# Patient Record
Sex: MALE | Race: WHITE | NOT HISPANIC OR LATINO | Employment: OTHER | URBAN - METROPOLITAN AREA
[De-identification: names, ages, dates, MRNs, and addresses within clinical notes are randomized per-mention and may not be internally consistent; named-entity substitution may affect disease eponyms.]

---

## 2020-02-06 ENCOUNTER — HOSPITAL ENCOUNTER (EMERGENCY)
Facility: HOSPITAL | Age: 65
Discharge: ANOTHER HOSPITAL | End: 2020-02-06
Attending: EMERGENCY MEDICINE | Admitting: EMERGENCY MEDICINE

## 2020-02-06 ENCOUNTER — HOSPITAL ENCOUNTER (INPATIENT)
Facility: HOSPITAL | Age: 65
LOS: 2 days | Discharge: HOME | End: 2020-02-08
Attending: INTERNAL MEDICINE | Admitting: INTERNAL MEDICINE

## 2020-02-06 VITALS
RESPIRATION RATE: 16 BRPM | HEART RATE: 59 BPM | HEIGHT: 71 IN | WEIGHT: 180 LBS | OXYGEN SATURATION: 100 % | SYSTOLIC BLOOD PRESSURE: 153 MMHG | TEMPERATURE: 98 F | DIASTOLIC BLOOD PRESSURE: 103 MMHG | BODY MASS INDEX: 25.2 KG/M2

## 2020-02-06 DIAGNOSIS — I21.09 STEMI INVOLVING OTH CORONARY ARTERY OF ANTERIOR WALL (HCC): Primary | ICD-10-CM

## 2020-02-06 DIAGNOSIS — I21.3 ST ELEVATION MYOCARDIAL INFARCTION (STEMI), UNSPECIFIED ARTERY (HCC): Primary | ICD-10-CM

## 2020-02-06 LAB
ALBUMIN SERPL-MCNC: 4.4 G/DL (ref 3.5–5.2)
ALBUMIN/GLOB SERPL: 1.3 G/DL
ALP SERPL-CCNC: 91 U/L (ref 39–117)
ALT SERPL W P-5'-P-CCNC: 15 U/L (ref 1–41)
ANION GAP SERPL CALCULATED.3IONS-SCNC: 12.8 MMOL/L (ref 5–15)
AST SERPL-CCNC: 20 U/L (ref 1–40)
BILIRUB SERPL-MCNC: 0.3 MG/DL (ref 0.2–1.2)
BUN BLD-MCNC: 16 MG/DL (ref 8–23)
BUN/CREAT SERPL: 14 (ref 7–25)
CALCIUM SPEC-SCNC: 9.8 MG/DL (ref 8.6–10.5)
CHLORIDE SERPL-SCNC: 104 MMOL/L (ref 98–107)
CO2 SERPL-SCNC: 24.2 MMOL/L (ref 22–29)
CREAT BLD-MCNC: 1.14 MG/DL (ref 0.76–1.27)
GFR SERPL CREATININE-BSD FRML MDRD: 65 ML/MIN/1.73
GLOBULIN UR ELPH-MCNC: 3.4 GM/DL
GLUCOSE BLD-MCNC: 120 MG/DL (ref 65–99)
GLUCOSE BLDC GLUCOMTR-MCNC: 102 MG/DL (ref 70–130)
GLUCOSE BLDC GLUCOMTR-MCNC: 85 MG/DL (ref 70–130)
HOLD SPECIMEN: NORMAL
POTASSIUM BLD-SCNC: 4.3 MMOL/L (ref 3.5–5.2)
PROT SERPL-MCNC: 7.8 G/DL (ref 6–8.5)
SODIUM BLD-SCNC: 141 MMOL/L (ref 136–145)
TROPONIN T SERPL-MCNC: 0.02 NG/ML (ref 0–0.03)
TROPONIN T SERPL-MCNC: 0.13 NG/ML (ref 0–0.03)

## 2020-02-06 PROCEDURE — 93458 L HRT ARTERY/VENTRICLE ANGIO: CPT | Performed by: INTERNAL MEDICINE

## 2020-02-06 PROCEDURE — C1887 CATHETER, GUIDING: HCPCS | Performed by: INTERNAL MEDICINE

## 2020-02-06 PROCEDURE — 80299 QUANTITATIVE ASSAY DRUG: CPT | Performed by: INTERNAL MEDICINE

## 2020-02-06 PROCEDURE — 92978 ENDOLUMINL IVUS OCT C 1ST: CPT | Performed by: INTERNAL MEDICINE

## 2020-02-06 PROCEDURE — 96375 TX/PRO/DX INJ NEW DRUG ADDON: CPT

## 2020-02-06 PROCEDURE — 99285 EMERGENCY DEPT VISIT HI MDM: CPT | Performed by: EMERGENCY MEDICINE

## 2020-02-06 PROCEDURE — B2151ZZ FLUOROSCOPY OF LEFT HEART USING LOW OSMOLAR CONTRAST: ICD-10-PCS | Performed by: INTERNAL MEDICINE

## 2020-02-06 PROCEDURE — G0480 DRUG TEST DEF 1-7 CLASSES: HCPCS | Performed by: INTERNAL MEDICINE

## 2020-02-06 PROCEDURE — 85347 COAGULATION TIME ACTIVATED: CPT

## 2020-02-06 PROCEDURE — 25010000002 FENTANYL CITRATE (PF) 100 MCG/2ML SOLUTION: Performed by: INTERNAL MEDICINE

## 2020-02-06 PROCEDURE — 82962 GLUCOSE BLOOD TEST: CPT

## 2020-02-06 PROCEDURE — 25010000002 PROTAMINE SULFATE PER 10 MG: Performed by: INTERNAL MEDICINE

## 2020-02-06 PROCEDURE — 84484 ASSAY OF TROPONIN QUANT: CPT | Performed by: INTERNAL MEDICINE

## 2020-02-06 PROCEDURE — 80184 ASSAY OF PHENOBARBITAL: CPT | Performed by: INTERNAL MEDICINE

## 2020-02-06 PROCEDURE — 99284 EMERGENCY DEPT VISIT MOD MDM: CPT

## 2020-02-06 PROCEDURE — 25010000002 HEPARIN (PORCINE) PER 1000 UNITS: Performed by: INTERNAL MEDICINE

## 2020-02-06 PROCEDURE — 25010000002 MIDAZOLAM PER 1 MG: Performed by: INTERNAL MEDICINE

## 2020-02-06 PROCEDURE — 99223 1ST HOSP IP/OBS HIGH 75: CPT | Performed by: INTERNAL MEDICINE

## 2020-02-06 PROCEDURE — 93010 ELECTROCARDIOGRAM REPORT: CPT | Performed by: INTERNAL MEDICINE

## 2020-02-06 PROCEDURE — 93005 ELECTROCARDIOGRAM TRACING: CPT | Performed by: INTERNAL MEDICINE

## 2020-02-06 PROCEDURE — 96365 THER/PROPH/DIAG IV INF INIT: CPT

## 2020-02-06 PROCEDURE — B2111ZZ FLUOROSCOPY OF MULTIPLE CORONARY ARTERIES USING LOW OSMOLAR CONTRAST: ICD-10-PCS | Performed by: INTERNAL MEDICINE

## 2020-02-06 PROCEDURE — C1753 CATH, INTRAVAS ULTRASOUND: HCPCS | Performed by: INTERNAL MEDICINE

## 2020-02-06 PROCEDURE — C1769 GUIDE WIRE: HCPCS | Performed by: INTERNAL MEDICINE

## 2020-02-06 PROCEDURE — C1894 INTRO/SHEATH, NON-LASER: HCPCS | Performed by: INTERNAL MEDICINE

## 2020-02-06 PROCEDURE — 4A023N7 MEASUREMENT OF CARDIAC SAMPLING AND PRESSURE, LEFT HEART, PERCUTANEOUS APPROACH: ICD-10-PCS | Performed by: INTERNAL MEDICINE

## 2020-02-06 PROCEDURE — 0 IOPAMIDOL PER 1 ML: Performed by: INTERNAL MEDICINE

## 2020-02-06 PROCEDURE — 93005 ELECTROCARDIOGRAM TRACING: CPT | Performed by: EMERGENCY MEDICINE

## 2020-02-06 PROCEDURE — 84484 ASSAY OF TROPONIN QUANT: CPT | Performed by: EMERGENCY MEDICINE

## 2020-02-06 PROCEDURE — 80053 COMPREHEN METABOLIC PANEL: CPT | Performed by: EMERGENCY MEDICINE

## 2020-02-06 RX ORDER — SODIUM CHLORIDE 9 MG/ML
INJECTION, SOLUTION INTRAVENOUS CONTINUOUS PRN
Status: COMPLETED | OUTPATIENT
Start: 2020-02-06 | End: 2020-02-06

## 2020-02-06 RX ORDER — SODIUM CHLORIDE 9 MG/ML
125 INJECTION, SOLUTION INTRAVENOUS CONTINUOUS
Status: ACTIVE | OUTPATIENT
Start: 2020-02-06 | End: 2020-02-06

## 2020-02-06 RX ORDER — NITROGLYCERIN 20 MG/100ML
5-200 INJECTION INTRAVENOUS
Status: DISCONTINUED | OUTPATIENT
Start: 2020-02-06 | End: 2020-02-06 | Stop reason: HOSPADM

## 2020-02-06 RX ORDER — NICOTINE 21 MG/24HR
1 PATCH, TRANSDERMAL 24 HOURS TRANSDERMAL
Status: DISCONTINUED | OUTPATIENT
Start: 2020-02-06 | End: 2020-02-08 | Stop reason: HOSPADM

## 2020-02-06 RX ORDER — MIDAZOLAM HYDROCHLORIDE 1 MG/ML
INJECTION INTRAMUSCULAR; INTRAVENOUS AS NEEDED
Status: DISCONTINUED | OUTPATIENT
Start: 2020-02-06 | End: 2020-02-06 | Stop reason: HOSPADM

## 2020-02-06 RX ORDER — ISOSORBIDE MONONITRATE 30 MG/1
30 TABLET, EXTENDED RELEASE ORAL
Status: DISCONTINUED | OUTPATIENT
Start: 2020-02-06 | End: 2020-02-08 | Stop reason: HOSPADM

## 2020-02-06 RX ORDER — HEPARIN SODIUM 10000 [USP'U]/100ML
INJECTION, SOLUTION INTRAVENOUS
Status: DISCONTINUED
Start: 2020-02-06 | End: 2020-02-06 | Stop reason: HOSPADM

## 2020-02-06 RX ORDER — CLOPIDOGREL BISULFATE 75 MG/1
TABLET ORAL
Status: COMPLETED
Start: 2020-02-06 | End: 2020-02-06

## 2020-02-06 RX ORDER — CLOPIDOGREL BISULFATE 75 MG/1
600 TABLET ORAL ONCE
Status: COMPLETED | OUTPATIENT
Start: 2020-02-06 | End: 2020-02-06

## 2020-02-06 RX ORDER — SODIUM CHLORIDE 0.9 % (FLUSH) 0.9 %
10 SYRINGE (ML) INJECTION AS NEEDED
Status: DISCONTINUED | OUTPATIENT
Start: 2020-02-06 | End: 2020-02-06 | Stop reason: HOSPADM

## 2020-02-06 RX ORDER — ASPIRIN 325 MG
325 TABLET ORAL EVERY 6 HOURS PRN
COMMUNITY
End: 2020-02-08 | Stop reason: HOSPADM

## 2020-02-06 RX ORDER — NITROGLYCERIN 20 MG/100ML
INJECTION INTRAVENOUS
Status: COMPLETED
Start: 2020-02-06 | End: 2020-02-06

## 2020-02-06 RX ORDER — NITROGLYCERIN 0.4 MG/1
0.4 TABLET SUBLINGUAL
Status: DISCONTINUED | OUTPATIENT
Start: 2020-02-06 | End: 2020-02-06 | Stop reason: HOSPADM

## 2020-02-06 RX ORDER — LIDOCAINE HYDROCHLORIDE 20 MG/ML
INJECTION, SOLUTION INFILTRATION; PERINEURAL AS NEEDED
Status: DISCONTINUED | OUTPATIENT
Start: 2020-02-06 | End: 2020-02-06 | Stop reason: HOSPADM

## 2020-02-06 RX ORDER — METOPROLOL TARTRATE 5 MG/5ML
INJECTION INTRAVENOUS
Status: DISCONTINUED
Start: 2020-02-06 | End: 2020-02-06 | Stop reason: HOSPADM

## 2020-02-06 RX ORDER — HEPARIN SODIUM 1000 [USP'U]/ML
INJECTION, SOLUTION INTRAVENOUS; SUBCUTANEOUS AS NEEDED
Status: DISCONTINUED | OUTPATIENT
Start: 2020-02-06 | End: 2020-02-06 | Stop reason: HOSPADM

## 2020-02-06 RX ORDER — ASPIRIN 325 MG
325 TABLET ORAL ONCE
Status: COMPLETED | OUTPATIENT
Start: 2020-02-06 | End: 2020-02-06

## 2020-02-06 RX ORDER — METOPROLOL TARTRATE 50 MG/1
TABLET, FILM COATED ORAL
Status: DISCONTINUED
Start: 2020-02-06 | End: 2020-02-06 | Stop reason: WASHOUT

## 2020-02-06 RX ORDER — PROTAMINE SULFATE 10 MG/ML
INJECTION, SOLUTION INTRAVENOUS AS NEEDED
Status: DISCONTINUED | OUTPATIENT
Start: 2020-02-06 | End: 2020-02-06 | Stop reason: HOSPADM

## 2020-02-06 RX ORDER — NAPROXEN SODIUM 220 MG
220 TABLET ORAL EVERY 12 HOURS PRN
COMMUNITY
End: 2020-02-08 | Stop reason: HOSPADM

## 2020-02-06 RX ORDER — FENTANYL CITRATE 50 UG/ML
INJECTION, SOLUTION INTRAMUSCULAR; INTRAVENOUS AS NEEDED
Status: DISCONTINUED | OUTPATIENT
Start: 2020-02-06 | End: 2020-02-06 | Stop reason: HOSPADM

## 2020-02-06 RX ORDER — HEPARIN SODIUM 5000 [USP'U]/ML
INJECTION, SOLUTION INTRAVENOUS; SUBCUTANEOUS
Status: DISCONTINUED
Start: 2020-02-06 | End: 2020-02-06 | Stop reason: WASHOUT

## 2020-02-06 RX ORDER — ASPIRIN 81 MG/1
TABLET, CHEWABLE ORAL
Status: DISCONTINUED
Start: 2020-02-06 | End: 2020-02-06 | Stop reason: HOSPADM

## 2020-02-06 RX ADMIN — CLOPIDOGREL BISULFATE 600 MG: 75 TABLET ORAL at 11:44

## 2020-02-06 RX ADMIN — SODIUM CHLORIDE 125 ML/HR: 9 INJECTION, SOLUTION INTRAVENOUS at 17:04

## 2020-02-06 RX ADMIN — NITROGLYCERIN: 20 INJECTION INTRAVENOUS at 11:44

## 2020-02-06 RX ADMIN — DILTIAZEM HYDROCHLORIDE 30 MG: 30 TABLET, FILM COATED ORAL at 21:13

## 2020-02-06 RX ADMIN — CLOPIDOGREL 600 MG: 75 TABLET, FILM COATED ORAL at 11:44

## 2020-02-06 RX ADMIN — METOPROLOL TARTRATE 5 MG: 1 INJECTION, SOLUTION INTRAVENOUS at 11:46

## 2020-02-06 RX ADMIN — ISOSORBIDE MONONITRATE 30 MG: 30 TABLET ORAL at 15:26

## 2020-02-06 RX ADMIN — DILTIAZEM HYDROCHLORIDE 30 MG: 30 TABLET, FILM COATED ORAL at 15:26

## 2020-02-06 RX ADMIN — NICOTINE 1 PATCH: 21 PATCH, EXTENDED RELEASE TRANSDERMAL at 17:18

## 2020-02-06 RX ADMIN — ASPIRIN 325 MG: 325 TABLET, COATED ORAL at 11:41

## 2020-02-06 NOTE — ED NOTES
Called EMS to transport patient to [facility].     Meri Springer  02/06/20 1142

## 2020-02-06 NOTE — ED PROVIDER NOTES
Subjective   64-year-old male who initially denied all past medical history presents complaining of bilateral arm pain and chest tightness.  Patient has had intermittent episodes of this since yesterday but this morning symptoms became worse and have persisted so patient presented here.  He denies any shortness of breath.  He does endorse some nausea with episodes of pain.  No identified aggravating or relieving factors.  On further questioning, patient reports that he did have cardiac work-up 4 to 5 years ago and was told that he had blockages, but did not follow-up.  Patient states he has not been on any medications for several years.  Patient is current everyday smoker smoking 2 packs/day.          Review of Systems   Constitutional: Negative.    HENT: Negative.    Eyes: Negative.    Respiratory: Negative.    Cardiovascular:        Per HPI, otherwise negative   Gastrointestinal:        Per HPI, otherwise negative   Endocrine: Negative.    Genitourinary: Negative.    Musculoskeletal:        Bilateral arm pain   Skin: Negative.    Allergic/Immunologic: Negative.    Neurological: Negative.    Hematological: Negative.    Psychiatric/Behavioral: Negative.        History reviewed. No pertinent past medical history.    No Known Allergies    History reviewed. No pertinent surgical history.    History reviewed. No pertinent family history.    Social History     Socioeconomic History   • Marital status: Significant Other     Spouse name: Not on file   • Number of children: Not on file   • Years of education: Not on file   • Highest education level: Not on file   Tobacco Use   • Smoking status: Current Every Day Smoker     Packs/day: 2.00     Types: Cigarettes   Substance and Sexual Activity   • Alcohol use: Yes   • Drug use: Not Currently           Objective   Physical Exam   Constitutional: He is oriented to person, place, and time. He appears well-developed and well-nourished. No distress.   HENT:   Head: Normocephalic  and atraumatic.   Mouth/Throat: Oropharynx is clear and moist.   Eyes: Pupils are equal, round, and reactive to light. Conjunctivae and EOM are normal.   Neck: Normal range of motion. Neck supple.   Cardiovascular: Normal rate, regular rhythm, normal heart sounds and intact distal pulses.   Pulmonary/Chest: Effort normal and breath sounds normal. No respiratory distress.   Abdominal: Soft. He exhibits no distension. There is no tenderness.   Musculoskeletal: Normal range of motion. He exhibits no edema, tenderness or deformity.   Neurological: He is alert and oriented to person, place, and time. No cranial nerve deficit.   Skin: Skin is warm and dry. He is not diaphoretic.   Psychiatric: He has a normal mood and affect. His behavior is normal. Judgment and thought content normal.       ECG 12 Lead    Date/Time: 2/6/2020 11:29 AM  Performed by: Artie Kelley MD  Authorized by: Artie Kelley MD   Interpreted by physician  Previous ECG: no previous ECG available  Rhythm: sinus rhythm  Rate: normal  BPM: 66  Conduction: conduction normal  ST Elevation: V1, V2 and aVL  ST Depression: II, III and aVF  T flattening: all  Clinical impression: myocardial infarction  Comments: STEMI                 ED Course  ED Course as of Feb 06 1210   Thu Feb 06, 2020   1133 Triage EKG and initial history concerning for STEMI.  Will initiate STEMI activation.    [TD]   1158 Patient given 325 aspirin, started on nitro drip, given 600 mg Plavix, 5 mg Lopressor.  After intervention, patient's pain decreased from an 8 to a 1.  Patient has been accepted by Dr. Chao to proceed to Cath Lab at [facility].    [TD]      ED Course User Index  [TD] Artie Kelley MD                                               [facility]    Final diagnoses:   ST elevation myocardial infarction (STEMI), unspecified artery (CMS/HCC)            Artie Kelley MD  02/06/20 1211

## 2020-02-06 NOTE — H&P
Date of Hospital Visit: 20  Encounter Provider: Darwin Chao MD  Place of Service: [facility] CARDIOLOGY  Patient Name: Sammy Roblero  :1955  [id number]    Chief complaint: Chest discomfort    History of Present Illness: 64-year-old gentleman looks about 15 years older than his stated age.  Longstanding tobacco abuse does not go to the physician otherwise.  Came in with stuttering chest pain off and on since yesterday.  In [facility] EKG shows anterior ST elevation in V1 and 2 in 1 and aVL and sent here to the Cath Lab at [facility].  He is having a little bit of discomfort right now.  No history of PND orthopnea edema no bleeding no history of stroke.  He says he has had a history of blockage may be in his left carotid it sounds like he denies significant history of lung or kidney problems    No past medical history on file.    No past surgical history on file.    No medications prior to admission.       Current Meds  Current Facility-Administered Medications on File Prior to Encounter   Medication Dose Route Frequency Provider Last Rate Last Dose   • [COMPLETED] aspirin tablet 325 mg  325 mg Oral Once Artie Kelley MD   325 mg at 20 1141   • [COMPLETED] clopidogrel (PLAVIX) tablet 600 mg  600 mg Oral Once Artie Kelley MD   600 mg at 20 1144   • [COMPLETED] metoprolol tartrate (LOPRESSOR) injection 5 mg  5 mg Intravenous Once Artie Kelley MD   5 mg at 20 1146   • [DISCONTINUED] aspirin 81 MG chewable tablet  - ADS Override Pull            • [DISCONTINUED] Heparin (Porcine) 34337-3.45 UT/250ML-% infusion  - ADS Override Pull            • [DISCONTINUED] heparin (porcine) 5000 UNIT/ML injection  - ADS Override Pull            • [DISCONTINUED] metoprolol tartrate (LOPRESSOR) 50 MG tablet  - ADS Override Pull            • [DISCONTINUED] morphine 4 MG/ML injection  - ADS Override Pull            • [DISCONTINUED] nitroglycerin  (NITROSTAT) SL tablet 0.4 mg  0.4 mg Sublingual Q5 Min PRN Artie Kelley MD       • [DISCONTINUED] nitroglycerin 50 mg/250 mL (0.2 mg/mL) infusion  5-200 mcg/min Intravenous Titrated Artie Kelley MD   Stopped at 02/06/20 1202   • [DISCONTINUED] sodium chloride 0.9 % flush 10 mL  10 mL Intravenous PRN Artie Kelley MD         No current outpatient medications on file prior to encounter.       Social History     Socioeconomic History   • Marital status: Significant Other     Spouse name: Not on file   • Number of children: Not on file   • Years of education: Not on file   • Highest education level: Not on file   Tobacco Use   • Smoking status: Current Every Day Smoker     Packs/day: 2.00     Types: Cigarettes   Substance and Sexual Activity   • Alcohol use: Yes   • Drug use: Not Currently       Family Hx: Non-contributory    REVIEW OF SYSTEMS:   ROS was performed and is negative except as outlined in HPI     REVIEW OF SYSTEMS:   CONSTITUTIONAL: No weight loss, fever, chills, weakness or fatigue.   HEENT: Eyes: No visual loss, blurred vision, double vision or yellow sclerae. Ears, Nose, Throat: No hearing loss, sneezing, congestion, runny nose or sore throat.   SKIN: No rash or itching.     RESPIRATORY: No shortness of breath, hemoptysis, cough or sputum.   GASTROINTESTINAL: No anorexia, nausea, vomiting or diarrhea. No abdominal pain, bright red blood per rectum or melena.  NEUROLOGICAL: No headache, dizziness, syncope, paralysis, numbness or tingling in the extremities.  MUSCULOSKELETAL: No muscle, back pain, joint pain or stiffness.   HEMATOLOGIC: No anemia, bleeding or bruising.   LYMPHATICS: No enlarged nodes.  PSYCHIATRIC: No history of depression, anxiety, hallucinations.   ENDOCRINOLOGIC: No reports of sweating, cold or heat intolerance. No polyuria or polydipsia.        Objective:     Vitals:    02/06/20 1305 02/06/20 1309 02/06/20 1314 02/06/20 1319   Resp: 16 18 20 20     There is no height  or weight on file to calculate BMI.      General Appearance:    Alert, oriented x 3, in no acute distress, older than his stated age   Head:    Normocephalic, without obvious abnormality, atraumatic   Ears:    Ears appear intact with no abnormalities noted   Throat:   No oral lesions, dentition good   Neck:   No adenopathy, supple, trachea midline, no thyromegaly, no carotid bruit, no JVD   Lungs:    Breath sounds are equal and  clear to auscultation    Heart:   Normal S1 and S2, RRR, no murmur/gallop or rub   Abdomen:    Normal bowel sounds, obese, soft non-tender, non-distended, no organomegaly, no guarding   Extremities:   Moves all extremities well, no edema, no cyanosis, no redness   Pulses:   Pulses palpable and equal bilaterally. Normal radial pulses   Skin:   No bleeding, bruising or rash   Lymph nodes:   No palpable adenopathy     I personally viewed and interpreted the patient's EKG/Telemetry data    Assessment:  Active Hospital Problems    Diagnosis  POA   • STEMI involving oth coronary artery of anterior wall (CMS/HCC) [I21.09]  Yes      Resolved Hospital Problems   No resolved problems to display.       Plan: It looks like he is having an anterior STEMI working to take him directly to the Cath Lab further decisions will be based on the findings at the time of cath.  This is obviously a life-threatening situation

## 2020-02-06 NOTE — ED NOTES
Called [facility]. Call is paged out to interventionist.      Meri Springer  02/06/20 1137

## 2020-02-06 NOTE — PLAN OF CARE
Pt admitted from cath lab. No complaints of pain. Tolerating diet. TR band removed. Site soft, no hematoma. CTM.

## 2020-02-07 ENCOUNTER — APPOINTMENT (OUTPATIENT)
Dept: CARDIOLOGY | Facility: HOSPITAL | Age: 65
End: 2020-02-07

## 2020-02-07 LAB
ACT BLD: 114 SECONDS (ref 82–152)
ACT BLD: 246 SECONDS (ref 82–152)
ANION GAP SERPL CALCULATED.3IONS-SCNC: 12.1 MMOL/L (ref 5–15)
BH CV ECHO MEAS - ACS: 1.8 CM
BH CV ECHO MEAS - AO MAX PG (FULL): 0.58 MMHG
BH CV ECHO MEAS - AO MAX PG: 8.6 MMHG
BH CV ECHO MEAS - AO MEAN PG (FULL): 0 MMHG
BH CV ECHO MEAS - AO MEAN PG: 4 MMHG
BH CV ECHO MEAS - AO ROOT AREA (BSA CORRECTED): 1.7
BH CV ECHO MEAS - AO ROOT AREA: 9.1 CM^2
BH CV ECHO MEAS - AO ROOT DIAM: 3.4 CM
BH CV ECHO MEAS - AO V2 MAX: 147 CM/SEC
BH CV ECHO MEAS - AO V2 MEAN: 96.4 CM/SEC
BH CV ECHO MEAS - AO V2 VTI: 25.9 CM
BH CV ECHO MEAS - ASC AORTA: 3.1 CM
BH CV ECHO MEAS - AVA(I,A): 3.6 CM^2
BH CV ECHO MEAS - AVA(I,D): 3.6 CM^2
BH CV ECHO MEAS - AVA(V,A): 3 CM^2
BH CV ECHO MEAS - AVA(V,D): 3 CM^2
BH CV ECHO MEAS - BSA(HAYCOCK): 2 M^2
BH CV ECHO MEAS - BSA: 2 M^2
BH CV ECHO MEAS - BZI_BMI: 23.8 KILOGRAMS/M^2
BH CV ECHO MEAS - BZI_METRIC_HEIGHT: 180.3 CM
BH CV ECHO MEAS - BZI_METRIC_WEIGHT: 77.6 KG
BH CV ECHO MEAS - EDV(CUBED): 74.1 ML
BH CV ECHO MEAS - EDV(MOD-SP2): 87 ML
BH CV ECHO MEAS - EDV(MOD-SP4): 87 ML
BH CV ECHO MEAS - EDV(TEICH): 78.6 ML
BH CV ECHO MEAS - EF(CUBED): 73.4 %
BH CV ECHO MEAS - EF(MOD-BP): 75 %
BH CV ECHO MEAS - EF(MOD-SP2): 75.9 %
BH CV ECHO MEAS - EF(MOD-SP4): 72.4 %
BH CV ECHO MEAS - EF(TEICH): 65.6 %
BH CV ECHO MEAS - ESV(CUBED): 19.7 ML
BH CV ECHO MEAS - ESV(MOD-SP2): 21 ML
BH CV ECHO MEAS - ESV(MOD-SP4): 24 ML
BH CV ECHO MEAS - ESV(TEICH): 27 ML
BH CV ECHO MEAS - FS: 35.7 %
BH CV ECHO MEAS - IVS/LVPW: 1.1
BH CV ECHO MEAS - IVSD: 1.2 CM
BH CV ECHO MEAS - LAT PEAK E' VEL: 10.6 CM/SEC
BH CV ECHO MEAS - LV DIASTOLIC VOL/BSA (35-75): 44.1 ML/M^2
BH CV ECHO MEAS - LV MASS(C)D: 167.4 GRAMS
BH CV ECHO MEAS - LV MASS(C)DI: 84.9 GRAMS/M^2
BH CV ECHO MEAS - LV MAX PG: 8.1 MMHG
BH CV ECHO MEAS - LV MEAN PG: 4 MMHG
BH CV ECHO MEAS - LV SYSTOLIC VOL/BSA (12-30): 12.2 ML/M^2
BH CV ECHO MEAS - LV V1 MAX: 142 CM/SEC
BH CV ECHO MEAS - LV V1 MEAN: 88.4 CM/SEC
BH CV ECHO MEAS - LV V1 VTI: 29.8 CM
BH CV ECHO MEAS - LVIDD: 4.2 CM
BH CV ECHO MEAS - LVIDS: 2.7 CM
BH CV ECHO MEAS - LVLD AP2: 8 CM
BH CV ECHO MEAS - LVLD AP4: 7.5 CM
BH CV ECHO MEAS - LVLS AP2: 5.7 CM
BH CV ECHO MEAS - LVLS AP4: 5.6 CM
BH CV ECHO MEAS - LVOT AREA (M): 3.1 CM^2
BH CV ECHO MEAS - LVOT AREA: 3.1 CM^2
BH CV ECHO MEAS - LVOT DIAM: 2 CM
BH CV ECHO MEAS - LVPWD: 1.1 CM
BH CV ECHO MEAS - MED PEAK E' VEL: 9.6 CM/SEC
BH CV ECHO MEAS - MV A DUR: 0.17 SEC
BH CV ECHO MEAS - MV A MAX VEL: 103 CM/SEC
BH CV ECHO MEAS - MV DEC SLOPE: 496 CM/SEC^2
BH CV ECHO MEAS - MV DEC TIME: 0.22 SEC
BH CV ECHO MEAS - MV E MAX VEL: 75.7 CM/SEC
BH CV ECHO MEAS - MV E/A: 0.73
BH CV ECHO MEAS - MV MAX PG: 3 MMHG
BH CV ECHO MEAS - MV MEAN PG: 1 MMHG
BH CV ECHO MEAS - MV P1/2T MAX VEL: 90 CM/SEC
BH CV ECHO MEAS - MV P1/2T: 53.1 MSEC
BH CV ECHO MEAS - MV V2 MAX: 87 CM/SEC
BH CV ECHO MEAS - MV V2 MEAN: 44.3 CM/SEC
BH CV ECHO MEAS - MV V2 VTI: 27.8 CM
BH CV ECHO MEAS - MVA P1/2T LCG: 2.4 CM^2
BH CV ECHO MEAS - MVA(P1/2T): 4.1 CM^2
BH CV ECHO MEAS - MVA(VTI): 3.4 CM^2
BH CV ECHO MEAS - PA ACC TIME: 0.12 SEC
BH CV ECHO MEAS - PA MAX PG (FULL): 2.1 MMHG
BH CV ECHO MEAS - PA MAX PG: 4.8 MMHG
BH CV ECHO MEAS - PA PR(ACCEL): 26.8 MMHG
BH CV ECHO MEAS - PA V2 MAX: 109 CM/SEC
BH CV ECHO MEAS - PULM A REVS DUR: 0.17 SEC
BH CV ECHO MEAS - PULM A REVS VEL: 37.8 CM/SEC
BH CV ECHO MEAS - PULM DIAS VEL: 57.7 CM/SEC
BH CV ECHO MEAS - PULM S/D: 1.2
BH CV ECHO MEAS - PULM SYS VEL: 68.9 CM/SEC
BH CV ECHO MEAS - PVA(V,A): 2.1 CM^2
BH CV ECHO MEAS - PVA(V,D): 2.1 CM^2
BH CV ECHO MEAS - QP/QS: 0.59
BH CV ECHO MEAS - RAP SYSTOLE: 8 MMHG
BH CV ECHO MEAS - RV MAX PG: 2.7 MMHG
BH CV ECHO MEAS - RV MEAN PG: 2 MMHG
BH CV ECHO MEAS - RV V1 MAX: 82 CM/SEC
BH CV ECHO MEAS - RV V1 MEAN: 62.7 CM/SEC
BH CV ECHO MEAS - RV V1 VTI: 19.4 CM
BH CV ECHO MEAS - RVOT AREA: 2.8 CM^2
BH CV ECHO MEAS - RVOT DIAM: 1.9 CM
BH CV ECHO MEAS - RVSP: 13 MMHG
BH CV ECHO MEAS - SI(AO): 119.2 ML/M^2
BH CV ECHO MEAS - SI(CUBED): 27.6 ML/M^2
BH CV ECHO MEAS - SI(LVOT): 47.4 ML/M^2
BH CV ECHO MEAS - SI(MOD-SP2): 33.5 ML/M^2
BH CV ECHO MEAS - SI(MOD-SP4): 31.9 ML/M^2
BH CV ECHO MEAS - SI(TEICH): 26.1 ML/M^2
BH CV ECHO MEAS - SV(AO): 235.2 ML
BH CV ECHO MEAS - SV(CUBED): 54.4 ML
BH CV ECHO MEAS - SV(LVOT): 93.6 ML
BH CV ECHO MEAS - SV(MOD-SP2): 66 ML
BH CV ECHO MEAS - SV(MOD-SP4): 63 ML
BH CV ECHO MEAS - SV(RVOT): 55 ML
BH CV ECHO MEAS - SV(TEICH): 51.6 ML
BH CV ECHO MEAS - TAPSE (>1.6): 2.6 CM2
BH CV ECHO MEAS - TR MAX VEL: 109 CM/SEC
BH CV ECHO MEASUREMENTS AVERAGE E/E' RATIO: 7.5
BH CV XLRA - RV BASE: 3.1 CM
BH CV XLRA - RV LENGTH: 6.1 CM
BH CV XLRA - RV MID: 2.1 CM
BH CV XLRA - TDI S': 19.8 CM/SEC
BUN BLD-MCNC: 17 MG/DL (ref 8–23)
BUN/CREAT SERPL: 15.5 (ref 7–25)
CALCIUM SPEC-SCNC: 8.3 MG/DL (ref 8.6–10.5)
CHLORIDE SERPL-SCNC: 109 MMOL/L (ref 98–107)
CHOLEST SERPL-MCNC: 164 MG/DL (ref 0–200)
CO2 SERPL-SCNC: 19.9 MMOL/L (ref 22–29)
CREAT BLD-MCNC: 1.1 MG/DL (ref 0.76–1.27)
DEPRECATED RDW RBC AUTO: 45.1 FL (ref 37–54)
ERYTHROCYTE [DISTWIDTH] IN BLOOD BY AUTOMATED COUNT: 13.3 % (ref 12.3–15.4)
GFR SERPL CREATININE-BSD FRML MDRD: 67 ML/MIN/1.73
GLUCOSE BLD-MCNC: 103 MG/DL (ref 65–99)
HBA1C MFR BLD: 5.9 % (ref 4.8–5.6)
HCT VFR BLD AUTO: 39 % (ref 37.5–51)
HDLC SERPL-MCNC: 32 MG/DL (ref 40–60)
HGB BLD-MCNC: 13.1 G/DL (ref 13–17.7)
LDLC SERPL CALC-MCNC: 80 MG/DL (ref 0–100)
LDLC/HDLC SERPL: 2.49 {RATIO}
LEFT ATRIUM VOLUME INDEX: 28 ML/M2
MAXIMAL PREDICTED HEART RATE: 156 BPM
MCH RBC QN AUTO: 31.3 PG (ref 26.6–33)
MCHC RBC AUTO-ENTMCNC: 33.6 G/DL (ref 31.5–35.7)
MCV RBC AUTO: 93.1 FL (ref 79–97)
PLATELET # BLD AUTO: 227 10*3/MM3 (ref 140–450)
PMV BLD AUTO: 10.2 FL (ref 6–12)
POTASSIUM BLD-SCNC: 4.6 MMOL/L (ref 3.5–5.2)
RBC # BLD AUTO: 4.19 10*6/MM3 (ref 4.14–5.8)
SODIUM BLD-SCNC: 141 MMOL/L (ref 136–145)
STRESS TARGET HR: 133 BPM
TRIGL SERPL-MCNC: 261 MG/DL (ref 0–150)
TROPONIN T SERPL-MCNC: 0.06 NG/ML (ref 0–0.03)
VLDLC SERPL-MCNC: 52.2 MG/DL (ref 5–40)
WBC NRBC COR # BLD: 7.35 10*3/MM3 (ref 3.4–10.8)

## 2020-02-07 PROCEDURE — 83036 HEMOGLOBIN GLYCOSYLATED A1C: CPT | Performed by: INTERNAL MEDICINE

## 2020-02-07 PROCEDURE — 99233 SBSQ HOSP IP/OBS HIGH 50: CPT | Performed by: INTERNAL MEDICINE

## 2020-02-07 PROCEDURE — 93306 TTE W/DOPPLER COMPLETE: CPT

## 2020-02-07 PROCEDURE — 93005 ELECTROCARDIOGRAM TRACING: CPT | Performed by: INTERNAL MEDICINE

## 2020-02-07 PROCEDURE — G0008 ADMIN INFLUENZA VIRUS VAC: HCPCS | Performed by: INTERNAL MEDICINE

## 2020-02-07 PROCEDURE — 85027 COMPLETE CBC AUTOMATED: CPT | Performed by: INTERNAL MEDICINE

## 2020-02-07 PROCEDURE — 93306 TTE W/DOPPLER COMPLETE: CPT | Performed by: INTERNAL MEDICINE

## 2020-02-07 PROCEDURE — 80061 LIPID PANEL: CPT | Performed by: INTERNAL MEDICINE

## 2020-02-07 PROCEDURE — 80048 BASIC METABOLIC PNL TOTAL CA: CPT | Performed by: INTERNAL MEDICINE

## 2020-02-07 PROCEDURE — 25010000002 INFLUENZA VAC SPLIT QUAD 0.5 ML SUSPENSION PREFILLED SYRINGE: Performed by: INTERNAL MEDICINE

## 2020-02-07 PROCEDURE — 90686 IIV4 VACC NO PRSV 0.5 ML IM: CPT | Performed by: INTERNAL MEDICINE

## 2020-02-07 PROCEDURE — 84484 ASSAY OF TROPONIN QUANT: CPT | Performed by: INTERNAL MEDICINE

## 2020-02-07 PROCEDURE — 93010 ELECTROCARDIOGRAM REPORT: CPT | Performed by: INTERNAL MEDICINE

## 2020-02-07 RX ORDER — ASPIRIN 81 MG/1
81 TABLET ORAL DAILY
Status: DISCONTINUED | OUTPATIENT
Start: 2020-02-07 | End: 2020-02-08 | Stop reason: HOSPADM

## 2020-02-07 RX ORDER — ATORVASTATIN CALCIUM 20 MG/1
40 TABLET, FILM COATED ORAL DAILY
Status: DISCONTINUED | OUTPATIENT
Start: 2020-02-07 | End: 2020-02-08 | Stop reason: HOSPADM

## 2020-02-07 RX ORDER — DILTIAZEM HYDROCHLORIDE 240 MG/1
240 CAPSULE, COATED, EXTENDED RELEASE ORAL
Status: DISCONTINUED | OUTPATIENT
Start: 2020-02-07 | End: 2020-02-08 | Stop reason: HOSPADM

## 2020-02-07 RX ORDER — CLOPIDOGREL BISULFATE 75 MG/1
75 TABLET ORAL DAILY
Status: DISCONTINUED | OUTPATIENT
Start: 2020-02-07 | End: 2020-02-08 | Stop reason: HOSPADM

## 2020-02-07 RX ADMIN — NICOTINE 1 PATCH: 21 PATCH, EXTENDED RELEASE TRANSDERMAL at 08:41

## 2020-02-07 RX ADMIN — ATORVASTATIN CALCIUM 40 MG: 20 TABLET, FILM COATED ORAL at 08:40

## 2020-02-07 RX ADMIN — CLOPIDOGREL 75 MG: 75 TABLET, FILM COATED ORAL at 08:40

## 2020-02-07 RX ADMIN — DILTIAZEM HYDROCHLORIDE 240 MG: 240 CAPSULE, COATED, EXTENDED RELEASE ORAL at 08:40

## 2020-02-07 RX ADMIN — ISOSORBIDE MONONITRATE 30 MG: 30 TABLET ORAL at 08:40

## 2020-02-07 RX ADMIN — INFLUENZA A VIRUS A/BRISBANE/02/2018 IVR-190 (H1N1) ANTIGEN (PROPIOLACTONE INACTIVATED), INFLUENZA A VIRUS A/KANSAS/14/2017 X-327 (H3N2) ANTIGEN (PROPIOLACTONE INACTIVATED), INFLUENZA B VIRUS B/MARYLAND/15/2016 ANTIGEN (PROPIOLACTONE INACTIVATED), INFLUENZA B VIRUS B/PHUKET/3073/2013 BVR-1B ANTIGEN (PROPIOLACTONE INACTIVATED) 0.5 ML: 15; 15; 15; 15 INJECTION, SUSPENSION INTRAMUSCULAR at 12:40

## 2020-02-07 RX ADMIN — ASPIRIN 81 MG: 81 TABLET, COATED ORAL at 08:39

## 2020-02-07 NOTE — PLAN OF CARE
VSS. Pt remains a tele overflow in CCU. No complaints of pain. Walked around the unit multiple times. Tolerating diet. Probably home in a day or two. CTM.

## 2020-02-07 NOTE — PROGRESS NOTES
Clinical Pharmacy Services: Medication History    Sammy Roblero is a 64 y.o. male presenting to [facility] for STEMI involving oth coronary artery of anterior wall (CMS/HCC) [I21.09]    He  has no past medical history on file.    Allergies as of 02/06/2020   • (No Known Allergies)       Medication information was obtained from: patient  Pharmacy and Phone Number: [facility]    Prior to Admission Medications     Prescriptions Last Dose Informant Patient Reported? Taking?    aspirin 325 MG tablet   Yes Yes    Take 325 mg by mouth Every 6 (Six) Hours As Needed for Mild Pain .    naproxen sodium (ALEVE) 220 MG tablet  Self Yes Yes    Take 220 mg by mouth Every 12 (Twelve) Hours As Needed.          Medication notes: patient reports he takes no prescription medications and does not see any physicians.  Takes aspirin or naproxen only as needed.    This medication list is complete to the best of my knowledge as of 2/7/2020    Please call if questions.    Caryl Quiros, Jordyn, BCPS  2/7/2020 2:44 PM

## 2020-02-07 NOTE — PLAN OF CARE
"  Problem: Patient Care Overview  Goal: Plan of Care Review  Outcome: Ongoing (interventions implemented as appropriate)  Note:   No significant changes overnight. Pt denies CP. States his \"arm heaviness\" has not changed. Cardizem given. VSS. Afebrile. SR w/ inverted T waves. Bradycardic in the 40s as low as 46. Will hold off on next dose of cardizem until seen by Cards. Dania Ibrahim RN      "

## 2020-02-07 NOTE — PROGRESS NOTES
"Sammy Roblero  1955 64 y.o.  [id number]      Patient Care Team:  Provider, No Known as PCP - General    CC: Heavy tobacco use, STEMI secondary to vasospasm    Interval History: He is doing well overnight      Objective   Vital Signs  Temp:  [97.4 °F (36.3 °C)-98 °F (36.7 °C)] 97.5 °F (36.4 °C)  Heart Rate:  [] 59  Resp:  [16-20] 18  BP: ()/() 153/81    Intake/Output Summary (Last 24 hours) at 2/7/2020 0856  Last data filed at 2/7/2020 0530  Gross per 24 hour   Intake 2366 ml   Output 2025 ml   Net 341 ml     Flowsheet Rows      First Filed Value   Admission Height  180.3 cm (70.98\") Documented at 02/06/2020 1415   Admission Weight  78 kg (171 lb 15.3 oz) Documented at 02/06/2020 1415          Physical Exam:   General Appearance:    Alert,oriented, in no acute distress   Lungs:     Clear to auscultation,BS are equal    Heart:    Normal S1 and S2, RRR without murmur, gallop or rub   HEENT:    Sclerae are clear, no JVD or adenopathy   Abdomen:     Normal bowel sounds, soft non-tender, non-distended, no HSM   Extremities:   Moves all extremities well, no edema, no cyanosis, no             Redness, no rash     Medication Review:        aspirin 81 mg Oral Daily   atorvastatin 40 mg Oral Daily   clopidogrel 75 mg Oral Daily   dilTIAZem  mg Oral Q24H   influenza vaccine 0.5 mL Intramuscular Once   isosorbide mononitrate 30 mg Oral Q24H   nicotine 1 patch Transdermal Q24H            I reviewed the patient's new clinical results.  I personally viewed and interpreted the patient's EKG/Telemetry data    Assessment/Plan  Active Hospital Problems    Diagnosis  POA   • STEMI involving oth coronary artery of anterior wall (CMS/HCC) [I21.09]  Yes      Resolved Hospital Problems   No resolved problems to display.       ECG shows impressive ST-T changes overnight consistent with evolution of his ischemic event he otherwise is doing well I strongly encouraged him to stop smoking I think the chance of that " happening is 0.  I am going to transfer him out of the CCU we have him on a calcium channel blocker and a long-acting nitrate as well as ADP receptor blocker and a statin.  Beta-blockers are actually contraindicated in this situation and his LV function is still preserved so I do not feel that he probably needs an ARB right now we will check an echo on him today.  He may be able to be discharged tomorrow    Darwin Chao MD  02/07/20  8:56 AM

## 2020-02-07 NOTE — PROGRESS NOTES
Discharge Planning Assessment  [facility]     Patient Name: Sammy Roblero  MRN: [id number]  Today's Date: 2/7/2020    Admit Date: 2/6/2020    Discharge Needs Assessment     Row Name 02/07/20 1342       Living Environment    Lives With  significant other    Current Living Arrangements  home/apartment/condo    Potentially Unsafe Housing Conditions  unable to assess    Primary Care Provided by  self;spouse/significant other    Provides Primary Care For  no one    Family Caregiver if Needed  significant other    Quality of Family Relationships  supportive    Able to Return to Prior Arrangements  yes       Resource/Environmental Concerns    Resource/Environmental Concerns  none    Transportation Concerns  car, none       Transition Planning    Patient/Family Anticipates Transition to  home    Patient/Family Anticipated Services at Transition  none    Transportation Anticipated  family or friend will provide       Discharge Needs Assessment    Concerns to be Addressed  discharge planning    Equipment Currently Used at Home  none    Anticipated Changes Related to Illness  none    Equipment Needed After Discharge  none        Discharge Plan     Row Name 02/07/20 1342       Plan    Plan  Home  denies needs    Plan Comments  IMM noted CCP spoke to patient at bedside to discuss discharge planning. Face sheet verified. CCP role explained.  Pt does not have a PCP.   Pt emergency contact is his girlfriend Kierra, [phone].      Pt lives in a Mobile Home with his girlfriend.  He does not use any DME at home.  .  Pt is independent with ADL's.  He has no past history with Home Health.  Pt has no past rehab stays.  Pt plan is home  Denies need  Declines referrals He obtains his medications at [facility] pharmacy in [facility].  CCP following           Destination      Coordination has not been started for this encounter.      Durable Medical Equipment      Coordination has not been started for this encounter.       Dialysis/Infusion      Coordination has not been started for this encounter.      Home Medical Care      Coordination has not been started for this encounter.      Therapy      Coordination has not been started for this encounter.      Community Resources      Coordination has not been started for this encounter.          Demographic Summary    No documentation.       Functional Status    No documentation.       Psychosocial    No documentation.       Abuse/Neglect    No documentation.       Legal    No documentation.       Substance Abuse    No documentation.       Patient Forms    No documentation.           Rolanda Brandon RN

## 2020-02-08 VITALS
BODY MASS INDEX: 23.94 KG/M2 | TEMPERATURE: 97.3 F | HEART RATE: 62 BPM | DIASTOLIC BLOOD PRESSURE: 58 MMHG | WEIGHT: 171 LBS | OXYGEN SATURATION: 96 % | HEIGHT: 71 IN | RESPIRATION RATE: 16 BRPM | SYSTOLIC BLOOD PRESSURE: 110 MMHG

## 2020-02-08 LAB
ANION GAP SERPL CALCULATED.3IONS-SCNC: 14.5 MMOL/L (ref 5–15)
BUN BLD-MCNC: 16 MG/DL (ref 8–23)
BUN/CREAT SERPL: 14.5 (ref 7–25)
CALCIUM SPEC-SCNC: 8.7 MG/DL (ref 8.6–10.5)
CHLORIDE SERPL-SCNC: 107 MMOL/L (ref 98–107)
CO2 SERPL-SCNC: 20.5 MMOL/L (ref 22–29)
CREAT BLD-MCNC: 1.1 MG/DL (ref 0.76–1.27)
DEPRECATED RDW RBC AUTO: 42.6 FL (ref 37–54)
ERYTHROCYTE [DISTWIDTH] IN BLOOD BY AUTOMATED COUNT: 13.1 % (ref 12.3–15.4)
GFR SERPL CREATININE-BSD FRML MDRD: 67 ML/MIN/1.73
GLUCOSE BLD-MCNC: 106 MG/DL (ref 65–99)
HCT VFR BLD AUTO: 36.8 % (ref 37.5–51)
HGB BLD-MCNC: 12.9 G/DL (ref 13–17.7)
MCH RBC QN AUTO: 31.9 PG (ref 26.6–33)
MCHC RBC AUTO-ENTMCNC: 35.1 G/DL (ref 31.5–35.7)
MCV RBC AUTO: 91.1 FL (ref 79–97)
PLATELET # BLD AUTO: 211 10*3/MM3 (ref 140–450)
PMV BLD AUTO: 10.1 FL (ref 6–12)
POTASSIUM BLD-SCNC: 4.2 MMOL/L (ref 3.5–5.2)
RBC # BLD AUTO: 4.04 10*6/MM3 (ref 4.14–5.8)
SODIUM BLD-SCNC: 142 MMOL/L (ref 136–145)
WBC NRBC COR # BLD: 6.22 10*3/MM3 (ref 3.4–10.8)

## 2020-02-08 PROCEDURE — 80048 BASIC METABOLIC PNL TOTAL CA: CPT | Performed by: INTERNAL MEDICINE

## 2020-02-08 PROCEDURE — 85027 COMPLETE CBC AUTOMATED: CPT | Performed by: INTERNAL MEDICINE

## 2020-02-08 PROCEDURE — 93010 ELECTROCARDIOGRAM REPORT: CPT | Performed by: INTERNAL MEDICINE

## 2020-02-08 PROCEDURE — 99238 HOSP IP/OBS DSCHRG MGMT 30/<: CPT | Performed by: NURSE PRACTITIONER

## 2020-02-08 PROCEDURE — 93005 ELECTROCARDIOGRAM TRACING: CPT | Performed by: INTERNAL MEDICINE

## 2020-02-08 RX ORDER — ISOSORBIDE MONONITRATE 30 MG/1
30 TABLET, EXTENDED RELEASE ORAL
Qty: 30 TABLET | Refills: 11 | Status: SHIPPED | OUTPATIENT
Start: 2020-02-09 | End: 2020-03-03 | Stop reason: HOSPADM

## 2020-02-08 RX ORDER — NICOTINE 21 MG/24HR
1 PATCH, TRANSDERMAL 24 HOURS TRANSDERMAL
Qty: 30 PATCH | Refills: 11 | Status: SHIPPED | OUTPATIENT
Start: 2020-02-09 | End: 2020-03-01

## 2020-02-08 RX ORDER — CLOPIDOGREL BISULFATE 75 MG/1
75 TABLET ORAL DAILY
Qty: 30 TABLET | Refills: 11 | Status: SHIPPED | OUTPATIENT
Start: 2020-02-09 | End: 2021-03-04

## 2020-02-08 RX ORDER — ATORVASTATIN CALCIUM 40 MG/1
40 TABLET, FILM COATED ORAL DAILY
Qty: 30 TABLET | Refills: 11 | Status: SHIPPED | OUTPATIENT
Start: 2020-02-09 | End: 2021-02-11

## 2020-02-08 RX ORDER — DILTIAZEM HYDROCHLORIDE 240 MG/1
240 CAPSULE, COATED, EXTENDED RELEASE ORAL
Qty: 30 CAPSULE | Refills: 11 | Status: SHIPPED | OUTPATIENT
Start: 2020-02-09 | End: 2020-03-03 | Stop reason: HOSPADM

## 2020-02-08 RX ORDER — ASPIRIN 81 MG/1
81 TABLET ORAL DAILY
Qty: 30 TABLET | Refills: 11 | Status: SHIPPED | OUTPATIENT
Start: 2020-02-09

## 2020-02-08 RX ADMIN — ISOSORBIDE MONONITRATE 30 MG: 30 TABLET ORAL at 08:43

## 2020-02-08 RX ADMIN — ATORVASTATIN CALCIUM 40 MG: 20 TABLET, FILM COATED ORAL at 08:43

## 2020-02-08 RX ADMIN — NICOTINE 1 PATCH: 21 PATCH, EXTENDED RELEASE TRANSDERMAL at 08:44

## 2020-02-08 RX ADMIN — DILTIAZEM HYDROCHLORIDE 240 MG: 240 CAPSULE, COATED, EXTENDED RELEASE ORAL at 08:43

## 2020-02-08 RX ADMIN — ASPIRIN 81 MG: 81 TABLET, COATED ORAL at 08:43

## 2020-02-08 RX ADMIN — CLOPIDOGREL 75 MG: 75 TABLET, FILM COATED ORAL at 08:43

## 2020-02-08 NOTE — DISCHARGE SUMMARY
Sammy Roblero  [id number]    Date of Admit: 2/6/2020  Date of Discharge:  2/8/2020    Discharge Diagnosis: STEMI secondary to vasospasm  Active Hospital Problems    Diagnosis  POA   • STEMI involving oth coronary artery of anterior wall (CMS/HCC) [I21.09]  Yes      Resolved Hospital Problems   No resolved problems to display.       Hospital Course:  Presented to the ED in Psychiatric on 2/6/2020 with stuttering chest pain off and on since the day prior.  In [facility] EKG shows anterior ST elevation in V1 and 2 in 1 and aVL and was then sent  to the Cath Lab at [facility].   No history of PND orthopnea edema no bleeding no history of stroke.  He says he has had a history of blockage, this may be in his left carotid.  He denies significant history of lung or kidney problems.      He was taken to the cath lab for anterior STEMI as a life threatening situation. His catheterization showed:    SUMMARY: This is a pretty confusing case he clearly has ST elevation in an odd pattern I do not think this is a [place] cardiomyopathy as he is a bail and the apex is out.  It does not appear that the ramus is causing all of this.  I think this likely could be vasospasm     RECOMMENDATIONS: We will do routine MI care but I am in a started on treatment for vasospasm have encouraged him to stop smoking he does not seem to have great insight into that    An echo performed:    · Estimated EF appears to be in the range of 66 - 70%.  · Left ventricular wall thickness is consistent with mild concentric hypertrophy.  · Left ventricular diastolic dysfunction is noted (grade I) consistent with impaired relaxation.  · Normal right ventricular cavity size and systolic function noted.  · Left atrial cavity size is mildly dilated.  · There is no evidence of pericardial effusion.     On day of discharge, he was pain free and had not had anymore chest pain since his initial arrival.  He had medication changes and was strongly  encouraged to stop smoking, although this does not seem to interest him.    Procedures Performed  Procedure(s):  Left Heart Cath  Left ventriculography  Optical Coherent Tomography  Coronary angiography       Consults     No orders found from 1/8/2020 to 2/7/2020.          Discharge Medications     Your medication list      START taking these medications      Instructions Last Dose Given Next Dose Due   aspirin 81 MG EC tablet  Start taking on:  February 9, 2020  Replaces:  aspirin 325 MG tablet      Take 1 tablet by mouth Daily.       atorvastatin 40 MG tablet  Commonly known as:  LIPITOR  Start taking on:  February 9, 2020      Take 1 tablet by mouth Daily.       clopidogrel 75 MG tablet  Commonly known as:  PLAVIX  Start taking on:  February 9, 2020      Take 1 tablet by mouth Daily.       dilTIAZem  MG 24 hr capsule  Commonly known as:  CARDIZEM CD  Start taking on:  February 9, 2020      Take 1 capsule by mouth Daily.       isosorbide mononitrate 30 MG 24 hr tablet  Commonly known as:  IMDUR  Start taking on:  February 9, 2020      Take 1 tablet by mouth Daily.       nicotine 21 MG/24HR patch  Commonly known as:  NICODERM CQ  Start taking on:  February 9, 2020      Place 1 patch on the skin as directed by provider Daily.          STOP taking these medications    aspirin 325 MG tablet  Replaced by:  aspirin 81 MG EC tablet        naproxen sodium 220 MG tablet  Commonly known as:  ALEVE              Where to Get Your Medications      These medications were sent to [facility] [address] [place] [address], [place] - [address] [address] - [phone]  - [phone]   [address] [address]    Phone:  [phone]   · aspirin 81 MG EC tablet  · atorvastatin 40 MG tablet  · clopidogrel 75 MG tablet  · dilTIAZem  MG 24 hr capsule  · isosorbide mononitrate 30 MG 24 hr tablet  · nicotine 21 MG/24HR patch         Discharge Diet: healthy heat    Activity at Discharge: as tolerated    Discharge disposition:  home    Condition on Discharge: stable    Follow-up Appointments  F/U in office with NP in 1 week  F/U in office with Dr. Chao in 1 month      Test Results Pending at Discharge   Order Current Status    Toxicology Screen, Serum In process           CASS Farooq  [facility]  2/8/2020

## 2020-02-09 ENCOUNTER — READMISSION MANAGEMENT (OUTPATIENT)
Dept: CALL CENTER | Facility: HOSPITAL | Age: 65
End: 2020-02-09

## 2020-02-09 NOTE — OUTREACH NOTE
Prep Survey      Responses   Facility patient discharged from?  [facility]   Is patient eligible?  Yes   Discharge diagnosis  STEMI secondary to vasospasm, s/p cardiac cath.    Does the patient have one of the following disease processes/diagnoses(primary or secondary)?  Acute MI (STEMI,NSTEMI)   Does the patient have Home health ordered?  No   Is there a DME ordered?  No   Comments regarding appointments  Pt to schedule F/U appointments   Prep survey completed?  Yes          Tessie Galo RN

## 2020-02-11 ENCOUNTER — READMISSION MANAGEMENT (OUTPATIENT)
Dept: CALL CENTER | Facility: HOSPITAL | Age: 65
End: 2020-02-11

## 2020-02-11 LAB
ACETONE SERPL-MCNC: NEGATIVE % (ref 0–0.01)
BUTALBITAL SERPL-MCNC: <1 UG/ML (ref 1–10)
CHLORDIAZEP SERPL-MCNC: <0.1 UG/ML (ref 0.1–0.9)
DIAZEPAM SERPL-MCNC: <0.1 UG/ML (ref 0.1–0.9)
ETHANOL SPEC-SCNC: NEGATIVE % (ref 0–0.01)
ISOPROPANOL SERPL-MCNC: NEGATIVE % (ref 0–0.01)
Lab: ABNORMAL
METHANOL SERPL-MCNC: NEGATIVE % (ref 0–0.01)
NORCHLORDIAZEP SERPL-MCNC: <0.1 UG/ML (ref 0.1–0.6)
NORDIAZEPAM SERPL-MCNC: <0.1 UG/ML (ref 0.1–1.4)
PENTOBARB SERPL-MCNC: <1 UG/ML (ref 1–5)
PHENOBARB SERPL-MCNC: <1 UG/ML (ref 15–40)
SALICYLATES SERPL-MCNC: 16 UG/ML (ref 30–250)

## 2020-02-11 NOTE — OUTREACH NOTE
AMI Week 1 Survey      Responses   Facility patient discharged from?  [facility]   Does the patient have one of the following disease processes/diagnoses(primary or secondary)?  Acute MI (STEMI,NSTEMI)   Is there a successful TCM telephone encounter documented?  No   Week 1 attempt successful?  No   Unsuccessful attempts  Attempt 1          Dev Vega RN

## 2020-02-12 ENCOUNTER — READMISSION MANAGEMENT (OUTPATIENT)
Dept: CALL CENTER | Facility: HOSPITAL | Age: 65
End: 2020-02-12

## 2020-02-12 NOTE — OUTREACH NOTE
AMI Week 1 Survey      Responses   Facility patient discharged from?  [facility]   Does the patient have one of the following disease processes/diagnoses(primary or secondary)?  Acute MI (STEMI,NSTEMI)   Is there a successful TCM telephone encounter documented?  No   Week 1 attempt successful?  No   Unsuccessful attempts  Attempt 2          Tammi Flores RN

## 2020-02-17 ENCOUNTER — READMISSION MANAGEMENT (OUTPATIENT)
Dept: CALL CENTER | Facility: HOSPITAL | Age: 65
End: 2020-02-17

## 2020-02-17 NOTE — OUTREACH NOTE
AMI Week 2 Survey      Responses   Facility patient discharged from?  [place]   Does the patient have one of the following disease processes/diagnoses(primary or secondary)?  Acute MI (STEMI,NSTEMI)   Week 2 attempt successful?  Yes   Call start time  0804   Call end time  0812   Meds reviewed with patient/caregiver?  Yes   Is the patient having any side effects they believe may be caused by any medication additions or changes?  No   Does the patient have all prescriptions related to this admission filled (includes statins,anticoagulants,HTN meds,anti-arrhythmia meds)  Yes   Is the patient taking all medications as directed (includes completed medication regime)?  Yes   Comments regarding appointments  cardiologist appt 02/18/20   Does the patient have a primary care provider?   No   Has the patient kept scheduled appointments due by today?  N/A   Has home health visited the patient within 72 hours of discharge?  N/A   Psychosocial issues?  No   Did the patient receive a copy of their discharge instructions?  Yes   Nursing interventions  Reviewed instructions with patient   What is the patient's perception of their health status since discharge?  Improving   Nursing interventions  Nurse provided patient education   Is the patient/caregiver able to teach back signs and symptoms of when to call for help immediately:  Sudden chest discomfort, Sudden discomfort in arms, back, neck or jaw, Shortness of breath at any time, Sudden sweating or clammy skin, Nausea or vomiting, Dizziness or lightheadedness, Irregular or rapid heart rate   Nursing interventions  Nurse provided patient education   Is the patient/caregiver able to teach back lifestyle changes to help prevent MIs  Quit smoking   If the patient is a current smoker, are they able to teach back resources for cessation?  Smoking cessation medications   Additional teach back comments  Call was short-not able to discuss cardiac rehab.   Week 2 call completed?  Yes    Wrap up additional comments  [facility] is improving-denies any cardiac s/s. [facility] has no PCP yet-declined HUB phone number today, but would like it next call. [facility] will keep appt with cardiologist tomorrow.          Emelia Becker RN

## 2020-02-24 ENCOUNTER — READMISSION MANAGEMENT (OUTPATIENT)
Dept: CALL CENTER | Facility: HOSPITAL | Age: 65
End: 2020-02-24

## 2020-02-24 NOTE — OUTREACH NOTE
AMI Week 3 Survey      Responses   Facility patient discharged from?  [place]   Does the patient have one of the following disease processes/diagnoses(primary or secondary)?  Acute MI (STEMI,NSTEMI)   Week 3 attempt successful?  No   Unsuccessful attempts  Attempt 1          Itz Luo RN

## 2020-02-25 ENCOUNTER — READMISSION MANAGEMENT (OUTPATIENT)
Dept: CALL CENTER | Facility: HOSPITAL | Age: 65
End: 2020-02-25

## 2020-02-25 NOTE — OUTREACH NOTE
AMI Week 3 Survey      Responses   Facility patient discharged from?  [place]   Does the patient have one of the following disease processes/diagnoses(primary or secondary)?  Acute MI (STEMI,NSTEMI)   Week 3 attempt successful?  No   Unsuccessful attempts  Attempt 2          Tori Mcgowan RN

## 2020-03-01 ENCOUNTER — HOSPITAL ENCOUNTER (OUTPATIENT)
Facility: HOSPITAL | Age: 65
Setting detail: OBSERVATION
Discharge: HOME | End: 2020-03-03
Attending: EMERGENCY MEDICINE | Admitting: INTERNAL MEDICINE

## 2020-03-01 ENCOUNTER — APPOINTMENT (OUTPATIENT)
Dept: GENERAL RADIOLOGY | Facility: HOSPITAL | Age: 65
End: 2020-03-01

## 2020-03-01 DIAGNOSIS — R07.9 CHEST PAIN, UNSPECIFIED TYPE: Primary | ICD-10-CM

## 2020-03-01 PROBLEM — E78.5 DYSLIPIDEMIA: Status: ACTIVE | Noted: 2020-03-01

## 2020-03-01 PROBLEM — F17.200 TOBACCO DEPENDENCE: Status: ACTIVE | Noted: 2020-03-01

## 2020-03-01 PROBLEM — I10 HTN (HYPERTENSION): Status: ACTIVE | Noted: 2020-03-01

## 2020-03-01 LAB
ALBUMIN SERPL-MCNC: 3.9 G/DL (ref 3.5–5.2)
ALBUMIN/GLOB SERPL: 1.1 G/DL
ALP SERPL-CCNC: 86 U/L (ref 39–117)
ALT SERPL W P-5'-P-CCNC: 12 U/L (ref 1–41)
ANION GAP SERPL CALCULATED.3IONS-SCNC: 12.2 MMOL/L (ref 5–15)
APTT PPP: 23.8 SECONDS (ref 24.3–38.1)
AST SERPL-CCNC: 17 U/L (ref 1–40)
BASOPHILS # BLD AUTO: 0.05 10*3/MM3 (ref 0–0.2)
BASOPHILS NFR BLD AUTO: 0.8 % (ref 0–1.5)
BILIRUB SERPL-MCNC: 0.3 MG/DL (ref 0.2–1.2)
BUN BLD-MCNC: 21 MG/DL (ref 8–23)
BUN/CREAT SERPL: 18.3 (ref 7–25)
CALCIUM SPEC-SCNC: 9.5 MG/DL (ref 8.6–10.5)
CHLORIDE SERPL-SCNC: 107 MMOL/L (ref 98–107)
CK SERPL-CCNC: 59 U/L (ref 20–200)
CO2 SERPL-SCNC: 19.8 MMOL/L (ref 22–29)
CREAT BLD-MCNC: 1.15 MG/DL (ref 0.76–1.27)
DEPRECATED RDW RBC AUTO: 50 FL (ref 37–54)
EOSINOPHIL # BLD AUTO: 0.15 10*3/MM3 (ref 0–0.4)
EOSINOPHIL NFR BLD AUTO: 2.3 % (ref 0.3–6.2)
ERYTHROCYTE [DISTWIDTH] IN BLOOD BY AUTOMATED COUNT: 13.9 % (ref 12.3–15.4)
GFR SERPL CREATININE-BSD FRML MDRD: 64 ML/MIN/1.73
GLOBULIN UR ELPH-MCNC: 3.4 GM/DL
GLUCOSE BLD-MCNC: 94 MG/DL (ref 65–99)
HBA1C MFR BLD: 5.8 % (ref 4.8–5.6)
HCT VFR BLD AUTO: 52 % (ref 37.5–51)
HGB BLD-MCNC: 16.5 G/DL (ref 13–17.7)
IMM GRANULOCYTES # BLD AUTO: 0.04 10*3/MM3 (ref 0–0.05)
IMM GRANULOCYTES NFR BLD AUTO: 0.6 % (ref 0–0.5)
INR PPP: 0.94 (ref 0.9–1.1)
LYMPHOCYTES # BLD AUTO: 1.91 10*3/MM3 (ref 0.7–3.1)
LYMPHOCYTES NFR BLD AUTO: 29.5 % (ref 19.6–45.3)
MAGNESIUM SERPL-MCNC: 2.1 MG/DL (ref 1.6–2.4)
MCH RBC QN AUTO: 31 PG (ref 26.6–33)
MCHC RBC AUTO-ENTMCNC: 31.7 G/DL (ref 31.5–35.7)
MCV RBC AUTO: 97.6 FL (ref 79–97)
MONOCYTES # BLD AUTO: 0.46 10*3/MM3 (ref 0.1–0.9)
MONOCYTES NFR BLD AUTO: 7.1 % (ref 5–12)
NEUTROPHILS # BLD AUTO: 3.86 10*3/MM3 (ref 1.7–7)
NEUTROPHILS NFR BLD AUTO: 59.7 % (ref 42.7–76)
NRBC BLD AUTO-RTO: 0 /100 WBC (ref 0–0.2)
PLATELET # BLD AUTO: 233 10*3/MM3 (ref 140–450)
PMV BLD AUTO: 10.1 FL (ref 6–12)
POTASSIUM BLD-SCNC: 4.8 MMOL/L (ref 3.5–5.2)
PROT SERPL-MCNC: 7.3 G/DL (ref 6–8.5)
PROTHROMBIN TIME: 12.3 SECONDS (ref 12.1–15)
RBC # BLD AUTO: 5.33 10*6/MM3 (ref 4.14–5.8)
SODIUM BLD-SCNC: 139 MMOL/L (ref 136–145)
TROPONIN T SERPL-MCNC: 0.28 NG/ML (ref 0–0.03)
TROPONIN T SERPL-MCNC: <0.01 NG/ML (ref 0–0.03)
TSH SERPL DL<=0.05 MIU/L-ACNC: 0.58 UIU/ML (ref 0.27–4.2)
WBC NRBC COR # BLD: 6.47 10*3/MM3 (ref 3.4–10.8)

## 2020-03-01 PROCEDURE — G0378 HOSPITAL OBSERVATION PER HR: HCPCS

## 2020-03-01 PROCEDURE — 83735 ASSAY OF MAGNESIUM: CPT | Performed by: INTERNAL MEDICINE

## 2020-03-01 PROCEDURE — 85730 THROMBOPLASTIN TIME PARTIAL: CPT | Performed by: PHYSICIAN ASSISTANT

## 2020-03-01 PROCEDURE — 93010 ELECTROCARDIOGRAM REPORT: CPT | Performed by: INTERNAL MEDICINE

## 2020-03-01 PROCEDURE — 84484 ASSAY OF TROPONIN QUANT: CPT | Performed by: INTERNAL MEDICINE

## 2020-03-01 PROCEDURE — 99220 PR INITIAL OBSERVATION CARE/DAY 70 MINUTES: CPT | Performed by: INTERNAL MEDICINE

## 2020-03-01 PROCEDURE — 83036 HEMOGLOBIN GLYCOSYLATED A1C: CPT | Performed by: INTERNAL MEDICINE

## 2020-03-01 PROCEDURE — 25010000002 ENOXAPARIN PER 10 MG: Performed by: INTERNAL MEDICINE

## 2020-03-01 PROCEDURE — 99284 EMERGENCY DEPT VISIT MOD MDM: CPT

## 2020-03-01 PROCEDURE — 84484 ASSAY OF TROPONIN QUANT: CPT | Performed by: EMERGENCY MEDICINE

## 2020-03-01 PROCEDURE — 99285 EMERGENCY DEPT VISIT HI MDM: CPT | Performed by: PHYSICIAN ASSISTANT

## 2020-03-01 PROCEDURE — 93005 ELECTROCARDIOGRAM TRACING: CPT | Performed by: INTERNAL MEDICINE

## 2020-03-01 PROCEDURE — 71045 X-RAY EXAM CHEST 1 VIEW: CPT

## 2020-03-01 PROCEDURE — 96372 THER/PROPH/DIAG INJ SC/IM: CPT

## 2020-03-01 PROCEDURE — 85025 COMPLETE CBC W/AUTO DIFF WBC: CPT | Performed by: EMERGENCY MEDICINE

## 2020-03-01 PROCEDURE — 80053 COMPREHEN METABOLIC PANEL: CPT | Performed by: EMERGENCY MEDICINE

## 2020-03-01 PROCEDURE — 82550 ASSAY OF CK (CPK): CPT | Performed by: INTERNAL MEDICINE

## 2020-03-01 PROCEDURE — 84443 ASSAY THYROID STIM HORMONE: CPT | Performed by: INTERNAL MEDICINE

## 2020-03-01 PROCEDURE — 93005 ELECTROCARDIOGRAM TRACING: CPT | Performed by: EMERGENCY MEDICINE

## 2020-03-01 PROCEDURE — 85610 PROTHROMBIN TIME: CPT | Performed by: PHYSICIAN ASSISTANT

## 2020-03-01 RX ORDER — ACETAMINOPHEN 160 MG/5ML
650 SOLUTION ORAL EVERY 4 HOURS PRN
Status: DISCONTINUED | OUTPATIENT
Start: 2020-03-01 | End: 2020-03-03 | Stop reason: HOSPADM

## 2020-03-01 RX ORDER — CLOPIDOGREL BISULFATE 75 MG/1
75 TABLET ORAL DAILY
Status: DISCONTINUED | OUTPATIENT
Start: 2020-03-01 | End: 2020-03-03 | Stop reason: HOSPADM

## 2020-03-01 RX ORDER — SODIUM CHLORIDE 0.9 % (FLUSH) 0.9 %
10 SYRINGE (ML) INJECTION AS NEEDED
Status: DISCONTINUED | OUTPATIENT
Start: 2020-03-01 | End: 2020-03-03 | Stop reason: HOSPADM

## 2020-03-01 RX ORDER — DILTIAZEM HYDROCHLORIDE 120 MG/1
240 CAPSULE, COATED, EXTENDED RELEASE ORAL
Status: DISCONTINUED | OUTPATIENT
Start: 2020-03-01 | End: 2020-03-02

## 2020-03-01 RX ORDER — AMOXICILLIN 250 MG
2 CAPSULE ORAL NIGHTLY PRN
Status: DISCONTINUED | OUTPATIENT
Start: 2020-03-01 | End: 2020-03-03 | Stop reason: HOSPADM

## 2020-03-01 RX ORDER — ACETAMINOPHEN 325 MG/1
650 TABLET ORAL EVERY 4 HOURS PRN
Status: DISCONTINUED | OUTPATIENT
Start: 2020-03-01 | End: 2020-03-03 | Stop reason: HOSPADM

## 2020-03-01 RX ORDER — SODIUM CHLORIDE 0.9 % (FLUSH) 0.9 %
10 SYRINGE (ML) INJECTION EVERY 12 HOURS SCHEDULED
Status: DISCONTINUED | OUTPATIENT
Start: 2020-03-01 | End: 2020-03-03 | Stop reason: HOSPADM

## 2020-03-01 RX ORDER — SODIUM CHLORIDE 0.9 % (FLUSH) 0.9 %
10 SYRINGE (ML) INJECTION EVERY 12 HOURS SCHEDULED
Status: DISCONTINUED | OUTPATIENT
Start: 2020-03-01 | End: 2020-03-02

## 2020-03-01 RX ORDER — ATORVASTATIN CALCIUM 40 MG/1
40 TABLET, FILM COATED ORAL DAILY
Status: DISCONTINUED | OUTPATIENT
Start: 2020-03-01 | End: 2020-03-02

## 2020-03-01 RX ORDER — ASPIRIN 81 MG/1
234 TABLET, CHEWABLE ORAL ONCE
Status: COMPLETED | OUTPATIENT
Start: 2020-03-01 | End: 2020-03-01

## 2020-03-01 RX ORDER — CHOLECALCIFEROL (VITAMIN D3) 125 MCG
5 CAPSULE ORAL NIGHTLY PRN
Status: DISCONTINUED | OUTPATIENT
Start: 2020-03-01 | End: 2020-03-03 | Stop reason: HOSPADM

## 2020-03-01 RX ORDER — ONDANSETRON 2 MG/ML
4 INJECTION INTRAMUSCULAR; INTRAVENOUS EVERY 6 HOURS PRN
Status: DISCONTINUED | OUTPATIENT
Start: 2020-03-01 | End: 2020-03-03 | Stop reason: HOSPADM

## 2020-03-01 RX ORDER — SODIUM CHLORIDE 9 MG/ML
40 INJECTION, SOLUTION INTRAVENOUS AS NEEDED
Status: DISCONTINUED | OUTPATIENT
Start: 2020-03-01 | End: 2020-03-03 | Stop reason: HOSPADM

## 2020-03-01 RX ORDER — ASPIRIN 81 MG/1
81 TABLET ORAL DAILY
Status: DISCONTINUED | OUTPATIENT
Start: 2020-03-02 | End: 2020-03-03 | Stop reason: HOSPADM

## 2020-03-01 RX ORDER — ACETAMINOPHEN 650 MG/1
650 SUPPOSITORY RECTAL EVERY 4 HOURS PRN
Status: DISCONTINUED | OUTPATIENT
Start: 2020-03-01 | End: 2020-03-03 | Stop reason: HOSPADM

## 2020-03-01 RX ORDER — ISOSORBIDE MONONITRATE 30 MG/1
30 TABLET, EXTENDED RELEASE ORAL
Status: DISCONTINUED | OUTPATIENT
Start: 2020-03-01 | End: 2020-03-02

## 2020-03-01 RX ORDER — ONDANSETRON 4 MG/1
4 TABLET, FILM COATED ORAL EVERY 6 HOURS PRN
Status: DISCONTINUED | OUTPATIENT
Start: 2020-03-01 | End: 2020-03-03 | Stop reason: HOSPADM

## 2020-03-01 RX ADMIN — ASPIRIN 81 MG 243 MG: 81 TABLET ORAL at 13:49

## 2020-03-01 RX ADMIN — ENOXAPARIN SODIUM 40 MG: 40 INJECTION SUBCUTANEOUS at 20:49

## 2020-03-01 RX ADMIN — SODIUM CHLORIDE, PRESERVATIVE FREE 10 ML: 5 INJECTION INTRAVENOUS at 20:49

## 2020-03-01 RX ADMIN — CLOPIDOGREL BISULFATE 75 MG: 75 TABLET ORAL at 18:46

## 2020-03-01 RX ADMIN — ISOSORBIDE MONONITRATE 30 MG: 30 TABLET, EXTENDED RELEASE ORAL at 18:46

## 2020-03-01 RX ADMIN — DILTIAZEM HYDROCHLORIDE 240 MG: 120 CAPSULE, COATED, EXTENDED RELEASE ORAL at 18:46

## 2020-03-01 RX ADMIN — NITROGLYCERIN 1 INCH: 20 OINTMENT TOPICAL at 13:49

## 2020-03-01 RX ADMIN — SODIUM CHLORIDE, PRESERVATIVE FREE 10 ML: 5 INJECTION INTRAVENOUS at 20:50

## 2020-03-01 RX ADMIN — ATORVASTATIN CALCIUM 40 MG: 40 TABLET, FILM COATED ORAL at 18:46

## 2020-03-01 RX ADMIN — ACETAMINOPHEN 650 MG: 325 TABLET, FILM COATED ORAL at 21:37

## 2020-03-01 NOTE — ED PROVIDER NOTES
Subjective   History of Present Illness    Review of Systems    History reviewed. No pertinent past medical history.    No Known Allergies    Past Surgical History:   Procedure Laterality Date   • AMPUTATION FINGER / THUMB Left    • CARDIAC CATHETERIZATION N/A 2/6/2020    Procedure: Left Heart Cath;  Surgeon: Darwin Chao MD;  Location: [facility] CATH INVASIVE LOCATION;  Service: Cardiovascular;  Laterality: N/A;   • CARDIAC CATHETERIZATION N/A 2/6/2020    Procedure: Left ventriculography;  Surgeon: Darwin Chao MD;  [facility];  Service: Cardiovascular;  Laterality: N/A;   • CARDIAC CATHETERIZATION N/A 2/6/2020    Procedure: Optical Coherent Tomography;  Surgeon: Darwin Chao MD;  Location: [facility] CATH INVASIVE LOCATION;  Service: Cardiovascular;  Laterality: N/A;   • CARDIAC CATHETERIZATION N/A 2/6/2020    Procedure: Coronary angiography;  Surgeon: Darwin Chao MD;  Location: [facility] CATH INVASIVE LOCATION;  Service: Cardiovascular;  Laterality: N/A;   • SINUS SURGERY         History reviewed. No pertinent family history.    Social History     Socioeconomic History   • Marital status: Significant Other     Spouse name: Not on file   • Number of children: Not on file   • Years of education: Not on file   • Highest education level: Not on file   Tobacco Use   • Smoking status: Current Every Day Smoker     Packs/day: 2.00     Types: Cigarettes   Substance and Sexual Activity   • Alcohol use: Yes   • Drug use: Not Currently           Objective   Physical Exam    Procedures           ED Course  ED Course as of Mar 01 1416   Sun Mar 01, 2020   1340 Asa, NTG paste     [[place]]      ED Course User Index  [KY] Mercedes Perea, MARZENA                                           [facility]    Final diagnoses:   None

## 2020-03-01 NOTE — ED PROVIDER NOTES
Subjective   History of Present Illness  History of Present Illness    Chief complaint: arm pain    Location: bilat UE    Quality/Severity:  Tight, 9/10    Timing/Duration: 2-3 hours pta. constant    Modifying Factors: Nothing specific makes worse or better.  Patient has not taken anything    Associated Symptoms: Positive chest heaviness, substernal, constant x3 hours.  + shortness of breath.  Denies fevers or chills.  Denies cough.  Denies edema.     Narrative: 64-year-old male presents with arm and chest pain as above.  He states this feels similar to his prior heart attack on 2/6/2020.  He had left heart cath and echocardiogram as below.  They felt his recent STEMI was secondary to vasospasm.  Patient has not been taking his medications in over a week, because he developed neck pain, that has since resolved.    [facility] 2/6/20:  LEFT VENTRICULOGRAPHY: The LV pressure was 140/10.  There was mid anterior to apical akinesis inferoapical akinesis EF is reduced and about 30% the proximal portion of the ventricle is very hyperdynamic.  There was no mitral insufficiency or gradient across the aortic valve on pullback.  CORONARY ANGIOGRAPHY:  Left main: Normal  Left anterior descending: Normal proximally 10% luminal irregularities in the mid and distal portion, diagonals are free of disease  Ramus intermedius: There is a 50 to 60% origin lesion.  OCT revealed that it was about a 50 to 60% lesion and was not a ruptured plaque.  Circumflex: Normal proximally 10% luminal irregularities in the midportion  RCA: Is a dominant vessel.  Diffuse 20 to 30% proximal lesion normal in the mid and distal portion some 30% origin PDA disease  SUMMARY: This is a pretty confusing case he clearly has ST elevation in an odd pattern I do not think this is a [place] cardiomyopathy as he is a bail and the apex is out.  It does not appear that the ramus is causing all of this.  I think this likely could be vasospasm  RECOMMENDATIONS: We will do  routine MI care but I am in a started on treatment for vasospasm have encouraged him to stop smoking he does not seem to have great insight into that    TTE 2/7/20:  · Estimated EF appears to be in the range of 66 - 70%.  · Left ventricular wall thickness is consistent with mild concentric hypertrophy.  · Left ventricular diastolic dysfunction is noted (grade I) consistent with impaired relaxation.  · Normal right ventricular cavity size and systolic function noted.  · Left atrial cavity size is mildly dilated.  · There is no evidence of pericardial effusion.       Review of Systems   Constitutional: Negative.  Negative for chills, fatigue and fever.   HENT: Negative.  Negative for congestion, rhinorrhea and sore throat.    Eyes: Negative.    Respiratory: Positive for shortness of breath. Negative for cough.    Cardiovascular: Positive for chest pain. Negative for palpitations and leg swelling.   Gastrointestinal: Negative.  Negative for abdominal pain, nausea and vomiting.   Endocrine: Negative.    Genitourinary: Negative.    Musculoskeletal: Negative.    Skin: Negative.    Neurological: Negative.  Negative for dizziness and syncope.   Hematological: Negative.    Psychiatric/Behavioral: Negative.    All other systems reviewed and are negative.      Past Medical History:   Diagnosis Date   • Coronary artery disease        No Known Allergies    Past Surgical History:   Procedure Laterality Date   • AMPUTATION FINGER / THUMB Left    • CARDIAC CATHETERIZATION N/A 2/6/2020    Procedure: Left Heart Cath;  Surgeon: Darwin Chao MD;  Location: [facility] INVASIVE LOCATION;  Service: Cardiovascular;  Laterality: N/A;   • CARDIAC CATHETERIZATION N/A 2/6/2020    Procedure: Left ventriculography;  Surgeon: Darwin Chao MD;  Location: [facility] INVASIVE LOCATION;  Service: Cardiovascular;  Laterality: N/A;   • CARDIAC CATHETERIZATION N/A 2/6/2020    Procedure: Optical Coherent Tomography;  Surgeon: Darwin Chao  MD;  Location: [facility] CATH INVASIVE LOCATION;  Service: Cardiovascular;  Laterality: N/A;   • CARDIAC CATHETERIZATION N/A 2/6/2020    Procedure: Coronary angiography;  Surgeon: Darwin Chao MD;  Location: [facility] CATH INVASIVE LOCATION;  Service: Cardiovascular;  Laterality: N/A;   • SINUS SURGERY         History reviewed. No pertinent family history.    Social History     Socioeconomic History   • Marital status: Significant Other     Spouse name: Not on file   • Number of children: Not on file   • Years of education: Not on file   • Highest education level: Not on file   Tobacco Use   • Smoking status: Current Every Day Smoker     Packs/day: 2.00     Types: Cigarettes   Substance and Sexual Activity   • Alcohol use: Yes   • Drug use: Not Currently       Current Facility-Administered Medications:   •  aspirin chewable tablet 243 mg, 243 mg, Oral, Once, Mercedes Perea PA-C  •  nitroglycerin (NITROSTAT) ointment 1 inch, 1 inch, Topical, Once, Mercedes Perea PA-C  •  Insert peripheral IV, , , Once **AND** sodium chloride 0.9 % flush 10 mL, 10 mL, Intravenous, PRN, Mercedes Perea PA-C    Current Outpatient Medications:   •  aspirin 81 MG EC tablet, Take 1 tablet by mouth Daily., Disp: 30 tablet, Rfl: 11  •  atorvastatin (LIPITOR) 40 MG tablet, Take 1 tablet by mouth Daily., Disp: 30 tablet, Rfl: 11  •  clopidogrel (PLAVIX) 75 MG tablet, Take 1 tablet by mouth Daily., Disp: 30 tablet, Rfl: 11  •  dilTIAZem CD (CARDIZEM CD) 240 MG 24 hr capsule, Take 1 capsule by mouth Daily., Disp: 30 capsule, Rfl: 11  •  isosorbide mononitrate (IMDUR) 30 MG 24 hr tablet, Take 1 tablet by mouth Daily., Disp: 30 tablet, Rfl: 11        Objective   Physical Exam  Vitals:    03/01/20 1332   BP: (!) 166/108   Pulse: 71   Resp: 18   SpO2: 99%   temp: 98.1    GENERAL: a/o x 4, NAD  SKIN: Warm pink and dry   HEENT:  PERRLA, EOM intact, conjunctiva normal, sclera clear  NECK: supple, no JVD  LUNGS: Clear to auscultation bilaterally  without wheezes, rales or rhonchi.  No accessory muscle use and no nasal flaring.  CARDIAC:  Regular rate and rhythm, S1-S2.  No murmurs, rubs or gallops.  No peripheral edema.  Equal pulses bilaterally.  ABDOMEN: Soft, nontender, nondistended.  No guarding or rebound tenderness.  Normal bowel sounds.  MUSCULOSKELETAL: Moves all extremities well.  No deformity.  NEURO: Cranial nerves II through XII grossly intact.  No gross focal deficits.  Alert.  Normal speech and motor.  PSYCH: Normal mood and affect        Procedures           ED Course  ED Course as of Mar 01 1517   Sun Mar 01, 2020   1340 Asa, NTG paste     [KY]   1443 Pain now 6/10 with paste on    [KY]   1506 CONSULT  Discussed case with Dr Ness  Reviewed history, exam, results and treatments.  Discussed concerns and plan of care. Ok to admit here.       [KY]   1516 Dr. Daley accepts to obs.  tele    [KY]      ED Course User Index  [KY] Mercedes Perea, MARZENA        EKG         EKG time / Interpretation time: 1334 / 1334  Rhythm/Rate: NSR 70   MO: 154  QRS, axis: 17   QTc 470  ST and T waves: inversion I, aVR, aVL, V2, V3, V4, V5, V6  EKG Tracing Interpreted Contemporaneously by me, independently viewed by me and MD.  Not significantly changed compared to prior 2/8/2020    Reviewed CXR. Independently viewed by me. Interpreted by radiologist. Discussed with pt.  Xr Chest 1 View    Result Date: 3/1/2020  Narrative: CHEST X-RAY, 3/1/2020  HISTORY:  64-year-old male in the ED complaining of 2 day history of chest pain, shortness of air in left arm pain.  TECHNIQUE: AP portable upright chest x-ray.  FINDINGS: Heart size and pulmonary vascularity are within normal limits. The lungs are expanded and clear. No visible pulmonary edema, focal infiltrate or pleural effusion.     Impression: No active disease. Signer Name: Niall Luna MD  Signed: 3/1/2020 2:35 PM  Workstation Name: [email or web address]  [facility]    HEART score: 6, moderate  risk    Results for orders placed or performed during the hospital encounter of 03/01/20   Comprehensive Metabolic Panel   Result Value Ref Range    Glucose 94 65 - 99 mg/dL    BUN 21 8 - 23 mg/dL    Creatinine 1.15 0.76 - 1.27 mg/dL    Sodium 139 136 - 145 mmol/L    Potassium 4.8 3.5 - 5.2 mmol/L    Chloride 107 98 - 107 mmol/L    CO2 19.8 (L) 22.0 - 29.0 mmol/L    Calcium 9.5 8.6 - 10.5 mg/dL    Total Protein 7.3 6.0 - 8.5 g/dL    Albumin 3.90 3.50 - 5.20 g/dL    ALT (SGPT) 12 1 - 41 U/L    AST (SGOT) 17 1 - 40 U/L    Alkaline Phosphatase 86 39 - 117 U/L    Total Bilirubin 0.3 0.2 - 1.2 mg/dL    eGFR Non African Amer 64 >60 mL/min/1.73    Globulin 3.4 gm/dL    A/G Ratio 1.1 g/dL    BUN/Creatinine Ratio 18.3 7.0 - 25.0    Anion Gap 12.2 5.0 - 15.0 mmol/L   Troponin   Result Value Ref Range    Troponin T <0.010 0.000 - 0.030 ng/mL   CBC Auto Differential   Result Value Ref Range    WBC 6.47 3.40 - 10.80 10*3/mm3    RBC 5.33 4.14 - 5.80 10*6/mm3    Hemoglobin 16.5 13.0 - 17.7 g/dL    Hematocrit 52.0 (H) 37.5 - 51.0 %    MCV 97.6 (H) 79.0 - 97.0 fL    MCH 31.0 26.6 - 33.0 pg    MCHC 31.7 31.5 - 35.7 g/dL    RDW 13.9 12.3 - 15.4 %    RDW-SD 50.0 37.0 - 54.0 fl    MPV 10.1 6.0 - 12.0 fL    Platelets 233 140 - 450 10*3/mm3    Neutrophil % 59.7 42.7 - 76.0 %    Lymphocyte % 29.5 19.6 - 45.3 %    Monocyte % 7.1 5.0 - 12.0 %    Eosinophil % 2.3 0.3 - 6.2 %    Basophil % 0.8 0.0 - 1.5 %    Immature Grans % 0.6 (H) 0.0 - 0.5 %    Neutrophils, Absolute 3.86 1.70 - 7.00 10*3/mm3    Lymphocytes, Absolute 1.91 0.70 - 3.10 10*3/mm3    Monocytes, Absolute 0.46 0.10 - 0.90 10*3/mm3    Eosinophils, Absolute 0.15 0.00 - 0.40 10*3/mm3    Basophils, Absolute 0.05 0.00 - 0.20 10*3/mm3    Immature Grans, Absolute 0.04 0.00 - 0.05 10*3/mm3    nRBC 0.0 0.0 - 0.2 /100 WBC   Protime-INR   Result Value Ref Range    Protime 12.3 12.1 - 15.0 Seconds    INR 0.94 0.90 - 1.10   aPTT   Result Value Ref Range    PTT 23.8 (L) 24.3 - 38.1 seconds                MDM  Number of Diagnoses or Management Options  Chest pain, unspecified type: new and requires workup     Amount and/or Complexity of Data Reviewed  Clinical lab tests: reviewed and ordered  Tests in the radiology section of CPT®: reviewed and ordered  Tests in the medicine section of CPT®: reviewed and ordered  Decide to obtain previous medical records or to obtain history from someone other than the patient: yes  Obtain history from someone other than the patient: yes  Review and summarize past medical records: yes  Discuss the patient with other providers: yes  Independent visualization of images, tracings, or specimens: yes    Risk of Complications, Morbidity, and/or Mortality  Presenting problems: high  Diagnostic procedures: high  Management options: high    Patient Progress  Patient progress: improved    My differential diagnosis for chest pain includes but is not limited to:  Muscle strain, costochondritis, myositis, pleurisy, rib fracture, intercostal neuritis, herpes zoster, tumor, myocardial infarction, coronary syndrome, unstable angina, angina, aortic dissection, mitral valve prolapse, pericarditis, palpitations, pulmonary embolus, pneumonia, pneumothorax, lung cancer, GERD, esophagitis, esophageal spasm    Final diagnoses:   Chest pain, unspecified type     Dictated utilizing Dragon dictation         Mercedes Perea, PAAshleyC  03/01/20 1512

## 2020-03-02 ENCOUNTER — APPOINTMENT (OUTPATIENT)
Dept: CARDIOLOGY | Facility: HOSPITAL | Age: 65
End: 2020-03-02

## 2020-03-02 ENCOUNTER — APPOINTMENT (OUTPATIENT)
Dept: ULTRASOUND IMAGING | Facility: HOSPITAL | Age: 65
End: 2020-03-02

## 2020-03-02 ENCOUNTER — APPOINTMENT (OUTPATIENT)
Dept: CT IMAGING | Facility: HOSPITAL | Age: 65
End: 2020-03-02

## 2020-03-02 LAB
ANION GAP SERPL CALCULATED.3IONS-SCNC: 11 MMOL/L (ref 5–15)
ARTICHOKE IGE QN: 108 MG/DL (ref 0–100)
BASOPHILS # BLD AUTO: 0.05 10*3/MM3 (ref 0–0.2)
BASOPHILS NFR BLD AUTO: 0.7 % (ref 0–1.5)
BH CV ECHO MEAS - BSA(HAYCOCK): 2 M^2
BH CV ECHO MEAS - BSA: 2 M^2
BH CV ECHO MEAS - BZI_BMI: 24 KILOGRAMS/M^2
BH CV ECHO MEAS - BZI_METRIC_HEIGHT: 180.3 CM
BH CV ECHO MEAS - BZI_METRIC_WEIGHT: 78 KG
BH CV ECHO MEAS - EDV(CUBED): 96.1 ML
BH CV ECHO MEAS - EDV(MOD-SP2): 65.6 ML
BH CV ECHO MEAS - EDV(MOD-SP4): 64.9 ML
BH CV ECHO MEAS - EDV(TEICH): 96.3 ML
BH CV ECHO MEAS - EF(CUBED): 83.9 %
BH CV ECHO MEAS - EF(MOD-BP): 70 %
BH CV ECHO MEAS - EF(MOD-SP2): 66.3 %
BH CV ECHO MEAS - EF(MOD-SP4): 72.3 %
BH CV ECHO MEAS - EF(TEICH): 77.1 %
BH CV ECHO MEAS - ESV(CUBED): 15.4 ML
BH CV ECHO MEAS - ESV(MOD-SP2): 22.1 ML
BH CV ECHO MEAS - ESV(MOD-SP4): 18 ML
BH CV ECHO MEAS - ESV(TEICH): 22.1 ML
BH CV ECHO MEAS - FS: 45.6 %
BH CV ECHO MEAS - IVS/LVPW: 1
BH CV ECHO MEAS - IVSD: 0.71 CM
BH CV ECHO MEAS - LV DIASTOLIC VOL/BSA (35-75): 32.8 ML/M^2
BH CV ECHO MEAS - LV MASS(C)D: 101 GRAMS
BH CV ECHO MEAS - LV MASS(C)DI: 51.1 GRAMS/M^2
BH CV ECHO MEAS - LV SYSTOLIC VOL/BSA (12-30): 9.1 ML/M^2
BH CV ECHO MEAS - LVIDD: 4.6 CM
BH CV ECHO MEAS - LVIDS: 2.5 CM
BH CV ECHO MEAS - LVLD AP2: 7.5 CM
BH CV ECHO MEAS - LVLD AP4: 7 CM
BH CV ECHO MEAS - LVLS AP2: 5.8 CM
BH CV ECHO MEAS - LVLS AP4: 5.6 CM
BH CV ECHO MEAS - LVPWD: 0.71 CM
BH CV ECHO MEAS - SI(CUBED): 40.8 ML/M^2
BH CV ECHO MEAS - SI(MOD-SP2): 22 ML/M^2
BH CV ECHO MEAS - SI(MOD-SP4): 23.7 ML/M^2
BH CV ECHO MEAS - SI(TEICH): 37.5 ML/M^2
BH CV ECHO MEAS - SV(CUBED): 80.6 ML
BH CV ECHO MEAS - SV(MOD-SP2): 43.5 ML
BH CV ECHO MEAS - SV(MOD-SP4): 46.9 ML
BH CV ECHO MEAS - SV(TEICH): 74.2 ML
BH CV VAS BP LEFT ARM: NORMAL MMHG
BUN BLD-MCNC: 27 MG/DL (ref 8–23)
BUN/CREAT SERPL: 26.2 (ref 7–25)
CALCIUM SPEC-SCNC: 9.5 MG/DL (ref 8.6–10.5)
CHLORIDE SERPL-SCNC: 103 MMOL/L (ref 98–107)
CHOLEST SERPL-MCNC: 187 MG/DL (ref 0–200)
CO2 SERPL-SCNC: 23 MMOL/L (ref 22–29)
CREAT BLD-MCNC: 1.03 MG/DL (ref 0.76–1.27)
DEPRECATED RDW RBC AUTO: 49.4 FL (ref 37–54)
EOSINOPHIL # BLD AUTO: 0.18 10*3/MM3 (ref 0–0.4)
EOSINOPHIL NFR BLD AUTO: 2.4 % (ref 0.3–6.2)
ERYTHROCYTE [DISTWIDTH] IN BLOOD BY AUTOMATED COUNT: 13.9 % (ref 12.3–15.4)
ERYTHROCYTE [SEDIMENTATION RATE] IN BLOOD: 9 MM/HR (ref 0–20)
GFR SERPL CREATININE-BSD FRML MDRD: 73 ML/MIN/1.73
GLUCOSE BLD-MCNC: 125 MG/DL (ref 65–99)
HCT VFR BLD AUTO: 44.1 % (ref 37.5–51)
HDLC SERPL-MCNC: 32 MG/DL (ref 40–60)
HGB BLD-MCNC: 14.7 G/DL (ref 13–17.7)
IMM GRANULOCYTES # BLD AUTO: 0.01 10*3/MM3 (ref 0–0.05)
IMM GRANULOCYTES NFR BLD AUTO: 0.1 % (ref 0–0.5)
INR PPP: 0.89 (ref 0.9–1.1)
LDLC SERPL CALC-MCNC: ABNORMAL MG/DL
LDLC/HDLC SERPL: ABNORMAL {RATIO}
LYMPHOCYTES # BLD AUTO: 2.43 10*3/MM3 (ref 0.7–3.1)
LYMPHOCYTES NFR BLD AUTO: 32.2 % (ref 19.6–45.3)
MAGNESIUM SERPL-MCNC: 2.1 MG/DL (ref 1.6–2.4)
MAXIMAL PREDICTED HEART RATE: 156 BPM
MCH RBC QN AUTO: 32.1 PG (ref 26.6–33)
MCHC RBC AUTO-ENTMCNC: 33.3 G/DL (ref 31.5–35.7)
MCV RBC AUTO: 96.3 FL (ref 79–97)
MONOCYTES # BLD AUTO: 0.48 10*3/MM3 (ref 0.1–0.9)
MONOCYTES NFR BLD AUTO: 6.4 % (ref 5–12)
NEUTROPHILS # BLD AUTO: 4.39 10*3/MM3 (ref 1.7–7)
NEUTROPHILS NFR BLD AUTO: 58.2 % (ref 42.7–76)
PLATELET # BLD AUTO: 266 10*3/MM3 (ref 140–450)
PMV BLD AUTO: 9.9 FL (ref 6–12)
POTASSIUM BLD-SCNC: 4.2 MMOL/L (ref 3.5–5.2)
PROTHROMBIN TIME: 11.7 SECONDS (ref 12.1–15)
RBC # BLD AUTO: 4.58 10*6/MM3 (ref 4.14–5.8)
SODIUM BLD-SCNC: 137 MMOL/L (ref 136–145)
STRESS TARGET HR: 133 BPM
TRIGL SERPL-MCNC: 423 MG/DL (ref 0–150)
TROPONIN T SERPL-MCNC: 0.05 NG/ML (ref 0–0.03)
TROPONIN T SERPL-MCNC: 0.06 NG/ML (ref 0–0.03)
TROPONIN T SERPL-MCNC: 0.09 NG/ML (ref 0–0.03)
TROPONIN T SERPL-MCNC: 0.17 NG/ML (ref 0–0.03)
VLDLC SERPL-MCNC: ABNORMAL MG/DL
WBC NRBC COR # BLD: 7.54 10*3/MM3 (ref 3.4–10.8)

## 2020-03-02 PROCEDURE — 93880 EXTRACRANIAL BILAT STUDY: CPT

## 2020-03-02 PROCEDURE — 85652 RBC SED RATE AUTOMATED: CPT | Performed by: INTERNAL MEDICINE

## 2020-03-02 PROCEDURE — 93010 ELECTROCARDIOGRAM REPORT: CPT | Performed by: INTERNAL MEDICINE

## 2020-03-02 PROCEDURE — 99406 BEHAV CHNG SMOKING 3-10 MIN: CPT | Performed by: INTERNAL MEDICINE

## 2020-03-02 PROCEDURE — 85610 PROTHROMBIN TIME: CPT | Performed by: INTERNAL MEDICINE

## 2020-03-02 PROCEDURE — G0378 HOSPITAL OBSERVATION PER HR: HCPCS

## 2020-03-02 PROCEDURE — 83721 ASSAY OF BLOOD LIPOPROTEIN: CPT | Performed by: INTERNAL MEDICINE

## 2020-03-02 PROCEDURE — 93308 TTE F-UP OR LMTD: CPT

## 2020-03-02 PROCEDURE — 25010000002 ENOXAPARIN PER 10 MG: Performed by: INTERNAL MEDICINE

## 2020-03-02 PROCEDURE — 99244 OFF/OP CNSLTJ NEW/EST MOD 40: CPT | Performed by: INTERNAL MEDICINE

## 2020-03-02 PROCEDURE — 80061 LIPID PANEL: CPT | Performed by: INTERNAL MEDICINE

## 2020-03-02 PROCEDURE — 96372 THER/PROPH/DIAG INJ SC/IM: CPT

## 2020-03-02 PROCEDURE — 93308 TTE F-UP OR LMTD: CPT | Performed by: INTERNAL MEDICINE

## 2020-03-02 PROCEDURE — 0 IOPAMIDOL PER 1 ML: Performed by: INTERNAL MEDICINE

## 2020-03-02 PROCEDURE — 85025 COMPLETE CBC W/AUTO DIFF WBC: CPT | Performed by: INTERNAL MEDICINE

## 2020-03-02 PROCEDURE — 70496 CT ANGIOGRAPHY HEAD: CPT

## 2020-03-02 PROCEDURE — 84484 ASSAY OF TROPONIN QUANT: CPT | Performed by: INTERNAL MEDICINE

## 2020-03-02 PROCEDURE — 25010000002 PERFLUTREN (DEFINITY) 8.476 MG IN SODIUM CHLORIDE 0.9 % 10 ML INJECTION: Performed by: INTERNAL MEDICINE

## 2020-03-02 PROCEDURE — 83735 ASSAY OF MAGNESIUM: CPT | Performed by: INTERNAL MEDICINE

## 2020-03-02 PROCEDURE — 99226 PR SBSQ OBSERVATION CARE/DAY 35 MINUTES: CPT | Performed by: INTERNAL MEDICINE

## 2020-03-02 PROCEDURE — 80048 BASIC METABOLIC PNL TOTAL CA: CPT | Performed by: INTERNAL MEDICINE

## 2020-03-02 PROCEDURE — 70498 CT ANGIOGRAPHY NECK: CPT

## 2020-03-02 RX ORDER — NICOTINE 21 MG/24HR
1 PATCH, TRANSDERMAL 24 HOURS TRANSDERMAL
Status: DISCONTINUED | OUTPATIENT
Start: 2020-03-02 | End: 2020-03-03 | Stop reason: HOSPADM

## 2020-03-02 RX ORDER — ISOSORBIDE MONONITRATE 60 MG/1
60 TABLET, EXTENDED RELEASE ORAL NIGHTLY
Status: DISCONTINUED | OUTPATIENT
Start: 2020-03-02 | End: 2020-03-03 | Stop reason: HOSPADM

## 2020-03-02 RX ADMIN — ISOSORBIDE MONONITRATE 60 MG: 60 TABLET, EXTENDED RELEASE ORAL at 22:02

## 2020-03-02 RX ADMIN — CLOPIDOGREL BISULFATE 75 MG: 75 TABLET ORAL at 09:50

## 2020-03-02 RX ADMIN — NICOTINE 1 PATCH: 14 PATCH TRANSDERMAL at 09:50

## 2020-03-02 RX ADMIN — PERFLUTREN 2 ML: 6.52 INJECTION, SUSPENSION INTRAVENOUS at 14:00

## 2020-03-02 RX ADMIN — SODIUM CHLORIDE, PRESERVATIVE FREE 10 ML: 5 INJECTION INTRAVENOUS at 22:03

## 2020-03-02 RX ADMIN — ASPIRIN 81 MG: 81 TABLET, COATED ORAL at 09:50

## 2020-03-02 RX ADMIN — ENOXAPARIN SODIUM 40 MG: 40 INJECTION SUBCUTANEOUS at 22:02

## 2020-03-02 RX ADMIN — IOPAMIDOL 100 ML: 755 INJECTION, SOLUTION INTRAVENOUS at 16:45

## 2020-03-02 RX ADMIN — SODIUM CHLORIDE, PRESERVATIVE FREE 10 ML: 5 INJECTION INTRAVENOUS at 09:51

## 2020-03-02 NOTE — NURSING NOTE
Discharge Planning Assessment   Tricia Flores     Patient Name: Sammy Roblero  MRN: [id number]  Today's Date: 3/2/2020    Admit Date: 3/1/2020    Discharge Needs Assessment     Row Name 03/02/20 0384       Living Environment    Lives With  significant other    Name(s) of Who Lives With Patient  Kierra Franz - SERA    Current Living Arrangements  home/apartment/condo    Primary Care Provided by  self    Provides Primary Care For  no one    Family Caregiver if Needed  significant other    Family Caregiver Names  Kierra     Quality of Family Relationships  supportive    Able to Return to Prior Arrangements  yes       Resource/Environmental Concerns    Resource/Environmental Concerns  financial    Financial Concerns  medicine, unable to afford    Transportation Concerns  car, none       Transition Planning    Patient/Family Anticipates Transition to  home;home with family    Patient/Family Anticipated Services at Transition  none    Transportation Anticipated  family or friend will provide       Discharge Needs Assessment    Readmission Within the Last 30 Days  no previous admission in last 30 days    Concerns to be Addressed  no discharge needs identified    Equipment Currently Used at Home  none    Anticipated Changes Related to Illness  none    Equipment Needed After Discharge  none    Provided Post Acute Provider List?  N/A    N/A Provider List Comment  Pt plan to return home with no needs         Discharge Plan     Row Name 03/02/20 4375       Plan    Plan  d/c home     Patient/Family in Agreement with Plan  yes    Plan Comments  Into room to interview pt.  Facesheet verified.  Pt lives in a home with his s/o Kierra.  Pt is typically independent with ADL;s and uses no DME/HH/REhab services.  Pt had been taking his medications as ordered but then quit taking all medications because he stated they made his neck hurt.  Pt has not been taking any medications or seeing a PCP.   He does follow up with his cardiologist.   Pt had no insurance but he has taken steps to reinstate his medicaid benefits.  Pt declined offer of help to obtain a PCP.  CM will follow up on costs of medications after testing is complete and scripts have been written.          Destination      Coordination has not been started for this encounter.      Durable Medical Equipment      Coordination has not been started for this encounter.      Dialysis/Infusion      Coordination has not been started for this encounter.      Home Medical Care      Coordination has not been started for this encounter.      Therapy      Coordination has not been started for this encounter.      Community Resources      Coordination has not been started for this encounter.          Demographic Summary     Row Name 03/02/20 1349       General Information    Admission Type  observation    Arrived From  home    Referral Source  admission list    Reason for Consult  discharge planning    Preferred Language  English     Used During This Interaction  no        Functional Status     Row Name 03/02/20 1347       Functional Status    Usual Activity Tolerance  excellent    Current Activity Tolerance  excellent       Functional Status, IADL    Medications  independent    Meal Preparation  independent    Housekeeping  independent    Laundry  independent    Shopping  independent       Mental Status    General Appearance WDL  WDL       Mental Status Summary    Recent Changes in Mental Status/Cognitive Functioning  no changes        Psychosocial    No documentation.       Abuse/Neglect    No documentation.       Legal    No documentation.       Substance Abuse    No documentation.       Patient Forms    No documentation.           Adebayo Booker RN

## 2020-03-02 NOTE — PLAN OF CARE
Problem: Patient Care Overview  Goal: Plan of Care Review  Outcome: Ongoing (interventions implemented as appropriate)  Flowsheets (Taken 3/1/2020 6169)  Progress: no change  Plan of Care Reviewed With: patient  Note:   Admitted with cardiology consult they have not seen patient yet. EKG and troponin will be repeated. Nitro paste on. Labs ordered for tomorrow. Smokes 2 ppd. SI at bedside

## 2020-03-02 NOTE — H&P
[facility] Medicine Services      Patient Name: Sammy Roblero  : 1955  MRN: [id number]  Primary Care Physician: Ivet, No Known  Date of admission: 3/1/2020    Patient Care Team:  Provider, No Known as PCP - General          Subjective   History Present Illness     Chief Complaint:   Chief Complaint   Patient presents with   • Arm Pain       Mr. Roblero is a 64 y.o.  presents to [facility] complaining of chest pain    He has history of STEMI evaluated on  at [facility] with anterior ST elevation MI.  He underwent cardiac cath that showed the following     CORONARY ANGIOGRAPHY:  Left main: Normal  Left anterior descending: Normal proximally 10% luminal irregularities in the mid and distal portion, diagonals are free of disease  Ramus intermedius: There is a 50 to 60% origin lesion.  OCT revealed that it was about a 50 to 60% lesion and was not a ruptured plaque.  Circumflex: Normal proximally 10% luminal irregularities in the midportion  RCA: Is a dominant vessel.  Diffuse 20 to 30% proximal lesion normal in the mid and distal portion some 30% origin PDA disease       Patient was told that he had coronary vasospasms.  He was started on medications that he felt related to his recent right neck pain and he stopped taking the medications for about 2 weeks and comes back with repeat episode of chest discomfort left side with bilateral achiness in the bilateral upper extremities.  He reports no weakness shortness of breath cough or phlegm except his usual cough with expectoration probably related to COPD and chronic tobacco use.  He denies any hemoptysis lower extremity swelling but he has muscle aches in his legs.  He still smokes and drinks about a bottle of whiskey that lasts over a week.    His previous echo shows diastolic dysfunction but with good systolic function.      Initial evaluation the ER heart today shows EKG changes that showed no  significant change from previous EKGs.  Initial troponin was negative.  Dr. Ness was called from ER and apparently he is agreeable on keeping the patient in the hospital for further evaluation in the morning.  Patient reports that his pain is improved.  He has a nitro patch on his chest.  His A1c 5.8.  Creatinine is 1.1.  Reports no previous history of DVT or PE in the past.  Chest x-ray shows no acute findings.    ROS   All other systems reviewed and neg         Personal History     Past Medical History:   Past Medical History:   Diagnosis Date   • Arthritis    • COPD (chronic obstructive pulmonary disease) (CMS/[facility])    • Coronary artery disease    • Elevated cholesterol    • Hx of seasonal allergies    • Hypertension        Surgical History:      Past Surgical History:   Procedure Laterality Date   • AMPUTATION FINGER / THUMB Left    • CARDIAC CATHETERIZATION N/A 2/6/2020    Procedure: Left Heart Cath;  Surgeon: Darwin Chao MD;  Location: [facility] CATH INVASIVE LOCATION;  Service: Cardiovascular;  Laterality: N/A;   • CARDIAC CATHETERIZATION N/A 2/6/2020    Procedure: Left ventriculography;  Surgeon: Darwin Chao MD;  Location: [facility] CATH INVASIVE LOCATION;  Service: Cardiovascular;  Laterality: N/A;   • CARDIAC CATHETERIZATION N/A 2/6/2020    Procedure: Optical Coherent Tomography;  Surgeon: Darwin Chao MD;  Location: [facility] CATH INVASIVE LOCATION;  Service: Cardiovascular;  Laterality: N/A;   • CARDIAC CATHETERIZATION N/A 2/6/2020    Procedure: Coronary angiography;  Surgeon: Darwin Chao MD;  Location: [facility] CATH INVASIVE LOCATION;  Service: Cardiovascular;  Laterality: N/A;   • SINUS SURGERY             Family History: family history is not on file. Otherwise pertinent FHx was reviewed and unremarkable.     Social History:  reports that he has been smoking cigarettes. He has been smoking about 2.00 packs per day. He has quit using smokeless tobacco.  His smokeless tobacco use included chew.  He reports that he drinks about 3.0 standard drinks of alcohol per week. He reports that he has current or past drug history.      Medications:  Prior to Admission medications    Medication Sig Start Date End Date Taking? Authorizing Provider   aspirin 81 MG EC tablet Take 1 tablet by mouth Daily. 2/9/20  Yes Gayle Simpson APRN   atorvastatin (LIPITOR) 40 MG tablet Take 1 tablet by mouth Daily. 2/9/20  Yes Gayle Simpson APRN   clopidogrel (PLAVIX) 75 MG tablet Take 1 tablet by mouth Daily. 2/9/20  Yes Gayle Simpson APRN   dilTIAZem CD (CARDIZEM CD) 240 MG 24 hr capsule Take 1 capsule by mouth Daily. 2/9/20  Yes Gayle Simpson APRN   isosorbide mononitrate (IMDUR) 30 MG 24 hr tablet Take 1 tablet by mouth Daily. 2/9/20  Yes Gayle Simpson APRN   nicotine (NICODERM CQ) 21 MG/24HR patch Place 1 patch on the skin as directed by provider Daily. 2/9/20 3/1/20  Gayle Simpson APRN       Allergies:  No Known Allergies    Objective   Objective     Vital Signs  Temp:  [98.1 °F (36.7 °C)-98.5 °F (36.9 °C)] 98.5 °F (36.9 °C)  Heart Rate:  [63-73] 69  Resp:  [16-18] 16  BP: (111-166)/() 140/84  SpO2:  [96 %-99 %] 98 %  on   ;   Device (Oxygen Therapy): room air  Body mass index is 24.03 kg/m².    Physical Exam   Constitutional: He is oriented to person, place, and time. He appears well-developed and well-nourished. No distress.   HENT:   Head: Normocephalic and atraumatic.   Right Ear: External ear normal.   Left Ear: External ear normal.   Nose: Nose normal.   Mouth/Throat: Oropharynx is clear and moist. No oropharyngeal exudate.   Eyes: Pupils are equal, round, and reactive to light. Conjunctivae and EOM are normal. Right eye exhibits no discharge. Left eye exhibits no discharge. No scleral icterus.   Neck: Normal range of motion. No JVD present. No tracheal deviation present. No thyromegaly present.   Cardiovascular: Normal rate, regular rhythm, normal heart sounds and intact distal pulses. Exam reveals no gallop and  no friction rub.   No murmur heard.  Pulmonary/Chest: Effort normal and breath sounds normal. No stridor. No respiratory distress. He has no wheezes. He has no rales. He exhibits no tenderness.   Abdominal: Soft. Bowel sounds are normal. He exhibits no distension and no mass. There is no tenderness. There is no rebound and no guarding. No hernia.   Musculoskeletal: Normal range of motion. He exhibits no edema, tenderness or deformity.   Partial amputation left fourth finger   Lymphadenopathy:     He has no cervical adenopathy.   Neurological: He is alert and oriented to person, place, and time. No cranial nerve deficit or sensory deficit. He exhibits normal muscle tone. Coordination normal.   Skin: Skin is warm and dry. No rash noted. He is not diaphoretic. No erythema.   Psychiatric: He has a normal mood and affect. His behavior is normal.   Nursing note and vitals reviewed.      Results Review:  I have personally reviewed most recent cardiac tracings, lab results and radiology images and interpretations and agree with findings, most notably: As above.    Results from last 7 days   Lab Units 03/01/20  1407 03/01/20  1351   WBC 10*3/mm3  --  6.47   HEMOGLOBIN g/dL  --  16.5   HEMATOCRIT %  --  52.0*   PLATELETS 10*3/mm3  --  233   INR  0.94  --      Results from last 7 days   Lab Units 03/01/20  1351   SODIUM mmol/L 139   POTASSIUM mmol/L 4.8   CHLORIDE mmol/L 107   CO2 mmol/L 19.8*   BUN mg/dL 21   CREATININE mg/dL 1.15   GLUCOSE mg/dL 94   CALCIUM mg/dL 9.5   ALT (SGPT) U/L 12   AST (SGOT) U/L 17   TROPONIN T ng/mL <0.010     Estimated Creatinine Clearance: 71.8 mL/min (by C-G formula based on SCr of 1.15 mg/dL).  Brief Urine Lab Results     None          Microbiology Results (last 10 days)     ** No results found for the last 240 hours. **          ECG/EMG Results (most recent)     Procedure Component Value Units Date/Time    ECG 12 Lead [[id number]] Collected:  03/01/20 1334     Updated:  03/01/20 8438     Narrative:       HEART RATE= 70  bpm  RR Interval= 860  ms  NH Interval= 154  ms  P Horizontal Axis= 22  deg  P Front Axis= 55  deg  QRSD Interval= 94  ms  QT Interval= 436  ms  QRS Axis= 17  deg  T Wave Axis= 117  deg  - ABNORMAL ECG -  Sinus rhythm  Abnrm T, probable ischemia, anterolateral lds  NO SIGNIFICANT CHANGE FROM PREVIOUS ECG  Electronically Signed By: Anika Henson ([facility]) 01-Mar-2020 17:43:38  Date and Time of Study: 2020-03-01 13:34:48    ECG 12 Lead [[id number]] Collected:  03/01/20 1922     Updated:  03/01/20 1925    Narrative:       HEART RATE= 75  bpm  RR Interval= 800  ms  NH Interval= 144  ms  P Horizontal Axis= 12  deg  P Front Axis= 52  deg  QRSD Interval= 95  ms  QT Interval= 403  ms  QRS Axis= 16  deg  T Wave Axis= 112  deg  - ABNORMAL ECG -  Sinus rhythm  Abnrm T, probable ischemia, anterolateral lds  Electronically Signed By:   Date and Time of Study: 2020-03-01 19:22:07               Results for orders placed during the hospital encounter of 02/06/20   Adult Transthoracic Echo Complete W/ Cont if Necessary Per Protocol    Narrative · Estimated EF appears to be in the range of 66 - 70%.  · Left ventricular wall thickness is consistent with mild concentric   hypertrophy.  · Left ventricular diastolic dysfunction is noted (grade I) consistent   with impaired relaxation.  · Normal right ventricular cavity size and systolic function noted.  · Left atrial cavity size is mildly dilated.  · There is no evidence of pericardial effusion.          Xr Chest 1 View    Result Date: 3/1/2020  No active disease. Signer Name: Niall Luna MD  Signed: 3/1/2020 2:35 PM  Workstation Name: [id number]  [facility]        Estimated Creatinine Clearance: 71.8 mL/min (by C-G formula based on SCr of 1.15 mg/dL).    Assessment/Plan   Assessment/Plan       Active Hospital Problems    Diagnosis  POA   • **Chest pain [R07.9]  Yes   • HTN (hypertension) [I10]  Unknown   • Tobacco  dependence [F17.200]  Unknown   • Dyslipidemia [E78.5]  Unknown      Resolved Hospital Problems   No resolved problems to display.       Suspected unstable angina with recurrent chest pain with previous history of STEMI 2/6/2020  Continue aspirin Plavix statinand calcium channel blocker and Imdur given some suspicion of coronary spasm last evaluation at [facility] after his STEMI event  Appreciate cardiology input    Hypertension  Not compliant with medication  Issue blood pressure was elevated  Now improving  Continue to monitor      Dyslipidemia  Continue statin    Tobacco dependence  Encouraged to stop smoking to avoid further events      Alcohol use.  Reports no previous alcohol withdrawal symptoms in the past  He had been using bottle of whiskey that lasts him over a week.  Add the thiamine and watch for alcohol toward symptoms      Suspected COPD  Needs work-up as an outpatient  Has chronic cough      Chronic diastolic dysfunction with normal ejection fraction last echo  No signs of CHF exacerbation this time      Poor compliance with medications  Patient advised to continue medications as prescribed      Neck pain.  Possibly related to referred pain from the chest.  Check carotid duplex  No neurologic symptoms or signs    Shoulder/hip girdle pain  Check sed rate  Check CPK    DVT prophylaxis with Lovenox subcutaneous      VTE Prophylaxis -   Mechanical Order History:     None      Pharmalogical Order History:     None          CODE STATUS:    Code Status and Medical Interventions:   Ordered at: 03/01/20 1522     Code Status:    CPR     Medical Interventions (Level of Support Prior to Arrest):    Full       Admission Status:  I believe this patient meets observation criteria.      I discussed the patient's findings and my recommendations with   Patient and family        Electronically signed by Wei Pop MD, 03/01/20, 7:06 PM.  [facility] hospitalist Team

## 2020-03-02 NOTE — SIGNIFICANT NOTE
03/02/20 0830   Provider Notification   Reason for Communication Critical lab value  (troponin)   Provider Name Dr. Martínez   Notification Route In person, on unit   Response No new orders

## 2020-03-02 NOTE — CONSULTS
Date of Hospital Visit: [unfilled]ENC@  Encounter Provider: Darcy Martínez MD  Place of Service: [facility] CARDIOLOGY  Patient Name: Sammy Roblero  :1955  Referral Provider: No ref. provider found    Chief complaint    Arm pain    History of Present Illness        He presented on 20 to [facility] with anterior STEMI.  Echocardiogram done 2020 showed ejection fraction 66-70%, mild left ventricular hypertrophy, grade 1 diastolic dysfunction, mild left atrial enlargement, otherwise normal echo.    Cardiac catheterization done 2020 showed normal left main, 10% proximal LAD stenosis, patent diagonals, 50 to 60% origin ramus stenosis and OCT showed no ruptured plaque, 10% diffuse mid circumflex stenosis, dominant RCA with 20 to 30% diffuse proximal stenosis and 30% origin PDA disease.  His ECG at [facility] showed ST elevation, and there was thought that this might be due to coronary vasospasm.    Labs on 3/1/2020 show first normal troponin, then 0.283, and now 0.165, glucose 125, otherwise normal CMP, HDL 32, triglycerides 423, normal CBC.  Blood pressure was 166/108.  ECG showed sinus rhythm with deep T wave inversions in the anterolateral leads.  This was no change from prior ECG done 2020.  ECG is morning is unchanged from prior.  Chest x-ray showed no active disease.    He stopped taking his medications 2 half weeks ago due to neck pain.  He is not sure if medication is doing that.  He is smoking about 2 packs of cigarettes a day and drinking of the whiskey weekly.  He said he could not afford NicoDerm so he was not using that.  Since being here, his chest pain is resolved with medication.    Past Medical History:   Diagnosis Date   • Arthritis    • COPD (chronic obstructive pulmonary disease) (CMS/[facility])    • Coronary artery disease    • Elevated cholesterol    • Hx of seasonal allergies    • Hypertension        Past Surgical History:   Procedure Laterality  Date   • AMPUTATION FINGER / THUMB Left    • CARDIAC CATHETERIZATION N/A 2/6/2020    Procedure: Left Heart Cath;  Surgeon: Darwin Chao MD;  Location: [facility] CATH INVASIVE LOCATION;  Service: Cardiovascular;  Laterality: N/A;   • CARDIAC CATHETERIZATION N/A 2/6/2020    Procedure: Left ventriculography;  Surgeon: Darwin Chao MD;  Location:  [facility] CATH INVASIVE LOCATION;  Service: Cardiovascular;  Laterality: N/A;   • CARDIAC CATHETERIZATION N/A 2/6/2020    Procedure: Optical Coherent Tomography;  Surgeon: Darwin Chao MD;  Location:  [facility] CATH INVASIVE LOCATION;  Service: Cardiovascular;  Laterality: N/A;   • CARDIAC CATHETERIZATION N/A 2/6/2020    Procedure: Coronary angiography;  Surgeon: Darwin Chao MD;  [facility];  Service: Cardiovascular;  Laterality: N/A;   • SINUS SURGERY         Medications Prior to Admission   Medication Sig Dispense Refill Last Dose   • aspirin 81 MG EC tablet Take 1 tablet by mouth Daily. 30 tablet 11 3/1/2020 at Unknown time   • atorvastatin (LIPITOR) 40 MG tablet Take 1 tablet by mouth Daily. 30 tablet 11 Past Week at Unknown time   • clopidogrel (PLAVIX) 75 MG tablet Take 1 tablet by mouth Daily. 30 tablet 11 Past Week at Unknown time   • dilTIAZem CD (CARDIZEM CD) 240 MG 24 hr capsule Take 1 capsule by mouth Daily. 30 capsule 11 Past Week at Unknown time   • isosorbide mononitrate (IMDUR) 30 MG 24 hr tablet Take 1 tablet by mouth Daily. 30 tablet 11 Past Week at Unknown time       Current Meds  Scheduled Meds:  aspirin 81 mg Oral Daily   atorvastatin 40 mg Oral Daily   clopidogrel 75 mg Oral Daily   dilTIAZem  mg Oral Q24H   enoxaparin 40 mg Subcutaneous Q24H   isosorbide mononitrate 30 mg Oral Q24H   sodium chloride 10 mL Intravenous Q12H   sodium chloride 10 mL Intravenous Q12H     Continuous Infusions:   PRN Meds:.•  acetaminophen **OR** acetaminophen **OR** acetaminophen  •  melatonin  •  nitroglycerin  •  ondansetron **OR**  "ondansetron  •  senna-docusate sodium  •  [COMPLETED] Insert peripheral IV **AND** sodium chloride  •  sodium chloride  •  sodium chloride  •  sodium chloride  •  sodium chloride    Allergies as of 03/01/2020   • (No Known Allergies)       Social History     Socioeconomic History   • Marital status: Significant Other     Spouse name: Not on file   • Number of children: Not on file   • Years of education: Not on file   • Highest education level: Not on file   Tobacco Use   • Smoking status: Current Every Day Smoker     Packs/day: 2.00     Types: Cigarettes   • Smokeless tobacco: Former User     Types: Chew   Substance and Sexual Activity   • Alcohol use: Yes     Alcohol/week: 3.0 standard drinks     Types: 3 Shots of liquor per week     Comment: once a week if that   • Drug use: Not Currently       History reviewed. No pertinent family history.    REVIEW OF SYSTEMS:   12 point ROS was performed and is negative except as outlined in HPI            Objective:   Temp:  [97.7 °F (36.5 °C)-98.5 °F (36.9 °C)] 97.7 °F (36.5 °C)  Heart Rate:  [63-75] 75  Resp:  [16-18] 16  BP: (105-166)/() 131/67  Body mass index is 24.03 kg/m².  Flowsheet Rows      First Filed Value   Admission Height  185.4 cm (73\") Documented at 03/01/2020 1332   Admission Weight  78 kg (171 lb 15.3 oz) Documented at 03/01/2020 1332        Vitals:    03/02/20 0604   BP: 131/67   Pulse: 75   Resp: 16   Temp: 97.7 °F (36.5 °C)   SpO2: 96%       General Appearance:    Alert, cooperative, in no acute distress   Head:    Normocephalic, without obvious abnormality, atraumatic   Eyes:            Lids and lashes normal, conjunctivae and sclerae normal, no   icterus, no pallor, corneas clear, PERRLA   Ears:    Ears appear intact with no abnormalities noted   Throat:   No oral lesions, no thrush, oral mucosa moist   Neck:   No adenopathy, supple, trachea midline, no thyromegaly, no   carotid bruit, no JVD   Back:     No kyphosis present, no scoliosis present, " no skin lesions, erythema or scars, no tenderness to percussion or palpation, range of motion normal   Lungs:     Clear to auscultation,respirations regular, even and unlabored    Heart:    Regular rhythm and normal rate, normal S1 and S2, no murmur, no gallop, no rub, no click   Chest Wall:    No abnormalities observed   Abdomen:     Normal bowel sounds, no masses, no organomegaly, soft, nontender, nondistended, no guarding, no rebound  tenderness   Extremities:   Moves all extremities well, no edema, no cyanosis, no redness   Pulses:   Pulses palpable and equal bilaterally. Normal radial, carotid, femoral, dorsalis pedis and posterior tibial pulses bilaterally.    Skin:  Psychiatric:   No bleeding, bruising or rash    Alert and oriented x 3, normal mood and affect                 Lab Review:    Results from last 7 days   Lab Units 03/02/20  0206 03/01/20  1351   SODIUM mmol/L 137 139   POTASSIUM mmol/L 4.2 4.8   CHLORIDE mmol/L 103 107   CO2 mmol/L 23.0 19.8*   BUN mg/dL 27* 21   CREATININE mg/dL 1.03 1.15   CALCIUM mg/dL 9.5 9.5   BILIRUBIN mg/dL  --  0.3   ALK PHOS U/L  --  86   ALT (SGPT) U/L  --  12   AST (SGOT) U/L  --  17   GLUCOSE mg/dL 125* 94     Results from last 7 days   Lab Units 03/02/20  0206 03/01/20  1944 03/01/20  1407 03/01/20  1351   CK TOTAL U/L  --   --  59  --    TROPONIN T ng/mL 0.165* 0.283*  --  <0.010     @[id number]@  Results from last 7 days   Lab Units 03/02/20  0206 03/01/20  1351   WBC 10*3/mm3 7.54 6.47   HEMOGLOBIN g/dL 14.7 16.5   HEMATOCRIT % 44.1 52.0*   PLATELETS 10*3/mm3 266 233     Results from last 7 days   Lab Units 03/02/20  0206 03/01/20  1407   INR  0.89* 0.94   APTT seconds  --  23.8*     Results from last 7 days   Lab Units 03/02/20  0206   MAGNESIUM mg/dL 2.1         I personally viewed and interpreted the patient's EKG/Telemetry data  )  Patient Active Problem List   Diagnosis   • STEMI involving oth coronary artery of anterior wall (CMS/HCC)   • Chest pain   • HTN  (hypertension)   • Tobacco dependence   • Dyslipidemia     Assessment and Plan:    1. Chest pain, NSTEMI, not taking meds and smoking which could be lethal with coronary vasospam.  Has BP room so increase dilt and imdur and have him take dilt in am and imdur at night.    2. Neck pain, may be due to atorvastatin, stop for now.   3. Alcohol use, advised cessation.   4. Tobacco use, advised cessation and will fill out quit now [place] form so he can gets cessation meds and counseling.   5. Medication noncompliance.     I think his pain is all related to his noncompliance with medications and continuation of tobacco.  Will make medication adjustments and check a limited echo.  If that is all stable, could go home later today or tomorrow morning.  We will also fax in the quit now [place] form.    Darcy Martínez MD  03/02/20  7:22 AM.  Time spent in reviewing chart, discussion and examination:

## 2020-03-02 NOTE — PLAN OF CARE
Problem: Patient Care Overview  Goal: Plan of Care Review  Outcome: Ongoing (interventions implemented as appropriate)  Flowsheets (Taken 3/2/2020 1816)  Progress: improving  Outcome Summary: Echo and CT scan completed today(awaiting results) denies chest pain,wants to go home.

## 2020-03-02 NOTE — PLAN OF CARE
Discharge Planning Assessment   Tricia Flores     Patient Name: Sammy Roblero  MRN: [id number]  Today's Date: 3/2/2020    Admit Date: 3/1/2020    Discharge Needs Assessment       Row Name 03/02/20 4034       Living Environment    Lives With  significant other    Name(s) of Who Lives With Patient  Kierra Franz - SERA    Current Living Arrangements  home/apartment/condo    Primary Care Provided by  self    Provides Primary Care For  no one    Family Caregiver if Needed  significant other    Family Caregiver Names  Kierra     Quality of Family Relationships  supportive    Able to Return to Prior Arrangements  yes       Resource/Environmental Concerns    Resource/Environmental Concerns  financial    Financial Concerns  medicine, unable to afford    Transportation Concerns  car, none       Transition Planning    Patient/Family Anticipates Transition to  home;home with family    Patient/Family Anticipated Services at Transition  none    Transportation Anticipated  family or friend will provide       Discharge Needs Assessment    Readmission Within the Last 30 Days  no previous admission in last 30 days    Concerns to be Addressed  no discharge needs identified    Equipment Currently Used at Home  none    Anticipated Changes Related to Illness  none    Equipment Needed After Discharge  none    Provided Post Acute Provider List?  N/A    N/A Provider List Comment  Pt plan to return home with no needs             Discharge Plan       Row Name 03/02/20 6948       Plan    Plan  d/c home     Patient/Family in Agreement with Plan  yes    Plan Comments  Into room to interview pt.  Facesheet verified.  Pt lives in a home with his s/o Kierra.  Pt is typically independent with ADL;s and uses no DME/HH/REhab services.  Pt had been taking his medications as ordered but then quit taking all medications because he stated they made his neck hurt.  Pt has not been taking any medications or seeing a PCP.   He does follow up with his  cardiologist.  Pt had no insurance but he has taken steps to reinstate his medicaid benefits.  Pt declined offer of help to obtain a PCP.  CM will follow up on costs of medications after testing is complete and scripts have been written.              Destination        Coordination has not been started for this encounter.          Durable Medical Equipment        Coordination has not been started for this encounter.          Dialysis/Infusion        Coordination has not been started for this encounter.          Home Medical Care        Coordination has not been started for this encounter.          Therapy        Coordination has not been started for this encounter.          Community Resources        Coordination has not been started for this encounter.              Demographic Summary       Row Name 03/02/20 1344       General Information    Admission Type  observation    Arrived From  home    Referral Source  admission list    Reason for Consult  discharge planning    Preferred Language  English     Used During This Interaction  no            Functional Status       Row Name 03/02/20 1345       Functional Status    Usual Activity Tolerance  excellent    Current Activity Tolerance  excellent       Functional Status, IADL    Medications  independent    Meal Preparation  independent    Housekeeping  independent    Laundry  independent    Shopping  independent       Mental Status    General Appearance WDL  WDL       Mental Status Summary    Recent Changes in Mental Status/Cognitive Functioning  no changes            Psychosocial    No documentation.         Abuse/Neglect    No documentation.         Legal    No documentation.         Substance Abuse    No documentation.         Patient Forms    No documentation.             Adebayo Booker RN

## 2020-03-02 NOTE — PLAN OF CARE
Problem: Patient Care Overview  Goal: Plan of Care Review  Outcome: Ongoing (interventions implemented as appropriate)  Flowsheets (Taken 3/2/2020 0407)  Progress: improving  Plan of Care Reviewed With: patient  Outcome Summary: .

## 2020-03-03 VITALS
WEIGHT: 172 LBS | BODY MASS INDEX: 24.08 KG/M2 | DIASTOLIC BLOOD PRESSURE: 85 MMHG | RESPIRATION RATE: 18 BRPM | OXYGEN SATURATION: 95 % | TEMPERATURE: 97.1 F | SYSTOLIC BLOOD PRESSURE: 129 MMHG | HEART RATE: 69 BPM | HEIGHT: 71 IN

## 2020-03-03 LAB
ANION GAP SERPL CALCULATED.3IONS-SCNC: 12.2 MMOL/L (ref 5–15)
BASOPHILS # BLD AUTO: 0.05 10*3/MM3 (ref 0–0.2)
BASOPHILS NFR BLD AUTO: 0.8 % (ref 0–1.5)
BUN BLD-MCNC: 21 MG/DL (ref 8–23)
BUN/CREAT SERPL: 20.4 (ref 7–25)
CALCIUM SPEC-SCNC: 9.1 MG/DL (ref 8.6–10.5)
CHLORIDE SERPL-SCNC: 107 MMOL/L (ref 98–107)
CO2 SERPL-SCNC: 20.8 MMOL/L (ref 22–29)
CREAT BLD-MCNC: 1.03 MG/DL (ref 0.76–1.27)
DEPRECATED RDW RBC AUTO: 49.1 FL (ref 37–54)
EOSINOPHIL # BLD AUTO: 0.14 10*3/MM3 (ref 0–0.4)
EOSINOPHIL NFR BLD AUTO: 2.3 % (ref 0.3–6.2)
ERYTHROCYTE [DISTWIDTH] IN BLOOD BY AUTOMATED COUNT: 13.9 % (ref 12.3–15.4)
GFR SERPL CREATININE-BSD FRML MDRD: 73 ML/MIN/1.73
GLUCOSE BLD-MCNC: 104 MG/DL (ref 65–99)
HCT VFR BLD AUTO: 43 % (ref 37.5–51)
HGB BLD-MCNC: 14.2 G/DL (ref 13–17.7)
IMM GRANULOCYTES # BLD AUTO: 0.02 10*3/MM3 (ref 0–0.05)
IMM GRANULOCYTES NFR BLD AUTO: 0.3 % (ref 0–0.5)
LYMPHOCYTES # BLD AUTO: 2.28 10*3/MM3 (ref 0.7–3.1)
LYMPHOCYTES NFR BLD AUTO: 38.1 % (ref 19.6–45.3)
MAGNESIUM SERPL-MCNC: 2.1 MG/DL (ref 1.6–2.4)
MCH RBC QN AUTO: 31.6 PG (ref 26.6–33)
MCHC RBC AUTO-ENTMCNC: 33 G/DL (ref 31.5–35.7)
MCV RBC AUTO: 95.6 FL (ref 79–97)
MONOCYTES # BLD AUTO: 0.38 10*3/MM3 (ref 0.1–0.9)
MONOCYTES NFR BLD AUTO: 6.3 % (ref 5–12)
NEUTROPHILS # BLD AUTO: 3.12 10*3/MM3 (ref 1.7–7)
NEUTROPHILS NFR BLD AUTO: 52.2 % (ref 42.7–76)
NRBC BLD AUTO-RTO: 0 /100 WBC (ref 0–0.2)
PLATELET # BLD AUTO: 244 10*3/MM3 (ref 140–450)
PMV BLD AUTO: 10.2 FL (ref 6–12)
POTASSIUM BLD-SCNC: 4.5 MMOL/L (ref 3.5–5.2)
RBC # BLD AUTO: 4.5 10*6/MM3 (ref 4.14–5.8)
SODIUM BLD-SCNC: 140 MMOL/L (ref 136–145)
TROPONIN T SERPL-MCNC: 0.06 NG/ML (ref 0–0.03)
WBC NRBC COR # BLD: 5.99 10*3/MM3 (ref 3.4–10.8)

## 2020-03-03 PROCEDURE — 85025 COMPLETE CBC W/AUTO DIFF WBC: CPT | Performed by: INTERNAL MEDICINE

## 2020-03-03 PROCEDURE — 99217 PR OBSERVATION CARE DISCHARGE MANAGEMENT: CPT | Performed by: INTERNAL MEDICINE

## 2020-03-03 PROCEDURE — 83735 ASSAY OF MAGNESIUM: CPT | Performed by: INTERNAL MEDICINE

## 2020-03-03 PROCEDURE — 80048 BASIC METABOLIC PNL TOTAL CA: CPT | Performed by: INTERNAL MEDICINE

## 2020-03-03 PROCEDURE — G0378 HOSPITAL OBSERVATION PER HR: HCPCS

## 2020-03-03 PROCEDURE — 84484 ASSAY OF TROPONIN QUANT: CPT | Performed by: INTERNAL MEDICINE

## 2020-03-03 RX ORDER — NICOTINE 21 MG/24HR
1 PATCH, TRANSDERMAL 24 HOURS TRANSDERMAL
Qty: 7 PATCH | Refills: 3 | Status: SHIPPED | OUTPATIENT
Start: 2020-03-04 | End: 2020-04-01

## 2020-03-03 RX ORDER — DILTIAZEM HYDROCHLORIDE 300 MG/1
300 CAPSULE, COATED, EXTENDED RELEASE ORAL EVERY MORNING
Qty: 30 CAPSULE | Refills: 0 | Status: SHIPPED | OUTPATIENT
Start: 2020-03-04 | End: 2020-04-03

## 2020-03-03 RX ADMIN — ACETAMINOPHEN 650 MG: 325 TABLET, FILM COATED ORAL at 06:00

## 2020-03-03 RX ADMIN — NICOTINE 1 PATCH: 14 PATCH TRANSDERMAL at 09:09

## 2020-03-03 RX ADMIN — DILTIAZEM HYDROCHLORIDE 300 MG: 120 CAPSULE, COATED, EXTENDED RELEASE ORAL at 06:01

## 2020-03-03 RX ADMIN — ASPIRIN 81 MG: 81 TABLET, COATED ORAL at 09:08

## 2020-03-03 RX ADMIN — CLOPIDOGREL BISULFATE 75 MG: 75 TABLET ORAL at 09:08

## 2020-03-03 RX ADMIN — SODIUM CHLORIDE, PRESERVATIVE FREE 10 ML: 5 INJECTION INTRAVENOUS at 09:08

## 2020-03-03 NOTE — PROGRESS NOTES
"SERVICE: [facility] HOSPITALIST    CHIEF COMPLAINT: Carotid stenosis    SUBJECTIVE:    Patient examined on rounds in the mid afternoon    Stopped taking medications he was discharged on from [facility] due to them causing pain in his posterior neck.    Continues to smoke 2 packs/day, and finds it very challenging in order to stop, although he understands that it is the cigarettes are his life.  Discussed that coronary artery disease was unlikely to be improved by patient using e-cigarettes, and this might actually lead to increase in nicotine intake which would be deleterious.    Patient requesting to go home, though work-up continued to be pending late into the evening.    Pt states no further chest pain while he has been admitted.    Review of systems was negative for fever, chills, abdominal pain, nausea vomiting or diarrhea    OBJECTIVE:    /75   Pulse 65   Temp 97.2 °F (36.2 °C) (Oral)   Resp 18   Ht 180.3 cm (71\")   Wt 78 kg (172 lb)   SpO2 95%   BMI 23.99 kg/m²     MEDS/LABS REVIEWED AND ORDERED      aspirin 81 mg Oral Daily   clopidogrel 75 mg Oral Daily   [START ON 3/3/2020] dilTIAZem  mg Oral QAM   enoxaparin 40 mg Subcutaneous Q24H   isosorbide mononitrate 60 mg Oral Nightly   nicotine 1 patch Transdermal Q24H   sodium chloride 10 mL Intravenous Q12H   sodium chloride 10 mL Intravenous Q12H       Physical Exam   Constitutional: He is oriented to person, place, and time. No distress.   Thin arms and legs with a wide barrel chest, appears older than biologic age   HENT:   Head: Normocephalic and atraumatic.   Eyes: Pupils are equal, round, and reactive to light. Conjunctivae and EOM are normal.   Cardiovascular: Normal rate, regular rhythm and normal heart sounds.   Pulmonary/Chest: Effort normal. No stridor. No respiratory distress. He has no rales.   Diminished in all fields   Abdominal: Soft. Bowel sounds are normal. He exhibits no distension. There is no " tenderness. There is no guarding.   Musculoskeletal: Normal range of motion. He exhibits no edema.   Neurological: He is alert and oriented to person, place, and time. No cranial nerve deficit.   Skin: Skin is warm and dry. He is not diaphoretic.   Diffuse skin symptoms of chronic sun exposure   Psychiatric: He has a normal mood and affect. His behavior is normal.   Nursing note and vitals reviewed.      LAB/DIAGNOSTICS:    Lab Results (last 24 hours)     Procedure Component Value Units Date/Time    Troponin [[id number]]  (Abnormal) Collected:  03/02/20 2003    Specimen:  Blood Updated:  03/02/20 2029     Troponin T 0.054 ng/mL     Narrative:       Troponin T Reference Range:  <= 0.03 ng/mL-   Negative for AMI  >0.03 ng/mL-     Abnormal for myocardial necrosis.  Clinicians would have to utilize clinical acumen, EKG, Troponin and serial changes to determine if it is an Acute Myocardial Infarction or myocardial injury due to an underlying chronic condition.       Results may be falsely decreased if patient taking Biotin.      Troponin [[id number]]  (Abnormal) Collected:  03/02/20 1414    Specimen:  Blood Updated:  03/02/20 1454     Troponin T 0.060 ng/mL     Narrative:       Troponin T Reference Range:  <= 0.03 ng/mL-   Negative for AMI  >0.03 ng/mL-     Abnormal for myocardial necrosis.  Clinicians would have to utilize clinical acumen, EKG, Troponin and serial changes to determine if it is an Acute Myocardial Infarction or myocardial injury due to an underlying chronic condition.       Results may be falsely decreased if patient taking Biotin.      Troponin [[id number]]  (Abnormal) Collected:  03/02/20 0822    Specimen:  Blood Updated:  03/02/20 0907     Troponin T 0.091 ng/mL     Narrative:       Troponin T Reference Range:  <= 0.03 ng/mL-   Negative for AMI  >0.03 ng/mL-     Abnormal for myocardial necrosis.  Clinicians would have to utilize clinical acumen, EKG, Troponin and serial changes to determine if it is an  Acute Myocardial Infarction or myocardial injury due to an underlying chronic condition.       Results may be falsely decreased if patient taking Biotin.      Troponin [[id number]]  (Abnormal) Collected:  03/02/20 0206    Specimen:  Blood Updated:  03/02/20 0237     Troponin T 0.165 ng/mL     Narrative:       Troponin T Reference Range:  <= 0.03 ng/mL-   Negative for AMI  >0.03 ng/mL-     Abnormal for myocardial necrosis.  Clinicians would have to utilize clinical acumen, EKG, Troponin and serial changes to determine if it is an Acute Myocardial Infarction or myocardial injury due to an underlying chronic condition.       Results may be falsely decreased if patient taking Biotin.      Sedimentation Rate [[id number]]  (Normal) Collected:  03/02/20 0206    Specimen:  Blood Updated:  03/02/20 0236     Sed Rate 9 mm/hr     Lipid Panel [[id number]]  (Abnormal) Collected:  03/02/20 0206    Specimen:  Blood Updated:  03/02/20 0235     Total Cholesterol 187 mg/dL      Triglycerides 423 mg/dL      HDL Cholesterol 32 mg/dL      LDL Cholesterol  --     Comment: Unable to calculate        VLDL Cholesterol --     Comment: Unable to calculate        LDL/HDL Ratio --     Comment: Unable to calculate       Narrative:       Cholesterol Reference Ranges  (U.S. Department of Health and Human Services ATP III Classifications)    Desirable          <200 mg/dL  Borderline High    200-239 mg/dL  High Risk          >240 mg/dL      Triglyceride Reference Ranges  (U.S. Department of Health and Human Services ATP III Classifications)    Normal           <150 mg/dL  Borderline High  150-199 mg/dL  High             200-499 mg/dL  Very High        >500 mg/dL    HDL Reference Ranges  (U.S. Department of Health and Human Services ATP III Classifcations)    Low     <40 mg/dl (major risk factor for CHD)  High    >60 mg/dl ('negative' risk factor for CHD)        LDL Reference Ranges  (U.S. Department of Health and Human Services ATP III  Classifcations)    Optimal          <100 mg/dL  Near Optimal     100-129 mg/dL  Borderline High  130-159 mg/dL  High             160-189 mg/dL  Very High        >189 mg/dL    Basic Metabolic Panel [[id number]]  (Abnormal) Collected:  03/02/20 0206    Specimen:  Blood Updated:  03/02/20 0231     Glucose 125 mg/dL      BUN 27 mg/dL      Creatinine 1.03 mg/dL      Sodium 137 mmol/L      Potassium 4.2 mmol/L      Chloride 103 mmol/L      CO2 23.0 mmol/L      Calcium 9.5 mg/dL      eGFR Non African Amer 73 mL/min/1.73      BUN/Creatinine Ratio 26.2     Anion Gap 11.0 mmol/L     Narrative:       GFR Normal >60  Chronic Kidney Disease <60  Kidney Failure <15      Magnesium [[id number]]  (Normal) Collected:  03/02/20 0206    Specimen:  Blood Updated:  03/02/20 0231     Magnesium 2.1 mg/dL     LDL Cholesterol, Direct [[id number]] Collected:  03/02/20 0206    Specimen:  Blood Updated:  03/02/20 0231    Protime-INR [[id number]]  (Abnormal) Collected:  03/02/20 0206    Specimen:  Blood Updated:  03/02/20 0228     Protime 11.7 Seconds      INR 0.89    Narrative:       Therapeutic Ranges for INR: 2.0-3.0 (PT 20-30)                              2.5-3.5 (PT 25-34)    CBC & Differential [[id number]] Collected:  03/02/20 0206    Specimen:  Blood Updated:  03/02/20 0226    Narrative:       The following orders were created for panel order CBC & Differential.  Procedure                               Abnormality         Status                     ---------                               -----------         ------                     CBC Auto Differential[[id number]]        Normal              Final result                 Please view results for these tests on the individual orders.    CBC Auto Differential [[id number]]  (Normal) Collected:  03/02/20 0206    Specimen:  Blood Updated:  03/02/20 0226     WBC 7.54 10*3/mm3      RBC 4.58 10*6/mm3      Hemoglobin 14.7 g/dL      Hematocrit 44.1 %      MCV 96.3 fL      MCH 32.1 pg      MCHC 33.3 g/dL       RDW 13.9 %      RDW-SD 49.4 fl      MPV 9.9 fL      Platelets 266 10*3/mm3      Neutrophil % 58.2 %      Lymphocyte % 32.2 %      Monocyte % 6.4 %      Eosinophil % 2.4 %      Basophil % 0.7 %      Immature Grans % 0.1 %      Neutrophils, Absolute 4.39 10*3/mm3      Lymphocytes, Absolute 2.43 10*3/mm3      Monocytes, Absolute 0.48 10*3/mm3      Eosinophils, Absolute 0.18 10*3/mm3      Basophils, Absolute 0.05 10*3/mm3      Immature Grans, Absolute 0.01 10*3/mm3         Results for orders placed during the hospital encounter of 03/01/20   Adult Transthoracic Echo Limited W/ Cont if Necessary Per Protocol    Narrative · Left ventricular systolic function is hyperdynamic (EF > 70%).   Calculated EF = 70.0%. Estimated EF was in agreement with the calculated   EF. Normal left ventricular cavity size noted. Left ventricular wall   thickness is consistent with hypertrophy. Left ventricular diastolic   function not assessed on this study. There is asymmetric apical   hypertrophy with increased trabeculation.        Ct Angiogram Head    Result Date: 3/2/2020  1. The left internal carotid artery is occluded at its origin due to partially calcified plaque and possibly thrombus. It reconstitutes at the level of the supraclinoid ICA. 2. There is about 20% diameter stenosis of the right carotid artery by NASCET criteria. 3. Both vertebral arteries are patent. Right is mildly dominant. 4. No intracranial vascular cut off. There is likely mild intracranial atherosclerotic disease involving at least the left M1 segment. Vascular variations discussed above. Signer Name: Lily Lizarraga MD  Signed: 3/2/2020 6:55 PM  Workstation Name: [facility]  [facility]    Ct Angiogram Neck    Result Date: 3/2/2020  1. The left internal carotid artery is occluded at its origin due to partially calcified plaque and possibly thrombus. It reconstitutes at the level of the supraclinoid ICA. 2. There is about 20% diameter  stenosis of the right carotid artery by NASCET criteria. 3. Both vertebral arteries are patent. Right is mildly dominant. 4. No intracranial vascular cut off. There is likely mild intracranial atherosclerotic disease involving at least the left M1 segment. Vascular variations discussed above. Signer Name: Lily Lizarraga MD  Signed: 3/2/2020 6:55 PM  Workstation Name: [facility]  [facility]    Xr Chest 1 View    Result Date: 3/1/2020  No active disease. Signer Name: Niall Luna MD  Signed: 3/1/2020 2:35 PM  Workstation Name: [email or web address]    Us Carotid Bilateral    Result Date: 3/2/2020  1. Large amount of plaque in both carotid systems. 2. Total occlusion of the left internal carotid artery at its origin, which is reportedly a known finding. 3. Approximate 50-69% diameter reduction involving the mid right internal carotid artery. However, grayscale images of this region suggests the degree of stenosis may be higher. Recommend correlation with CT angiography of the head and neck with contrast for further evaluation. 4. Elevated peak systolic velocity in the right common carotid artery and in the external carotid arteries bilaterally, suggesting degrees of stenosis bilaterally. 5. Antegrade blood flow in both vertebral arteries.  This report was finalized on 3/2/2020 2:01 PM by Dr. Jose R Alva MD.        ASSESSMENT/PLAN:    NSTEMI due to medication non-compliance  - Trop down trending, stop every 6 labs, check 1 more time in a.m.  -And by cards was to make medication adjustments and check limited echo discharge possibly this afternoon    Neck pain caused by cardiac meds  -Carotid Dopplers ordered by admitting hospitalist showed likely critical stenosis and radiologist recommend CTA head and neck  -CTA head and neck read still pending,?  Pathology here related to patient's intolerance of medications/neck pain on taking his cardiac meds?  -Initiate  cardiology's assistance and determining next steps in management based on CTA findings    Tobacco Abuse - provided >3 minutes of counseling.     Patient is new to this provider, all problems new

## 2020-03-03 NOTE — NURSING NOTE
Case Management Discharge Note      Final Note: d/c home     Provided Post Acute Provider List?: N/A  N/A Provider List Comment: Pt plan to return home with no needs          Final Discharge Disposition Code: 01 - home or self-care

## 2020-03-03 NOTE — PLAN OF CARE
Problem: Patient Care Overview  Goal: Plan of Care Review  Outcome: Ongoing (interventions implemented as appropriate)  Flowsheets (Taken 3/3/2020 9289)  Progress: improving  Plan of Care Reviewed With: patient  Outcome Summary: denies chest pain, continues on telemetry running sinus rhythm, [facility] signed off at this time

## 2020-03-03 NOTE — SIGNIFICANT NOTE
03/03/20 0521   Provider Notification   Reason for Communication Critical lab value  (troponin 0.059)   Provider Name Dr. Pop   Notification Route Phone call   Response No new orders

## 2020-03-03 NOTE — DISCHARGE INSTR - LAB
[facility] at on March 17, 2020 at 10:30a.m ([address] [facility] [place], [id number])

## 2020-03-03 NOTE — DISCHARGE SUMMARY
"Sammy Roblero  1955  [id number]      Hospitalists Discharge Summary    Date of Admission: 3/1/2020  Date of Discharge:  3/3/2020    Primary Discharge Diagnoses: NSTEMI due to medication non-compliance    Secondary Discharge Diagnoses:   Neck pain due to nitrates and carotid stenosis  Tobacco Abuse  ALcohol ABuse    PCP  Patient Care Team:  Provider, No Known as PCP - General    Consults:   Consults     Date and Time Order Name Status Description    3/1/2020 1940 Inpatient Cardiology Consult Completed     3/1/2020 1633 Inpatient Cardiology Consult            CC:  Arm Pain    History of Present Illness:  As per H&P: \"Mr. Roblero is a 64 y.o.  presents to [facility] complaining of chest pain     He has history of STEMI evaluated on 6 February at [facility] with anterior ST elevation MI.  He underwent cardiac cath that showed the following      CORONARY ANGIOGRAPHY:  Left main: Normal  Left anterior descending: Normal proximally 10% luminal irregularities in the mid and distal portion, diagonals are free of disease  Ramus intermedius: There is a 50 to 60% origin lesion.  OCT revealed that it was about a 50 to 60% lesion and was not a ruptured plaque.  Circumflex: Normal proximally 10% luminal irregularities in the midportion  RCA: Is a dominant vessel.  Diffuse 20 to 30% proximal lesion normal in the mid and distal portion some 30% origin PDA disease        Patient was told that he had coronary vasospasms.  He was started on medications that he felt related to his recent right neck pain and he stopped taking the medications for about 2 weeks and comes back with repeat episode of chest discomfort left side with bilateral achiness in the bilateral upper extremities.  He reports no weakness shortness of breath cough or phlegm except his usual cough with expectoration probably related to COPD and chronic tobacco use.  He denies any hemoptysis lower extremity swelling but he has muscle aches " "in his legs.  He still smokes and drinks about a bottle of whiskey that lasts over a week.     His previous echo shows diastolic dysfunction but with good systolic function.        Initial evaluation the ER heart today shows EKG changes that showed no significant change from previous EKGs.  Initial troponin was negative.  Dr. Ness was called from ER and apparently he is agreeable on keeping the patient in the hospital for further evaluation in the morning.  Patient reports that his pain is improved.  He has a nitro patch on his chest.  His A1c 5.8.  Creatinine is 1.1.\"    Hospital Course  Seen by cardiology, nitrates stopped, cartoid dopplars showed b/l stenosis, recommended to get opinion of vascular surgeron.    Physical Exam at Discharge  Vital Signs  Temp:  [97.1 °F (36.2 °C)-98.4 °F (36.9 °C)] 97.1 °F (36.2 °C)  Heart Rate:  [64-69] 69  Resp:  [17-18] 18  BP: (106-129)/(62-85) 129/85    Physical Exam:  Physical Exam   Constitutional: No distress.   Eyes: Pupils are equal, round, and reactive to light.   Cardiovascular: Normal rate, regular rhythm and normal heart sounds. Exam reveals no friction rub.   No murmur heard.  Pulmonary/Chest: Effort normal and breath sounds normal. No stridor. No respiratory distress. He has no wheezes. He has no rales.   Abdominal: Soft. Bowel sounds are normal. He exhibits no distension. There is no tenderness. There is no guarding.   Musculoskeletal: He exhibits no edema.   Neurological: He is alert.   Skin: He is not diaphoretic.       Operations and Procedures Performed:        Allergies:  has No Known Allergies.    Basilio  not reviewed    Discharge Medications:     Discharge Medications      New Medications      Instructions Start Date   nicotine 14 MG/24HR patch  Commonly known as:  NICODERM CQ   1 patch, Transdermal, Every 24 Hours Scheduled   Start Date:  March 4, 2020        Changes to Medications      Instructions Start Date   dilTIAZem  MG 24 hr capsule  Commonly " known as:  CARDIZEM CD  What changed:    · medication strength  · how much to take  · when to take this   300 mg, Oral, Every Morning   Start Date:  March 4, 2020        Continue These Medications      Instructions Start Date   aspirin 81 MG EC tablet   81 mg, Oral, Daily      atorvastatin 40 MG tablet  Commonly known as:  LIPITOR   40 mg, Oral, Daily      clopidogrel 75 MG tablet  Commonly known as:  PLAVIX   75 mg, Oral, Daily         Stop These Medications    isosorbide mononitrate 30 MG 24 hr tablet  Commonly known as:  IMDUR            Last Lab Results:   Lab Results (last 72 hours)     Procedure Component Value Units Date/Time    Troponin [[id number]]  (Abnormal) Collected:  03/03/20 0421    Specimen:  Blood Updated:  03/03/20 0516     Troponin T 0.059 ng/mL     Narrative:       Troponin T Reference Range:  <= 0.03 ng/mL-   Negative for AMI  >0.03 ng/mL-     Abnormal for myocardial necrosis.  Clinicians would have to utilize clinical acumen, EKG, Troponin and serial changes to determine if it is an Acute Myocardial Infarction or myocardial injury due to an underlying chronic condition.       Results may be falsely decreased if patient taking Biotin.      Basic Metabolic Panel [[id number]]  (Abnormal) Collected:  03/03/20 0421    Specimen:  Blood Updated:  03/03/20 0509     Glucose 104 mg/dL      BUN 21 mg/dL      Creatinine 1.03 mg/dL      Sodium 140 mmol/L      Potassium 4.5 mmol/L      Chloride 107 mmol/L      CO2 20.8 mmol/L      Calcium 9.1 mg/dL      eGFR Non African Amer 73 mL/min/1.73      BUN/Creatinine Ratio 20.4     Anion Gap 12.2 mmol/L     Narrative:       GFR Normal >60  Chronic Kidney Disease <60  Kidney Failure <15      Magnesium [[id number]]  (Normal) Collected:  03/03/20 0421    Specimen:  Blood Updated:  03/03/20 0509     Magnesium 2.1 mg/dL     CBC & Differential [[id number]] Collected:  03/03/20 0424    Specimen:  Blood Updated:  03/03/20 0433    Narrative:       The following orders were  created for panel order CBC & Differential.  Procedure                               Abnormality         Status                     ---------                               -----------         ------                     CBC Auto Differential[[id number]]        Normal              Final result                 Please view results for these tests on the individual orders.    CBC Auto Differential [[id number]]  (Normal) Collected:  03/03/20 0424    Specimen:  Blood Updated:  03/03/20 0433     WBC 5.99 10*3/mm3      RBC 4.50 10*6/mm3      Hemoglobin 14.2 g/dL      Hematocrit 43.0 %      MCV 95.6 fL      MCH 31.6 pg      MCHC 33.0 g/dL      RDW 13.9 %      RDW-SD 49.1 fl      MPV 10.2 fL      Platelets 244 10*3/mm3      Neutrophil % 52.2 %      Lymphocyte % 38.1 %      Monocyte % 6.3 %      Eosinophil % 2.3 %      Basophil % 0.8 %      Immature Grans % 0.3 %      Neutrophils, Absolute 3.12 10*3/mm3      Lymphocytes, Absolute 2.28 10*3/mm3      Monocytes, Absolute 0.38 10*3/mm3      Eosinophils, Absolute 0.14 10*3/mm3      Basophils, Absolute 0.05 10*3/mm3      Immature Grans, Absolute 0.02 10*3/mm3      nRBC 0.0 /100 WBC     LDL Cholesterol, Direct [[id number]]  (Abnormal) Collected:  03/02/20 0206    Specimen:  Blood Updated:  03/02/20 2119     LDL Cholesterol  108 mg/dL     Narrative:       LDL Reference Ranges    (U.S. Department of Health and Human Services ATP III Classifications)    Optimal          <100 mg/dl  Near Optimal     100-129 mg/dl  Borderline High  130-159 mg/dl  High             160-189 mg/dl  Very High        >189 mg/dl      Troponin [[id number]]  (Abnormal) Collected:  03/02/20 2003    Specimen:  Blood Updated:  03/02/20 2029     Troponin T 0.054 ng/mL     Narrative:       Troponin T Reference Range:  <= 0.03 ng/mL-   Negative for AMI  >0.03 ng/mL-     Abnormal for myocardial necrosis.  Clinicians would have to utilize clinical acumen, EKG, Troponin and serial changes to determine if it is an Acute  Myocardial Infarction or myocardial injury due to an underlying chronic condition.       Results may be falsely decreased if patient taking Biotin.      Troponin [[id number]]  (Abnormal) Collected:  03/02/20 1414    Specimen:  Blood Updated:  03/02/20 1454     Troponin T 0.060 ng/mL     Narrative:       Troponin T Reference Range:  <= 0.03 ng/mL-   Negative for AMI  >0.03 ng/mL-     Abnormal for myocardial necrosis.  Clinicians would have to utilize clinical acumen, EKG, Troponin and serial changes to determine if it is an Acute Myocardial Infarction or myocardial injury due to an underlying chronic condition.       Results may be falsely decreased if patient taking Biotin.      Troponin [[id number]]  (Abnormal) Collected:  03/02/20 0822    Specimen:  Blood Updated:  03/02/20 0907     Troponin T 0.091 ng/mL     Narrative:       Troponin T Reference Range:  <= 0.03 ng/mL-   Negative for AMI  >0.03 ng/mL-     Abnormal for myocardial necrosis.  Clinicians would have to utilize clinical acumen, EKG, Troponin and serial changes to determine if it is an Acute Myocardial Infarction or myocardial injury due to an underlying chronic condition.       Results may be falsely decreased if patient taking Biotin.      Troponin [[id number]]  (Abnormal) Collected:  03/02/20 0206    Specimen:  Blood Updated:  03/02/20 0237     Troponin T 0.165 ng/mL     Narrative:       Troponin T Reference Range:  <= 0.03 ng/mL-   Negative for AMI  >0.03 ng/mL-     Abnormal for myocardial necrosis.  Clinicians would have to utilize clinical acumen, EKG, Troponin and serial changes to determine if it is an Acute Myocardial Infarction or myocardial injury due to an underlying chronic condition.       Results may be falsely decreased if patient taking Biotin.      Sedimentation Rate [[id number]]  (Normal) Collected:  03/02/20 0206    Specimen:  Blood Updated:  03/02/20 0236     Sed Rate 9 mm/hr     Lipid Panel [[id number]]  (Abnormal) Collected:  03/02/20  0206    Specimen:  Blood Updated:  03/02/20 0235     Total Cholesterol 187 mg/dL      Triglycerides 423 mg/dL      HDL Cholesterol 32 mg/dL      LDL Cholesterol  --     Comment: Unable to calculate        VLDL Cholesterol --     Comment: Unable to calculate        LDL/HDL Ratio --     Comment: Unable to calculate       Narrative:       Cholesterol Reference Ranges  (U.S. Department of Health and Human Services ATP III Classifications)    Desirable          <200 mg/dL  Borderline High    200-239 mg/dL  High Risk          >240 mg/dL      Triglyceride Reference Ranges  (U.S. Department of Health and Human Services ATP III Classifications)    Normal           <150 mg/dL  Borderline High  150-199 mg/dL  High             200-499 mg/dL  Very High        >500 mg/dL    HDL Reference Ranges  (U.S. Department of Health and Human Services ATP III Classifcations)    Low     <40 mg/dl (major risk factor for CHD)  High    >60 mg/dl ('negative' risk factor for CHD)        LDL Reference Ranges  (U.S. Department of Health and Human Services ATP III Classifcations)    Optimal          <100 mg/dL  Near Optimal     100-129 mg/dL  Borderline High  130-159 mg/dL  High             160-189 mg/dL  Very High        >189 mg/dL    Basic Metabolic Panel [[id number]]  (Abnormal) Collected:  03/02/20 0206    Specimen:  Blood Updated:  03/02/20 0231     Glucose 125 mg/dL      BUN 27 mg/dL      Creatinine 1.03 mg/dL      Sodium 137 mmol/L      Potassium 4.2 mmol/L      Chloride 103 mmol/L      CO2 23.0 mmol/L      Calcium 9.5 mg/dL      eGFR Non African Amer 73 mL/min/1.73      BUN/Creatinine Ratio 26.2     Anion Gap 11.0 mmol/L     Narrative:       GFR Normal >60  Chronic Kidney Disease <60  Kidney Failure <15      Magnesium [[id number]]  (Normal) Collected:  03/02/20 0206    Specimen:  Blood Updated:  03/02/20 0231     Magnesium 2.1 mg/dL     Protime-INR [[id number]]  (Abnormal) Collected:  03/02/20 0206    Specimen:  Blood Updated:  03/02/20 0228      Protime 11.7 Seconds      INR 0.89    Narrative:       Therapeutic Ranges for INR: 2.0-3.0 (PT 20-30)                              2.5-3.5 (PT 25-34)    CBC & Differential [[id number]] Collected:  03/02/20 0206    Specimen:  Blood Updated:  03/02/20 0226    Narrative:       The following orders were created for panel order CBC & Differential.  Procedure                               Abnormality         Status                     ---------                               -----------         ------                     CBC Auto Differential[[id number]]        Normal              Final result                 Please view results for these tests on the individual orders.    CBC Auto Differential [[id number]]  (Normal) Collected:  03/02/20 0206    Specimen:  Blood Updated:  03/02/20 0226     WBC 7.54 10*3/mm3      RBC 4.58 10*6/mm3      Hemoglobin 14.7 g/dL      Hematocrit 44.1 %      MCV 96.3 fL      MCH 32.1 pg      MCHC 33.3 g/dL      RDW 13.9 %      RDW-SD 49.4 fl      MPV 9.9 fL      Platelets 266 10*3/mm3      Neutrophil % 58.2 %      Lymphocyte % 32.2 %      Monocyte % 6.4 %      Eosinophil % 2.4 %      Basophil % 0.7 %      Immature Grans % 0.1 %      Neutrophils, Absolute 4.39 10*3/mm3      Lymphocytes, Absolute 2.43 10*3/mm3      Monocytes, Absolute 0.48 10*3/mm3      Eosinophils, Absolute 0.18 10*3/mm3      Basophils, Absolute 0.05 10*3/mm3      Immature Grans, Absolute 0.01 10*3/mm3     Troponin [[id number]]  (Abnormal) Collected:  03/01/20 1944    Specimen:  Blood Updated:  03/01/20 2046     Troponin T 0.283 ng/mL     Narrative:       Troponin T Reference Range:  <= 0.03 ng/mL-   Negative for AMI  >0.03 ng/mL-     Abnormal for myocardial necrosis.  Clinicians would have to utilize clinical acumen, EKG, Troponin and serial changes to determine if it is an Acute Myocardial Infarction or myocardial injury due to an underlying chronic condition.       Results may be falsely decreased if patient taking Biotin.      CK  [[id number]]  (Normal) Collected:  03/01/20 1407    Specimen:  Blood Updated:  03/01/20 1953     Creatine Kinase 59 U/L     TSH [[id number]]  (Normal) Collected:  03/01/20 1351    Specimen:  Blood Updated:  03/01/20 1704     TSH 0.584 uIU/mL     Hemoglobin A1c [[id number]]  (Abnormal) Collected:  03/01/20 1351    Specimen:  Blood Updated:  03/01/20 1658     Hemoglobin A1C 5.80 %     Narrative:       Hemoglobin A1C Ranges:    Increased Risk for Diabetes  5.7% to 6.4%  Diabetes                     >= 6.5%  Diabetic Goal                < 7.0%    Magnesium [[id number]]  (Normal) Collected:  03/01/20 1351    Specimen:  Blood Updated:  03/01/20 1648     Magnesium 2.1 mg/dL     Protime-INR [[id number]]  (Normal) Collected:  03/01/20 1407    Specimen:  Blood Updated:  03/01/20 1422     Protime 12.3 Seconds      INR 0.94    Narrative:       Therapeutic Ranges for INR: 2.0-3.0 (PT 20-30)                              2.5-3.5 (PT 25-34)    aPTT [[id number]]  (Abnormal) Collected:  03/01/20 1407    Specimen:  Blood Updated:  03/01/20 1422     PTT 23.8 seconds     Narrative:       PTT = The equivalent PTT values for the therapeutic range of heparin levels at 0.1 to 0.7 U/ml are 53 to 110 seconds.      Troponin [[id number]]  (Normal) Collected:  03/01/20 1351    Specimen:  Blood Updated:  03/01/20 1416     Troponin T <0.010 ng/mL     Narrative:       Troponin T Reference Range:  <= 0.03 ng/mL-   Negative for AMI  >0.03 ng/mL-     Abnormal for myocardial necrosis.  Clinicians would have to utilize clinical acumen, EKG, Troponin and serial changes to determine if it is an Acute Myocardial Infarction or myocardial injury due to an underlying chronic condition.       Results may be falsely decreased if patient taking Biotin.      Comprehensive Metabolic Panel [[id number]]  (Abnormal) Collected:  03/01/20 1351    Specimen:  Blood Updated:  03/01/20 1413     Glucose 94 mg/dL      BUN 21 mg/dL      Creatinine 1.15 mg/dL      Sodium 139 mmol/L       Potassium 4.8 mmol/L      Chloride 107 mmol/L      CO2 19.8 mmol/L      Calcium 9.5 mg/dL      Total Protein 7.3 g/dL      Albumin 3.90 g/dL      ALT (SGPT) 12 U/L      AST (SGOT) 17 U/L      Alkaline Phosphatase 86 U/L      Total Bilirubin 0.3 mg/dL      eGFR Non African Amer 64 mL/min/1.73      Globulin 3.4 gm/dL      A/G Ratio 1.1 g/dL      BUN/Creatinine Ratio 18.3     Anion Gap 12.2 mmol/L     Narrative:       GFR Normal >60  Chronic Kidney Disease <60  Kidney Failure <15      CBC & Differential [[id number]] Collected:  03/01/20 1351    Specimen:  Blood Updated:  03/01/20 1359    Narrative:       The following orders were created for panel order CBC & Differential.  Procedure                               Abnormality         Status                     ---------                               -----------         ------                     CBC Auto Differential[[id number]]        Abnormal            Final result                 Please view results for these tests on the individual orders.    CBC Auto Differential [[id number]]  (Abnormal) Collected:  03/01/20 1351    Specimen:  Blood Updated:  03/01/20 1359     WBC 6.47 10*3/mm3      RBC 5.33 10*6/mm3      Hemoglobin 16.5 g/dL      Hematocrit 52.0 %      MCV 97.6 fL      MCH 31.0 pg      MCHC 31.7 g/dL      RDW 13.9 %      RDW-SD 50.0 fl      MPV 10.1 fL      Platelets 233 10*3/mm3      Neutrophil % 59.7 %      Lymphocyte % 29.5 %      Monocyte % 7.1 %      Eosinophil % 2.3 %      Basophil % 0.8 %      Immature Grans % 0.6 %      Neutrophils, Absolute 3.86 10*3/mm3      Lymphocytes, Absolute 1.91 10*3/mm3      Monocytes, Absolute 0.46 10*3/mm3      Eosinophils, Absolute 0.15 10*3/mm3      Basophils, Absolute 0.05 10*3/mm3      Immature Grans, Absolute 0.04 10*3/mm3      nRBC 0.0 /100 WBC         Ct Angiogram Head    Result Date: 3/2/2020  Narrative: INDICATION: Known complete occlusion left internal carotid artery. Current smoker. Benign essential hypertension.  Pain in right side of neck for 10 days. Evaluate carotid stenosis. Bruit. ICU patient. TECHNIQUE: CT angiogram of the head and neck with contrast. 3-D reformatted images were acquired. Evaluation for a significant carotid arterial stenosis is based on the NASCET criteria. Radiation dose reduction techniques included automated exposure control or exposure modulation based on body size. Radiation audit for number of CT and nuclear cardiology exams performed in the last year:  0. Study performed during intravenous contrast administration. Dose recorded in the patient's chart. COMPARISON: None available. FINDINGS: CTA neck:  There are vascular calcifications at the aortic arch and involving the origins of the great vessels. There is likely mild stenosis at the origin left subclavian and right common carotid artery. There is mild stenosis at the origin of the innominate. Partially calcified plaque at the origin of the right internal carotid artery. By NASCET criteria there is about 20% diameter stenosis. There is mild calcified disease of the right carotid siphon. There is occlusion of the left internal carotid artery at its origin by partially calcified atherosclerotic plaque and presumably thrombus or soft plaque. This is a known finding by history. Right vertebral artery is patent throughout. The left vertebral artery is patent throughout. The right vertebral artery is mildly dominant. CTA head:  There is an anterior communicating artery present with 3 A2 type vessels. There is reconstitution of the left internal carotid artery within the siphon level of the supraclinoid ICA. There is a moderate-sized left posterior communicator present. The left A1 vessel is mildly hypoplastic. There is some irregularity of the left ilium one vessel which is probably a manifestation of intracranial atherosclerotic disease. The vertebrobasilar system is patent. No intracranial vascular cut off is appreciated. No high-grade central  stenosis is suspected intracranially. No intracranial aneurysm is suspected allowing for limitations of CT angiography for assessment for aneurysm at the level the skull base. There is likely a tiny right posterior communicator. The dural venous sinuses are grossly patent. There are emphysematous changes at lung apices. There is mucosal disease in the left inferior frontal sinus. Mild mucosal thickening in the ethmoid air cells and postoperative changes are noted paranasal sinuses.     Impression: 1. The left internal carotid artery is occluded at its origin due to partially calcified plaque and possibly thrombus. It reconstitutes at the level of the supraclinoid ICA. 2. There is about 20% diameter stenosis of the right carotid artery by NASCET criteria. 3. Both vertebral arteries are patent. Right is mildly dominant. 4. No intracranial vascular cut off. There is likely mild intracranial atherosclerotic disease involving at least the left M1 segment. Vascular variations discussed above. Signer Name: Lily Lizarraga MD  Signed: 3/2/2020 6:55 PM  Workstation Name: [email or web address]  [facility]    Ct Angiogram Neck    Result Date: 3/2/2020  Narrative: INDICATION: Known complete occlusion left internal carotid artery. Current smoker. Benign essential hypertension. Pain in right side of neck for 10 days. Evaluate carotid stenosis. Bruit. ICU patient. TECHNIQUE: CT angiogram of the head and neck with contrast. 3-D reformatted images were acquired. Evaluation for a significant carotid arterial stenosis is based on the NASCET criteria. Radiation dose reduction techniques included automated exposure control or exposure modulation based on body size. Radiation audit for number of CT and nuclear cardiology exams performed in the last year:  0. Study performed during intravenous contrast administration. Dose recorded in the patient's chart. COMPARISON: None available. FINDINGS: CTA neck:  There are vascular  calcifications at the aortic arch and involving the origins of the great vessels. There is likely mild stenosis at the origin left subclavian and right common carotid artery. There is mild stenosis at the origin of the innominate. Partially calcified plaque at the origin of the right internal carotid artery. By NASCET criteria there is about 20% diameter stenosis. There is mild calcified disease of the right carotid siphon. There is occlusion of the left internal carotid artery at its origin by partially calcified atherosclerotic plaque and presumably thrombus or soft plaque. This is a known finding by history. Right vertebral artery is patent throughout. The left vertebral artery is patent throughout. The right vertebral artery is mildly dominant. CTA head:  There is an anterior communicating artery present with 3 A2 type vessels. There is reconstitution of the left internal carotid artery within the siphon level of the supraclinoid ICA. There is a moderate-sized left posterior communicator present. The left A1 vessel is mildly hypoplastic. There is some irregularity of the left ilium one vessel which is probably a manifestation of intracranial atherosclerotic disease. The vertebrobasilar system is patent. No intracranial vascular cut off is appreciated. No high-grade central stenosis is suspected intracranially. No intracranial aneurysm is suspected allowing for limitations of CT angiography for assessment for aneurysm at the level the skull base. There is likely a tiny right posterior communicator. The dural venous sinuses are grossly patent. There are emphysematous changes at lung apices. There is mucosal disease in the left inferior frontal sinus. Mild mucosal thickening in the ethmoid air cells and postoperative changes are noted paranasal sinuses.     Impression: 1. The left internal carotid artery is occluded at its origin due to partially calcified plaque and possibly thrombus. It reconstitutes at the level  of the supraclinoid ICA. 2. There is about 20% diameter stenosis of the right carotid artery by NASCET criteria. 3. Both vertebral arteries are patent. Right is mildly dominant. 4. No intracranial vascular cut off. There is likely mild intracranial atherosclerotic disease involving at least the left M1 segment. Vascular variations discussed above. Signer Name: Lily Lizarraga MD  Signed: 3/2/2020 6:55 PM  Workstation Name: [id number]  [facility]    Xr Chest 1 View    Result Date: 3/1/2020  Narrative: CHEST X-RAY, 3/1/2020  HISTORY:  64-year-old male in the ED complaining of 2 day history of chest pain, shortness of air in left arm pain.  TECHNIQUE: AP portable upright chest x-ray.  FINDINGS: Heart size and pulmonary vascularity are within normal limits. The lungs are expanded and clear. No visible pulmonary edema, focal infiltrate or pleural effusion.     Impression: No active disease. Signer Name: Niall Luna MD  Signed: 3/1/2020 2:35 PM  Workstation Name: [id number]  [facility]    Us Carotid Bilateral    Result Date: 3/2/2020  Narrative: CAROTID DOPPLER ULTRASOUND EXAMINATION, 03/02/2020:  HISTORY: Known complete occlusion left internal carotid artery. Current smoker. Benign essential hypertension. Pain right side of neck for 2 days. Dyslipidemia. Evaluate for carotid stenosis.  TECHNIQUE: Carotid ultrasound examination was performed using gray scale, spectral Doppler and color flow Doppler ultrasound imaging. Carotid flow was assessed using criteria based on NASCET methodology.  FINDINGS: Ultrasound examination of the carotid arteries demonstrates large amount of plaque bilaterally.  Examination is abnormal, demonstrating complete occlusion of the left internal carotid artery at its origin, which is reportedly a known finding. Peak velocity in the left common and external carotid arteries was 83 cm/s and 133 cm/s respectively, suggesting a degree of stenosis  in the left external carotid artery.  There is elevated peak systolic velocity in the midportion of the right internal carotid artery of 173 cm/s characteristic of approximate 50-69% diameter reduction. However, I'm grayscale images, the degree of stenosis in this region appears somewhat higher. For more accurate diagnosis, recommend correlation with CT angiography of the head and neck with contrast for further evaluation. The findings were called to the patient's nurse at 2:00 PM on 03/02/2020. There is elevated peak systolic velocity in the right common and external carotid arteries of 126 cm/s and 214 cm/s respectively, suggesting degrees of stenosis. Antegrade blood flow is seen in both vertebral arteries.      Impression: 1. Large amount of plaque in both carotid systems. 2. Total occlusion of the left internal carotid artery at its origin, which is reportedly a known finding. 3. Approximate 50-69% diameter reduction involving the mid right internal carotid artery. However, grayscale images of this region suggests the degree of stenosis may be higher. Recommend correlation with CT angiography of the head and neck with contrast for further evaluation. 4. Elevated peak systolic velocity in the right common carotid artery and in the external carotid arteries bilaterally, suggesting degrees of stenosis bilaterally. 5. Antegrade blood flow in both vertebral arteries.  This report was finalized on 3/2/2020 2:01 PM by Dr. Jose R Alva MD.        Condition on Discharge:  Good    Discharge Disposition  To Home    Visiting Nurse:    No     Home PT/OT:  No     Home Safety Evaluation:  No     DME  None    Discharge Diet:      Dietary Orders (From admission, onward)     Start     Ordered    03/02/20 0939  Diet Regular; Cardiac  Diet Effective Now     Question Answer Comment   Diet Texture / Consistency Regular    Common Modifiers Cardiac        03/02/20 0939                Activity at Discharge:  activity as  tolerated      Pre-discharge education      Follow-up Appointments  Future Appointments   Date Time Provider Department Center   3/18/2020  2:20 PM Darwin Chao MD [facility] None     Additional Instructions for the Follow-ups that You Need to Schedule     Discharge Follow-up with PCP   As directed       Currently Documented PCP:    Provider, No Known    PCP Phone Number:    [phone]     Follow Up Details:  2 weeks         Discharge Follow-up with Specified Provider: Vascular Surgery; 3 Weeks   As directed      To:  Vascular Surgery    Follow Up:  3 Weeks               Test Results Pending at Discharge       Albania Daley MD  03/03/20  12:25 PM    Time: Discharge < 30 min (if over 30 minutes give explanation as to why it took greater than 30 minutes)

## 2020-03-03 NOTE — SIGNIFICANT NOTE
03/02/20 2029   Provider Notification   Reason for Communication Critical lab value  (troponin 0.054)   Provider Name Hospitalists   Notification Route Other (Comment)   Response Other (Comment)  (trending down as expected)

## 2020-03-04 ENCOUNTER — READMISSION MANAGEMENT (OUTPATIENT)
Dept: CALL CENTER | Facility: HOSPITAL | Age: 65
End: 2020-03-04

## 2020-03-04 NOTE — OUTREACH NOTE
Prep Survey      Responses   [facility] facility patient discharged from?  [place]   Is LACE score < 7 ?  Yes   Eligibility  Readm Mgmt   Discharge diagnosis  chest pain, HTN   Does the patient have one of the following disease processes/diagnoses(primary or secondary)?  Other   Does the patient have Home health ordered?  No   Is there a DME ordered?  No   Prep survey completed?  Yes          Paula Nelson RN

## 2020-03-05 ENCOUNTER — READMISSION MANAGEMENT (OUTPATIENT)
Dept: CALL CENTER | Facility: HOSPITAL | Age: 65
End: 2020-03-05

## 2020-03-05 NOTE — OUTREACH NOTE
LAG < 7 Survey      Responses   [facility] [place] patient discharged from?  [place]   Does the patient have one of the following disease processes/diagnoses(primary or secondary)?  Other   Is there a successful TCM telephone encounter documented?  No   BHLAG <7 Attempt successful?  Yes   Call start time  1723   Call end time  1733   Discharge diagnosis  chest pain, HTN   Is patient permission given to speak with other caregiver?  No   Meds reviewed with patient/caregiver?  Yes   Is the patient having any side effects they believe may be caused by any medication additions or changes?  No   Does the patient have all medications ordered at discharge?  Yes   Is the patient taking all medications as directed (includes completed medication regime)?  Yes   Does the patient have a primary care provider?   Yes   Does the patient have an appointment with their PCP within 7 days of discharge?  Yes   Comments regarding PCP  [facility] at on March 17, 2020 at 10:30a.m   Has the patient kept scheduled appointments due by today?  N/A   Comments  Cardiology appt 3/17/20   Has home health visited the patient within 72 hours of discharge?  N/A   Psychosocial issues?  No   Did the patient receive a copy of their discharge instructions?  Yes   Nursing interventions  Reviewed instructions with patient   What is the patient's perception of their health status since discharge?  Improving   Is the patient/caregiver able to teach back signs and symptoms related to disease process for when to call PCP?  Yes   Is the patient/caregiver able to teach back signs and symptoms related to disease process for when to call 911?  Yes   Is the patient/caregiver able to teach back the hierarchy of who to call/visit for symptoms/problems? PCP, Specialist, Home health nurse, Urgent Care, ED, 911  Yes   If the patient is a current smoker, are they able to teach back resources for cessation?  -- [Patient did not  the nicotine patches. He states that  he is trying to cut down on his smoking. ]   Graduated  Yes          Tessie Shane RN

## 2020-03-09 ENCOUNTER — TELEPHONE (OUTPATIENT)
Dept: CARDIOLOGY | Facility: CLINIC | Age: 65
End: 2020-03-09

## 2020-03-17 ENCOUNTER — OFFICE VISIT (OUTPATIENT)
Dept: CARDIOLOGY | Facility: CLINIC | Age: 65
End: 2020-03-17

## 2020-03-17 VITALS
SYSTOLIC BLOOD PRESSURE: 140 MMHG | HEART RATE: 63 BPM | OXYGEN SATURATION: 95 % | RESPIRATION RATE: 16 BRPM | HEIGHT: 71 IN | DIASTOLIC BLOOD PRESSURE: 90 MMHG | WEIGHT: 179 LBS | BODY MASS INDEX: 25.06 KG/M2

## 2020-03-17 DIAGNOSIS — F17.200 TOBACCO DEPENDENCE: ICD-10-CM

## 2020-03-17 DIAGNOSIS — E78.5 DYSLIPIDEMIA: ICD-10-CM

## 2020-03-17 DIAGNOSIS — I21.09 STEMI INVOLVING OTH CORONARY ARTERY OF ANTERIOR WALL (HCC): ICD-10-CM

## 2020-03-17 DIAGNOSIS — I10 ESSENTIAL HYPERTENSION: Primary | ICD-10-CM

## 2020-03-17 PROCEDURE — 93000 ELECTROCARDIOGRAM COMPLETE: CPT | Performed by: NURSE PRACTITIONER

## 2020-03-17 PROCEDURE — 99214 OFFICE O/P EST MOD 30 MIN: CPT | Performed by: NURSE PRACTITIONER

## 2020-03-17 NOTE — PROGRESS NOTES
Date of Office Visit: 2020  Encounter Provider: CASS Farooq  Place of Service: [facility] CARDIOLOGY  Patient Name: Sammy Roblero  :1955  Primary Cardiologist: Dr. Martínez    CC:  2 week hospital follow up        HPI: Sammy Roblero is a pleasant 64 y.o. male who presents 2020 for cardiac follow up.     64-year-old gentleman looks about 15 years older than his stated age.  Longstanding tobacco abuse does not go to the physician otherwise. He presented 2020  with stuttering chest pain off and on since the day prior. [facility] EKG showed anterior ST elevation in V1 and 2 in 1 and aVL.  He was subsequently transferred to [facility] to the Cath Lab. He had no history of PND, orthopnea. Edema, bleeding and no history of stroke.  He stated he  had a history of blockage, maybe in his left carotid. He denied significant history of lung or kidney problems.    FINDINGS: 2010     LEFT VENTRICULOGRAPHY: The LV pressure was 140/10.  There was mid anterior to apical akinesis inferoapical akinesis EF is reduced and about 30% the proximal portion of the ventricle is very hyperdynamic.  There was no mitral insufficiency or gradient across the aortic valve on pullback.     CORONARY ANGIOGRAPHY:  Left main: Normal  Left anterior descending: Normal proximally 10% luminal irregularities in the mid and distal portion, diagonals are free of disease  Ramus intermedius: There is a 50 to 60% origin lesion.  OCT revealed that it was about a 50 to 60% lesion and was not a ruptured plaque.  Circumflex: Normal proximally 10% luminal irregularities in the midportion  RCA: Is a dominant vessel.  Diffuse 20 to 30% proximal lesion normal in the mid and distal portion some 30% origin PDA disease     SUMMARY: This is a pretty confusing case he clearly has ST elevation in an odd pattern I do not think this is a [place] cardiomyopathy as he is a bail and the apex is out.  It  does not appear that the ramus is causing all of this.  I think this likely could be vasospasm     RECOMMENDATIONS: We will do routine MI care but I am in a started on treatment for vasospasm have encouraged him to stop smoking he does not seem to have great insight into that     ECG shows impressive ST-T changes overnight consistent with evolution of his ischemic event. He was strongly encouraged  to stop smoking, but it appeared there was little chance of that acutally happening. He was started on a calcium channel blocker and a long-acting nitrate as well as ADP receptor blocker and a statin.  Beta-blockers are actually contraindicated in this situation and his LV function is still preserved. It was not felt he needed an ARB at that point. An echo was performed that showed:     Interpretation Summary 2/6/2020    · Estimated EF appears to be in the range of 66 - 70%.  · Left ventricular wall thickness is consistent with mild concentric hypertrophy.  · Left ventricular diastolic dysfunction is noted (grade I) consistent with impaired relaxation.  · Normal right ventricular cavity size and systolic function noted.  · Left atrial cavity size is mildly dilated.  · There is no evidence of pericardial effusion.     He was discharged home on 2/8/2020.    He then presented to [facility] on 3/1/2020 with chest pain that felt like his prior admission. Labs on 3/1/2020 show first normal troponin, then 0.283, and now 0.165, glucose 125, otherwise normal CMP, HDL 32, triglycerides 423, normal CBC.  Blood pressure was 166/108.  ECG showed sinus rhythm with deep T wave inversions in the anterolateral leads.  This was no change from prior ECG done 2/8/2020.  ECG the following morning was  unchanged from prior.  Chest x-ray showed no active disease.     He stopped taking his medications 2 1/2 weeks ago due to neck pain.  He is not sure if medication is doing that.  He is smoking about 2 packs of cigarettes a day and drinking  " whiskey weekly.  He said he could not afford NicoDerm so he was not using that.  Since being admitted,  his chest pain is resolved with medication.  It was felt that his pain was related to noncompliance with his medication and continuation of tobacco.  Medication adjustments were made.  He had a limited echo on 3/2/2020 that showed:        Interpretation Summary     · Left ventricular systolic function is hyperdynamic (EF > 70%). Calculated EF = 70.0%. Estimated EF was in agreement with the calculated EF. Normal left ventricular cavity size noted. Left ventricular wall thickness is consistent with hypertrophy. Left ventricular diastolic function not assessed on this study. There is asymmetric apical hypertrophy with increased trabeculation.        A bilateral carotid US was completed that revealed:  IMPRESSION:  1. Large amount of plaque in both carotid systems.  2. Total occlusion of the left internal carotid artery at its origin,  which is reportedly a known finding.  3. Approximate 50-69% diameter reduction involving the mid right  internal carotid artery. However, grayscale images of this region  suggests the degree of stenosis may be higher. Recommend correlation  with CT angiography of the head and neck with contrast for further  evaluation.  4. Elevated peak systolic velocity in the right common carotid artery  and in the external carotid arteries bilaterally, suggesting degrees of  stenosis bilaterally.  5. Antegrade blood flow in both vertebral arteries.     He was referred to vascular surgery as outpatient.  He was discharged on 3/3/2020.    He denies any palpitations, shortness of breath, lower extremity edema.  He has had no episodes of chest pain or chest tightness.  He denies any dizziness or lightheadedness.  He denies any fatigue.  He states he is gone from smoking to packs a day to approximately a half a pack a day with some \"vaping\" in between times.  He denies any leg pain, numbness or tingling " his upper or lower extremities.  He denies any PND, cough, orthopnea.  He denies any bleeding.  He has no idea what medicines he is taking and he simply brought his pills with him in his hand.  I have called [facility] pharmacy and verified the medications that he is on.  He states he has not heard from vascular and had no idea he was supposed to follow-up with them.  He also states that he knows he has blockages in his legs but could not tell me the last time he was treated for that.  He states he does have calf pain when he ambulates.  He states that the back of his neck continues to hurt and is stiff.    Past Medical History:   Diagnosis Date   • Arthritis    • COPD (chronic obstructive pulmonary disease) (CMS/[facility]ly [facility])    • Coronary artery disease    • Elevated cholesterol    • Hx of seasonal allergies    • Hypertension        Past Surgical History:   Procedure Laterality Date   • AMPUTATION FINGER / THUMB Left    • CARDIAC CATHETERIZATION N/A 2/6/2020    Procedure: Left Heart Cath;  Surgeon: Darwin Chao MD;  Location: [facility] INVASIVE LOCATION;  Service: Cardiovascular;  Laterality: N/A;   • CARDIAC CATHETERIZATION N/A 2/6/2020    Procedure: Left ventriculography;  Surgeon: Darwin Chao MD;  Location: [facility] INVASIVE LOCATION;  Service: Cardiovascular;  Laterality: N/A;   • CARDIAC CATHETERIZATION N/A 2/6/2020    Procedure: Optical Coherent Tomography;  Surgeon: Darwin Chao MD;  Location: [facility] INVASIVE LOCATION;  Service: Cardiovascular;  Laterality: N/A;   • CARDIAC CATHETERIZATION N/A 2/6/2020    Procedure: Coronary angiography;  Surgeon: Darwin Chao MD;  Location: [facility] INVASIVE LOCATION;  Service: Cardiovascular;  Laterality: N/A;   • SINUS SURGERY         Social History     Socioeconomic History   • Marital status: Significant Other     Spouse name: Not on file   • Number of children: Not on file   • Years of education: Not on file   • Highest education level: Not on file    Tobacco Use   • Smoking status: Current Every Day Smoker     Packs/day: 2.00     Types: Cigarettes   • Smokeless tobacco: Former User     Types: Chew   • Tobacco comment: daily caffiene   Substance and Sexual Activity   • Alcohol use: Yes     Alcohol/week: 3.0 standard drinks     Types: 3 Shots of liquor per week     Comment: once a week if that   • Drug use: Not Currently       History reviewed. No pertinent family history.    The following portion of the patient's history were reviewed and updated as appropriate: past medical history, past surgical history, past social history, past family history, allergies, current medications, and problem list.    Review of Systems   Constitution: Negative for diaphoresis, fever and malaise/fatigue.   HENT: Negative for congestion, hearing loss, hoarse voice, nosebleeds and sore throat.    Eyes: Negative for photophobia, vision loss in left eye, vision loss in right eye and visual disturbance.   Cardiovascular: Negative for chest pain, dyspnea on exertion, irregular heartbeat, leg swelling, near-syncope, orthopnea, palpitations, paroxysmal nocturnal dyspnea and syncope.   Respiratory: Negative for cough, hemoptysis, shortness of breath, sleep disturbances due to breathing, snoring, sputum production and wheezing.    Endocrine: Negative for cold intolerance, heat intolerance, polydipsia, polyphagia and polyuria.   Hematologic/Lymphatic: Negative for bleeding problem. Does not bruise/bleed easily.   Skin: Negative for color change, dry skin, poor wound healing, rash and suspicious lesions.   Musculoskeletal: Negative for arthritis, back pain, falls, gout, joint pain, joint swelling, muscle cramps, muscle weakness and myalgias.        Leg and calf pain with ambulation   Gastrointestinal: Negative for bloating, abdominal pain, constipation, diarrhea, dysphagia, melena, nausea and vomiting.   Neurological: Negative for excessive daytime sleepiness, dizziness, headaches,  "light-headedness, loss of balance, numbness, paresthesias, seizures, vertigo and weakness.   Psychiatric/Behavioral: Negative for depression, memory loss and substance abuse. The patient is not nervous/anxious.        No Known Allergies      Current Outpatient Medications:   •  aspirin 81 MG EC tablet, Take 1 tablet by mouth Daily., Disp: 30 tablet, Rfl: 11  •  atorvastatin (LIPITOR) 40 MG tablet, Take 1 tablet by mouth Daily., Disp: 30 tablet, Rfl: 11  •  clopidogrel (PLAVIX) 75 MG tablet, Take 1 tablet by mouth Daily., Disp: 30 tablet, Rfl: 11  •  dilTIAZem CD (CARDIZEM CD) 300 MG 24 hr capsule, Take 1 capsule by mouth Every Morning for 30 days., Disp: 30 capsule, Rfl: 0  •  nicotine (NICODERM CQ) 14 MG/24HR patch, Place 1 patch on the skin as directed by provider Daily for 28 days., Disp: 7 patch, Rfl: 3        Objective:     Vitals:    03/17/20 1134   BP: 140/90   BP Location: Left arm   Patient Position: Sitting   Cuff Size: Adult   Pulse: 63   Resp: 16   SpO2: 95%   Weight: 81.2 kg (179 lb)   Height: 180.3 cm (71\")     Body mass index is 24.97 kg/m².      Physical Exam   Constitutional: He is oriented to person, place, and time. He appears well-developed and well-nourished. No distress.   HENT:   Head: Normocephalic and atraumatic.   Right Ear: External ear normal.   Left Ear: External ear normal.   Nose: Nose normal.   Eyes: Pupils are equal, round, and reactive to light. Conjunctivae are normal. Right eye exhibits no discharge. Left eye exhibits no discharge.   Neck: Normal range of motion. Neck supple. No JVD present. No tracheal deviation present. No thyromegaly present.   Cardiovascular: Normal rate, regular rhythm, normal heart sounds and intact distal pulses. Exam reveals no gallop and no friction rub.   No murmur heard.  Pulmonary/Chest: Effort normal and breath sounds normal. No respiratory distress. He has no wheezes. He has no rales. He exhibits no tenderness.   Reeks of cigarette smoke   Abdominal: " Soft. Bowel sounds are normal. He exhibits no distension. There is no tenderness.   Musculoskeletal: Normal range of motion. He exhibits no edema, tenderness or deformity.   Lymphadenopathy:     He has no cervical adenopathy.   Neurological: He is alert and oriented to person, place, and time. Coordination normal.   Skin: Skin is warm and dry. No rash noted. No erythema.   Psychiatric: He has a normal mood and affect. His behavior is normal. Judgment and thought content normal.             ECG 12 Lead  Date/Time: 3/17/2020 11:38 AM  Performed by: Gayle Simpson APRN  Authorized by: Gayle Simpson APRN   Comparison: compared with previous ECG from 3/2/2020  Similar to previous ECG  Rhythm: sinus rhythm  Rate: normal  Conduction: conduction normal  ST Depression: I and II  T inversion: I, aVL and V2  T flattening: V1, V3, V5 and V4  QRS axis: left    Clinical impression: abnormal EKG              Assessment:       Diagnosis Plan   1. Essential hypertension     2. STEMI involving oth coronary artery of anterior wall (CMS/HCC)     3. Dyslipidemia     4. Tobacco dependence            Plan:       1. Essential HTN - controlled    2. STEMI - cath 2/6/2020 with nonocclusive disease.  He is on DAPT and statin therapy.  Denies angina    3.  Dyslipidemia - continue on lipid lowering therapy.  He is currently on atorvastatin.    4. Tobacco dependence - reeks of smoke.  States he is down to smoking 1/2 pack a day with some vaping in between times.  I have encouraged him to continue to work on stopped all smoking and vaping.    RTO in 4 months with JK    As always, it has been a pleasure to participate in your patient's care. Thank you.       Sincerely,       CASS Farooq      Current Outpatient Medications:   •  aspirin 81 MG EC tablet, Take 1 tablet by mouth Daily., Disp: 30 tablet, Rfl: 11  •  atorvastatin (LIPITOR) 40 MG tablet, Take 1 tablet by mouth Daily., Disp: 30 tablet, Rfl: 11  •  clopidogrel (PLAVIX) 75 MG tablet,  Take 1 tablet by mouth Daily., Disp: 30 tablet, Rfl: 11  •  dilTIAZem CD (CARDIZEM CD) 300 MG 24 hr capsule, Take 1 capsule by mouth Every Morning for 30 days., Disp: 30 capsule, Rfl: 0  •  nicotine (NICODERM CQ) 14 MG/24HR patch, Place 1 patch on the skin as directed by provider Daily for 28 days., Disp: 7 patch, Rfl: 3    Dictated utilizing Dragon dictation

## 2020-04-28 RX ORDER — DILTIAZEM HYDROCHLORIDE 300 MG/1
300 CAPSULE, COATED, EXTENDED RELEASE ORAL DAILY
Qty: 90 CAPSULE | Refills: 0 | Status: SHIPPED | OUTPATIENT
Start: 2020-04-28 | End: 2020-07-06

## 2020-07-06 RX ORDER — DILTIAZEM HYDROCHLORIDE 300 MG/1
CAPSULE, EXTENDED RELEASE ORAL
Qty: 90 CAPSULE | Refills: 0 | Status: SHIPPED | OUTPATIENT
Start: 2020-07-06 | End: 2020-11-02

## 2020-10-01 NOTE — PROGRESS NOTES
Date of Office Visit: 10/02/2020  Encounter Provider: CASS Farooq  Place of Service: [facility] CARDIOLOGY  Patient Name: Sammy Roblero  :1955  Primary Cardiologist: Dr. Martínez    CC:  4 month follow up    Dear Dr. De Jesus    HPI: Sammy Roblero is a pleasant 65 y.o. male who presents 10/02/2020 for cardiac follow up.     64-year-old gentleman looks about 15 years older than his stated age.  Longstanding tobacco abuse does not go to the physician otherwise. He presented 2020  with stuttering chest pain off and on since the day prior. [facility] EKG showed anterior ST elevation in V1 and 2 in 1 and aVL.  He was subsequently transferred to [facility] to the Cath Lab. He had no history of PND, orthopnea. Edema, bleeding and no history of stroke.  He stated he  had a history of blockage, maybe in his left carotid. He denied significant history of lung or kidney problems.     FINDINGS: 2010     LEFT VENTRICULOGRAPHY: The LV pressure was 140/10.  There was mid anterior to apical akinesis inferoapical akinesis EF is reduced and about 30% the proximal portion of the ventricle is very hyperdynamic.  There was no mitral insufficiency or gradient across the aortic valve on pullback.     CORONARY ANGIOGRAPHY:  Left main: Normal  Left anterior descending: Normal proximally 10% luminal irregularities in the mid and distal portion, diagonals are free of disease  Ramus intermedius: There is a 50 to 60% origin lesion.  OCT revealed that it was about a 50 to 60% lesion and was not a ruptured plaque.  Circumflex: Normal proximally 10% luminal irregularities in the midportion  RCA: Is a dominant vessel.  Diffuse 20 to 30% proximal lesion normal in the mid and distal portion some 30% origin PDA disease     SUMMARY: This is a pretty confusing case he clearly has ST elevation in an odd pattern I do not think this is a [place] cardiomyopathy as he is a bail and the apex is  out.  It does not appear that the ramus is causing all of this.  I think this likely could be vasospasm     RECOMMENDATIONS: We will do routine MI care but I am in a started on treatment for vasospasm have encouraged him to stop smoking he does not seem to have great insight into that     ECG shows impressive ST-T changes overnight consistent with evolution of his ischemic event. He was strongly encouraged  to stop smoking, but it appeared there was little chance of that acutally happening. He was started on a calcium channel blocker and a long-acting nitrate as well as ADP receptor blocker and a statin.  Beta-blockers are actually contraindicated in this situation and his LV function is still preserved. It was not felt he needed an ARB at that point. An echo was performed that showed:      Interpretation Summary 2/6/2020     · Estimated EF appears to be in the range of 66 - 70%.  · Left ventricular wall thickness is consistent with mild concentric hypertrophy.  · Left ventricular diastolic dysfunction is noted (grade I) consistent with impaired relaxation.  · Normal right ventricular cavity size and systolic function noted.  · Left atrial cavity size is mildly dilated.  · There is no evidence of pericardial effusion.      He was discharged home on 2/8/2020.     He then presented to [facility] on [facility]/1/2020 with chest pain that felt like his prior admission. Labs on 3/1/2020 show first normal troponin, then 0.283, and now 0.165, glucose 125, otherwise normal CMP, HDL 32, triglycerides 423, normal CBC.  Blood pressure was 166/108.  ECG showed sinus rhythm with deep T wave inversions in the anterolateral leads.  This was no change from prior ECG done 2/8/2020.  ECG the following morning was  unchanged from prior.  Chest x-ray showed no active disease.     He stopped taking his medications 2 1/2 weeks ago due to neck pain.  He is not sure if medication is doing that.  He is smoking about 2 packs of cigarettes a day  "and drinking  whiskey weekly.  He said he could not afford NicoDerm so he was not using that.  Since being admitted,  his chest pain resolved with medication.  It was felt that his pain was related to noncompliance with his medication and continuation of tobacco.  Medication adjustments were made.  He had a limited echo on 3/2/2020 that showed:         Interpretation Summary      · Left ventricular systolic function is hyperdynamic (EF > 70%). Calculated EF = 70.0%. Estimated EF was in agreement with the calculated EF. Normal left ventricular cavity size noted. Left ventricular wall thickness is consistent with hypertrophy. Left ventricular diastolic function not assessed on this study. There is asymmetric apical hypertrophy with increased trabeculation.         A bilateral carotid US was completed that revealed:  IMPRESSION:  1. Large amount of plaque in both carotid systems.  2. Total occlusion of the left internal carotid artery at its origin,  which is reportedly a known finding.  3. Approximate 50-69% diameter reduction involving the mid right  internal carotid artery. However, grayscale images of this region  suggests the degree of stenosis may be higher. Recommend correlation  with CT angiography of the head and neck with contrast for further  evaluation.  4. Elevated peak systolic velocity in the right common carotid artery  and in the external carotid arteries bilaterally, suggesting degrees of  stenosis bilaterally.  5. Antegrade blood flow in both vertebral arteries.     He was referred to vascular surgery as outpatient.  He was discharged on 3/3/2020.     He states overall he has been doing \"okay I guess\".  He states he is taking his medications as directed.  He is now smoking 3 packs of cigarettes a day.  He states he tried the patch that seem to help with any could not afford it.  He states \"I will just keep smoking\".  He denies any palpitations, he cannot feel his heart racing or skipping.  He denies " "any shortness of air, lower extremity edema, chest pain or chest pressure.  He states occasionally he will have some dizziness that seems to happen with quick positional changes.  He states it does not happen very often.  He states after he walks for a while his legs just get heavy and \"I cannot pick them up\".  He states they hurt and then he stated they did not really hurt they just felt heavy.  He denies any snoring, PND, cough, orthopnea.  He denies any unexplained bleeding, dark or tarry stools.  His blood pressure is well controlled.         Past Medical History:   Diagnosis Date   • Arthritis    • COPD (chronic obstructive pulmonary disease) (CMS/[facility])    • Coronary artery disease    • Elevated cholesterol    • Hx of seasonal allergies    • Hypertension        Past Surgical History:   Procedure Laterality Date   • AMPUTATION FINGER / THUMB Left    • CARDIAC CATHETERIZATION N/A 2/6/2020    Procedure: Left Heart Cath;  Surgeon: Darwin Chao MD;  Location: [facility] INVASIVE LOCATION;  Service: Cardiovascular;  Laterality: N/A;   • CARDIAC CATHETERIZATION N/A 2/6/2020    Procedure: Left ventriculography;  Surgeon: Darwin Chao MD;  Location: [facility] CATH INVASIVE LOCATION;  Service: Cardiovascular;  Laterality: N/A;   • CARDIAC CATHETERIZATION N/A 2/6/2020    Procedure: Optical Coherent Tomography;  Surgeon: Darwin Chao MD;  Location: [facility] CATH INVASIVE LOCATION;  Service: Cardiovascular;  Laterality: N/A;   • CARDIAC CATHETERIZATION N/A 2/6/2020    Procedure: Coronary angiography;  Surgeon: Darwin Chao MD;  Location: [facility] CATH INVASIVE LOCATION;  Service: Cardiovascular;  Laterality: N/A;   • SINUS SURGERY         Social History     Socioeconomic History   • Marital status: Significant Other     Spouse name: Not on file   • Number of children: Not on file   • Years of education: Not on file   • Highest education level: Not on file   Tobacco Use   • Smoking status: Current Every Day Smoker "     Packs/day: 2.00     Types: Cigarettes   • Smokeless tobacco: Former User     Types: Chew   • Tobacco comment: daily caffiene   Substance and Sexual Activity   • Alcohol use: Yes     Alcohol/week: 3.0 standard drinks     Types: 3 Shots of liquor per week     Comment: once a week if that   • Drug use: Not Currently       No family history on file.    The following portion of the patient's history were reviewed and updated as appropriate: past medical history, past surgical history, past social history, past family history, allergies, current medications, and problem list.    Review of Systems   Constitution: Positive for malaise/fatigue. Negative for diaphoresis and fever.   HENT: Negative for congestion, hearing loss, hoarse voice, nosebleeds and sore throat.    Eyes: Negative for photophobia, vision loss in left eye, vision loss in right eye and visual disturbance.   Cardiovascular: Negative for chest pain, dyspnea on exertion, irregular heartbeat, leg swelling, near-syncope, orthopnea, palpitations, paroxysmal nocturnal dyspnea and syncope.   Respiratory: Negative for cough, hemoptysis, shortness of breath, sleep disturbances due to breathing, snoring, sputum production and wheezing.    Endocrine: Negative for cold intolerance, heat intolerance, polydipsia, polyphagia and polyuria.   Hematologic/Lymphatic: Negative for bleeding problem. Does not bruise/bleed easily.   Skin: Negative for color change, dry skin, poor wound healing, rash and suspicious lesions.   Musculoskeletal: Negative for arthritis, back pain, falls, gout, joint pain, joint swelling, muscle cramps, muscle weakness and myalgias.        Legs feel heavy   Gastrointestinal: Negative for bloating, abdominal pain, constipation, diarrhea, dysphagia, melena, nausea and vomiting.   Neurological: Positive for dizziness (with quick positional changes). Negative for excessive daytime sleepiness, headaches, light-headedness, loss of balance, numbness,  "paresthesias, seizures, vertigo and weakness.   Psychiatric/Behavioral: Negative for depression, memory loss and substance abuse. The patient is not nervous/anxious.        No Known Allergies      Current Outpatient Medications:   •  aspirin 81 MG EC tablet, Take 1 tablet by mouth Daily., Disp: 30 tablet, Rfl: 11  •  atorvastatin (LIPITOR) 40 MG tablet, Take 1 tablet by mouth Daily., Disp: 30 tablet, Rfl: 11  •  CARTIA  MG 24 hr capsule, TAKE ONE CAPSULE BY MOUTH DAILY, Disp: 90 capsule, Rfl: 0  •  clopidogrel (PLAVIX) 75 MG tablet, Take 1 tablet by mouth Daily., Disp: 30 tablet, Rfl: 11        Objective:     Vitals:    10/02/20 0918   BP: 110/80   Pulse: 62   Resp: 16   SpO2: 97%   Weight: 83.9 kg (185 lb)   Height: 180.3 cm (71\")     Body mass index is 25.8 kg/m².      Vitals signs reviewed.   Constitutional:       General: Not in acute distress.     Appearance: Normal weight and not in distress.   Eyes:      General:         Right eye: No discharge.         Left eye: No discharge.      Conjunctiva/sclera: Conjunctivae normal.   HENT:      Head: Normocephalic and atraumatic.      Right Ear: External ear normal.      Left Ear: External ear normal.      Nose: Nose normal.   Neck:      Musculoskeletal: Normal range of motion and neck supple.      Thyroid: No thyromegaly.      Vascular: No JVD.      Trachea: No tracheal deviation.      Lymphadenopathy: No cervical adenopathy.   Pulmonary:      Effort: Pulmonary effort is normal. No respiratory distress.      Breath sounds: Normal breath sounds. No wheezing. No rales.   Chest:      Chest wall: Not tender to palpatation.   Cardiovascular:      Normal rate. Regular rhythm.      No gallop.   Pulses:     Intact distal pulses.   Edema:     Peripheral edema absent.   Abdominal:      General: There is no distension.      Palpations: Abdomen is soft.      Tenderness: There is no abdominal tenderness.   Musculoskeletal: Normal range of motion.         General: No " "tenderness or deformity.   Skin:     General: Skin is warm and dry.      Findings: No erythema or rash.   Neurological:      Mental Status: Alert and oriented to person, place, and time.      Coordination: Coordination normal.   Psychiatric:         Behavior: Behavior normal.         Thought Content: Thought content normal.         Judgment: Judgment normal.               ECG 12 Lead    Date/Time: 10/2/2020 9:41 AM  Performed by: Gayle Simpson APRN  Authorized by: Gayle Simpson APRN   Comparison: compared with previous ECG from 3/17/2020  Similar to previous ECG  Rhythm: sinus rhythm  Rate: normal  Conduction: conduction normal  ST Segments: ST segments normal  T flattening: III  QRS axis: normal    Clinical impression: non-specific ECG              Assessment:       Diagnosis Plan   1. STEMI involving oth coronary artery of anterior wall (CMS/HCC)  Comprehensive Metabolic Panel    Magnesium   2. Essential hypertension  Comprehensive Metabolic Panel    Magnesium   3. Dyslipidemia     4. Tobacco dependence            Plan:       1. Essential HTN -      2. STEMI - cath 2/6/2020 with nonocclusive disease.  He is on DAPT and statin therapy.  Denies angina     3.  Dyslipidemia - Continue lipid lowering therapy, currently on atorvastatin.     4. Tobacco dependence - reeks of smoke.  Now smoking 3 packs a day.  States he could not afford the patch so \"I guess I will just keep smoking.\"    5.  Leg heaviness - check CMP and magnesium.  He did not want to have these drawn today, will come back next week.    Check Labs    RTO in 6 months with RM       As always, it has been a pleasure to participate in your patient's care. Thank you.       Sincerely,       CASS Farooq      Current Outpatient Medications:   •  aspirin 81 MG EC tablet, Take 1 tablet by mouth Daily., Disp: 30 tablet, Rfl: 11  •  atorvastatin (LIPITOR) 40 MG tablet, Take 1 tablet by mouth Daily., Disp: 30 tablet, Rfl: 11  •  CARTIA  MG 24 hr capsule, " TAKE ONE CAPSULE BY MOUTH DAILY, Disp: 90 capsule, Rfl: 0  •  clopidogrel (PLAVIX) 75 MG tablet, Take 1 tablet by mouth Daily., Disp: 30 tablet, Rfl: 11    Dictated utilizing Dragon dictation

## 2020-10-02 ENCOUNTER — OFFICE VISIT (OUTPATIENT)
Dept: CARDIOLOGY | Facility: CLINIC | Age: 65
End: 2020-10-02

## 2020-10-02 VITALS
RESPIRATION RATE: 16 BRPM | SYSTOLIC BLOOD PRESSURE: 110 MMHG | OXYGEN SATURATION: 97 % | WEIGHT: 185 LBS | BODY MASS INDEX: 25.9 KG/M2 | HEIGHT: 71 IN | DIASTOLIC BLOOD PRESSURE: 80 MMHG | HEART RATE: 62 BPM

## 2020-10-02 DIAGNOSIS — I10 ESSENTIAL HYPERTENSION: ICD-10-CM

## 2020-10-02 DIAGNOSIS — E78.5 DYSLIPIDEMIA: ICD-10-CM

## 2020-10-02 DIAGNOSIS — F17.200 TOBACCO DEPENDENCE: ICD-10-CM

## 2020-10-02 DIAGNOSIS — I21.09 STEMI INVOLVING OTH CORONARY ARTERY OF ANTERIOR WALL (HCC): ICD-10-CM

## 2020-10-02 PROBLEM — I25.118 CORONARY ARTERY DISEASE OF NATIVE ARTERY OF NATIVE HEART WITH STABLE ANGINA PECTORIS: Status: ACTIVE | Noted: 2020-10-02

## 2020-10-02 PROCEDURE — 93000 ELECTROCARDIOGRAM COMPLETE: CPT | Performed by: NURSE PRACTITIONER

## 2020-10-02 PROCEDURE — 99214 OFFICE O/P EST MOD 30 MIN: CPT | Performed by: NURSE PRACTITIONER

## 2020-10-05 ENCOUNTER — LAB (OUTPATIENT)
Dept: LAB | Facility: HOSPITAL | Age: 65
End: 2020-10-05

## 2020-10-05 DIAGNOSIS — I21.09 STEMI INVOLVING OTH CORONARY ARTERY OF ANTERIOR WALL (HCC): ICD-10-CM

## 2020-10-05 DIAGNOSIS — I10 ESSENTIAL HYPERTENSION: ICD-10-CM

## 2020-10-05 PROCEDURE — 36415 COLL VENOUS BLD VENIPUNCTURE: CPT

## 2020-10-05 PROCEDURE — 83735 ASSAY OF MAGNESIUM: CPT

## 2020-10-05 PROCEDURE — 80053 COMPREHEN METABOLIC PANEL: CPT

## 2020-10-06 LAB
ALBUMIN SERPL-MCNC: 4.1 G/DL (ref 3.5–5.2)
ALBUMIN/GLOB SERPL: 1.6 G/DL
ALP SERPL-CCNC: 97 U/L (ref 39–117)
ALT SERPL W P-5'-P-CCNC: 17 U/L (ref 1–41)
ANION GAP SERPL CALCULATED.3IONS-SCNC: 11.2 MMOL/L (ref 5–15)
AST SERPL-CCNC: 19 U/L (ref 1–40)
BILIRUB SERPL-MCNC: 0.2 MG/DL (ref 0–1.2)
BUN SERPL-MCNC: 18 MG/DL (ref 8–23)
BUN/CREAT SERPL: 15.4 (ref 7–25)
CALCIUM SPEC-SCNC: 9.1 MG/DL (ref 8.6–10.5)
CHLORIDE SERPL-SCNC: 104 MMOL/L (ref 98–107)
CO2 SERPL-SCNC: 23.8 MMOL/L (ref 22–29)
CREAT SERPL-MCNC: 1.17 MG/DL (ref 0.76–1.27)
GFR SERPL CREATININE-BSD FRML MDRD: 63 ML/MIN/1.73
GLOBULIN UR ELPH-MCNC: 2.6 GM/DL
GLUCOSE SERPL-MCNC: 110 MG/DL (ref 65–99)
MAGNESIUM SERPL-MCNC: 2.1 MG/DL (ref 1.6–2.4)
POTASSIUM SERPL-SCNC: 4 MMOL/L (ref 3.5–5.2)
PROT SERPL-MCNC: 6.7 G/DL (ref 6–8.5)
SODIUM SERPL-SCNC: 139 MMOL/L (ref 136–145)

## 2020-11-02 RX ORDER — DILTIAZEM HYDROCHLORIDE 300 MG/1
CAPSULE, EXTENDED RELEASE ORAL
Qty: 90 CAPSULE | Refills: 1 | Status: SHIPPED | OUTPATIENT
Start: 2020-11-02 | End: 2021-05-03

## 2020-11-02 NOTE — TELEPHONE ENCOUNTER
"Next appointment 04/05/2021.  LOV 10/02/2020    Assessment:        Diagnosis Plan   1. STEMI involving oth coronary artery of anterior wall (CMS/HCC)  Comprehensive Metabolic Panel     Magnesium   2. Essential hypertension  Comprehensive Metabolic Panel     Magnesium   3. Dyslipidemia      4. Tobacco dependence               Plan:       1. Essential HTN -      2. STEMI - cath 2/6/2020 with nonocclusive disease.  He is on DAPT and statin therapy.  Denies angina     3.  Dyslipidemia - Continue lipid lowering therapy, currently on atorvastatin.     4. Tobacco dependence - reeks of smoke.  Now smoking 3 packs a day.  States he could not afford the patch so \"I guess I will just keep smoking.\"     5.  Leg heaviness - check CMP and magnesium.  He did not want to have these drawn today, will come back next week.     Check Labs     RTO in 6 months with RM    "

## 2021-02-02 ENCOUNTER — TELEPHONE (OUTPATIENT)
Dept: INTERNAL MEDICINE | Facility: CLINIC | Age: 66
End: 2021-02-02

## 2021-02-02 NOTE — TELEPHONE ENCOUNTER
PATIENT STATES HE WAS TO BE REFERRED TO A PAIN MANAGEMENT CLINIC BUT HE HAS NOT HEARD ANYTHING.     PLEASE ADVISE: [phone]

## 2021-02-11 RX ORDER — ATORVASTATIN CALCIUM 40 MG/1
TABLET, FILM COATED ORAL
Qty: 90 TABLET | Refills: 3 | Status: SHIPPED | OUTPATIENT
Start: 2021-02-11 | End: 2022-04-25

## 2021-03-04 RX ORDER — CLOPIDOGREL BISULFATE 75 MG/1
TABLET ORAL
Qty: 30 TABLET | Refills: 8 | Status: SHIPPED | OUTPATIENT
Start: 2021-03-04 | End: 2022-03-01

## 2021-04-05 ENCOUNTER — OFFICE VISIT (OUTPATIENT)
Dept: CARDIOLOGY | Facility: CLINIC | Age: 66
End: 2021-04-05

## 2021-04-05 VITALS
HEART RATE: 77 BPM | BODY MASS INDEX: 25.9 KG/M2 | RESPIRATION RATE: 16 BRPM | WEIGHT: 185 LBS | SYSTOLIC BLOOD PRESSURE: 116 MMHG | OXYGEN SATURATION: 96 % | HEIGHT: 71 IN | DIASTOLIC BLOOD PRESSURE: 70 MMHG

## 2021-04-05 DIAGNOSIS — I73.9 PVD (PERIPHERAL VASCULAR DISEASE) WITH CLAUDICATION (HCC): ICD-10-CM

## 2021-04-05 DIAGNOSIS — I21.09 STEMI INVOLVING OTH CORONARY ARTERY OF ANTERIOR WALL (HCC): ICD-10-CM

## 2021-04-05 DIAGNOSIS — E78.5 DYSLIPIDEMIA: ICD-10-CM

## 2021-04-05 DIAGNOSIS — I10 ESSENTIAL HYPERTENSION: ICD-10-CM

## 2021-04-05 DIAGNOSIS — I25.10 CORONARY ARTERY DISEASE INVOLVING NATIVE CORONARY ARTERY OF NATIVE HEART WITHOUT ANGINA PECTORIS: Primary | ICD-10-CM

## 2021-04-05 PROCEDURE — 99214 OFFICE O/P EST MOD 30 MIN: CPT | Performed by: NURSE PRACTITIONER

## 2021-04-05 PROCEDURE — 93000 ELECTROCARDIOGRAM COMPLETE: CPT | Performed by: NURSE PRACTITIONER

## 2021-04-05 NOTE — PROGRESS NOTES
Date of Office Visit: 2021  Encounter Provider: CASS Farooq  Place of Service: [facility] CARDIOLOGY  Patient Name: Sammy Roblero  :1955  Primary Cardiologist: Dr. Martínez    CC:  6 month follow up    Dear Dr. De Jesus    HPI: Sammy Roblero is a pleasant 65 y.o. male who presents 2021 for cardiac follow up. I have reviewed past medical records including notes, labs and testing in preparation for today's visit.    64-year-old gentleman looks about 15 years older than his stated age.  Longstanding tobacco abuse does not go to the physician otherwise. He presented 2020  with stuttering chest pain off and on since the day prior. Hannah's EKG showed anterior ST elevation in V1 and 2 in 1 and aVL.  He was subsequently transferred to [facility] to the Cath Lab. He had no history of PND, orthopnea. Edema, bleeding and no history of stroke.  He stated he  had a history of blockage, maybe in his left carotid. He denied significant history of lung or kidney problems.     FINDINGS: 2010     LEFT VENTRICULOGRAPHY: The LV pressure was 140/10.  There was mid anterior to apical akinesis inferoapical akinesis EF is reduced and about 30% the proximal portion of the ventricle is very hyperdynamic.  There was no mitral insufficiency or gradient across the aortic valve on pullback.     CORONARY ANGIOGRAPHY:  Left main: Normal  Left anterior descending: Normal proximally 10% luminal irregularities in the mid and distal portion, diagonals are free of disease  Ramus intermedius: There is a 50 to 60% origin lesion.  OCT revealed that it was about a 50 to 60% lesion and was not a ruptured plaque.  Circumflex: Normal proximally 10% luminal irregularities in the midportion  RCA: Is a dominant vessel.  Diffuse 20 to 30% proximal lesion normal in the mid and distal portion some 30% origin PDA disease     SUMMARY: This is a pretty confusing case he clearly has ST  elevation in an odd pattern I do not think this is a [place] cardiomyopathy as he is a bail and the apex is out.  It does not appear that the ramus is causing all of this.  I think this likely could be vasospasm     RECOMMENDATIONS: We will do routine MI care but I am in a started on treatment for vasospasm have encouraged him to stop smoking he does not seem to have great insight into that     ECG shows impressive ST-T changes overnight consistent with evolution of his ischemic event. He was strongly encouraged  to stop smoking, but it appeared there was little chance of that acutally happening. He was started on a calcium channel blocker and a long-acting nitrate as well as ADP receptor blocker and a statin.  Beta-blockers are actually contraindicated in this situation and his LV function is still preserved. It was not felt he needed an ARB at that point. An echo was performed that showed:      Interpretation Summary 2/6/2020     · Estimated EF appears to be in the range of 66 - 70%.  · Left ventricular wall thickness is consistent with mild concentric hypertrophy.  · Left ventricular diastolic dysfunction is noted (grade I) consistent with impaired relaxation.  · Normal right ventricular cavity size and systolic function noted.  · Left atrial cavity size is mildly dilated.  · There is no evidence of pericardial effusion.      He was discharged home on 2/8/2020.     He then presented to [facility] on 3/1/2020 with chest pain that felt like his prior admission. Labs on 3/1/2020 show first normal troponin, then 0.283, and now 0.165, glucose 125, otherwise normal CMP, HDL 32, triglycerides 423, normal CBC.  Blood pressure was 166/108.  ECG showed sinus rhythm with deep T wave inversions in the anterolateral leads.  This was no change from prior ECG done 2/8/2020.  ECG the following morning was  unchanged from prior.  Chest x-ray showed no active disease.     He stopped taking his medications 2 1/2 weeks ago  due to neck pain.  He is not sure if medication is doing that.  He is smoking about 2 packs of cigarettes a day and drinking  whiskey weekly.  He said he could not afford NicoDerm so he was not using that.  Since being admitted,  his chest pain resolved with medication.  It was felt that his pain was related to noncompliance with his medication and continuation of tobacco.  Medication adjustments were made.  He had a limited echo on 3/2/2020 that showed:         Interpretation Summary      · Left ventricular systolic function is hyperdynamic (EF > 70%). Calculated EF = 70.0%. Estimated EF was in agreement with the calculated EF. Normal left ventricular cavity size noted. Left ventricular wall thickness is consistent with hypertrophy. Left ventricular diastolic function not assessed on this study. There is asymmetric apical hypertrophy with increased trabeculation.         A bilateral carotid US was completed that revealed:  IMPRESSION:  1. Large amount of plaque in both carotid systems.  2. Total occlusion of the left internal carotid artery at its origin,  which is reportedly a known finding.  3. Approximate 50-69% diameter reduction involving the mid right  internal carotid artery. However, grayscale images of this region  suggests the degree of stenosis may be higher. Recommend correlation  with CT angiography of the head and neck with contrast for further  evaluation.  4. Elevated peak systolic velocity in the right common carotid artery  and in the external carotid arteries bilaterally, suggesting degrees of  stenosis bilaterally.  5. Antegrade blood flow in both vertebral arteries.     He was referred to vascular surgery as outpatient.  He was discharged on 3/3/2020.     He states overall he feels fairly well.  He still smokes at least 2 packs a day.  This is actually down from 3 packs a day.  He complains of pain with walking.  This does get better if he stops and rest.  He had been referred to vascular in  the past.  He denies any palpitations, dizziness or lightheadedness, chest pain or chest pressure.  He does have fatigue.  He denies any lower extremity edema.  He denies any shortness of air but was breathing more heavily after walking up to our office.  He is taking his medications as directed.  His blood pressure is well controlled.  He denies any PND, cough, orthopnea.  He has not had any unexplained bleeding, dark or tarry stools.       Past Medical History:   Diagnosis Date   • Arthritis    • COPD (chronic obstructive pulmonary disease) (CMS/[facility])    • Coronary artery disease    • Elevated cholesterol    • Hx of seasonal allergies    • Hypertension        Past Surgical History:   Procedure Laterality Date   • AMPUTATION FINGER / THUMB Left    • CARDIAC CATHETERIZATION N/A 2/6/2020    Procedure: Left Heart Cath;  Surgeon: Darwin Chao MD;  Location: [facility] CATH INVASIVE LOCATION;  Service: Cardiovascular;  Laterality: N/A;   • CARDIAC CATHETERIZATION N/A 2/6/2020    Procedure: Left ventriculography;  Surgeon: Darwin Chao MD;  Location: [facility] CATH INVASIVE LOCATION;  Service: Cardiovascular;  Laterality: N/A;   • CARDIAC CATHETERIZATION N/A 2/6/2020    Procedure: Optical Coherent Tomography;  Surgeon: Darwin Chao MD;  Location: [facility] CATH INVASIVE LOCATION;  Service: Cardiovascular;  Laterality: N/A;   • CARDIAC CATHETERIZATION N/A 2/6/2020    Procedure: Coronary angiography;  Surgeon: Darwin Chao MD;  Location: [facility] CATH INVASIVE LOCATION;  Service: Cardiovascular;  Laterality: N/A;   • SINUS SURGERY         Social History     Socioeconomic History   • Marital status: Significant Other     Spouse name: Not on file   • Number of children: Not on file   • Years of education: Not on file   • Highest education level: Not on file   Tobacco Use   • Smoking status: Current Every Day Smoker     Packs/day: 2.00     Types: Cigarettes   • Smokeless tobacco: Former User     Types: Chew   • Tobacco  comment: daily caffiene   Substance and Sexual Activity   • Alcohol use: Yes     Alcohol/week: 3.0 standard drinks     Types: 3 Shots of liquor per week     Comment: once a week if that   • Drug use: Not Currently       No family history on file.    The following portion of the patient's history were reviewed and updated as appropriate: past medical history, past surgical history, past social history, past family history, allergies, current medications, and problem list.    Review of Systems   Constitutional: Positive for malaise/fatigue. Negative for diaphoresis and fever.   HENT: Negative for congestion, hearing loss, hoarse voice, nosebleeds and sore throat.    Eyes: Negative for photophobia, vision loss in left eye, vision loss in right eye and visual disturbance.   Cardiovascular: Positive for claudication. Negative for chest pain, dyspnea on exertion, irregular heartbeat, leg swelling, near-syncope, orthopnea, palpitations, paroxysmal nocturnal dyspnea and syncope.   Respiratory: Positive for shortness of breath (with exertion). Negative for cough, hemoptysis, sleep disturbances due to breathing, snoring, sputum production and wheezing.    Endocrine: Negative for cold intolerance, heat intolerance, polydipsia, polyphagia and polyuria.   Hematologic/Lymphatic: Negative for bleeding problem. Does not bruise/bleed easily.   Skin: Negative for color change, dry skin, poor wound healing, rash and suspicious lesions.   Musculoskeletal: Positive for back pain. Negative for arthritis, falls, gout, joint pain, joint swelling, muscle cramps, muscle weakness and myalgias.   Gastrointestinal: Negative for bloating, abdominal pain, constipation, diarrhea, dysphagia, melena, nausea and vomiting.   Neurological: Negative for excessive daytime sleepiness, dizziness, headaches, light-headedness, loss of balance, numbness, paresthesias, seizures, vertigo and weakness.   Psychiatric/Behavioral: Negative for depression, memory  "loss and substance abuse. The patient is not nervous/anxious.        No Known Allergies      Current Outpatient Medications:   •  aspirin 81 MG EC tablet, Take 1 tablet by mouth Daily., Disp: 30 tablet, Rfl: 11  •  atorvastatin (LIPITOR) 40 MG tablet, TAKE ONE TABLET BY MOUTH DAILY, Disp: 90 tablet, Rfl: 3  •  Cartia  MG 24 hr capsule, TAKE ONE CAPSULE BY MOUTH DAILY, Disp: 90 capsule, Rfl: 1  •  clopidogrel (PLAVIX) 75 MG tablet, TAKE ONE TABLET BY MOUTH DAILY, Disp: 30 tablet, Rfl: 8        Objective:     Vitals:    04/05/21 1148   BP: 116/70   Pulse: 77   Resp: 16   SpO2: 96%   Weight: 83.9 kg (185 lb)   Height: 180.3 cm (71\")     Body mass index is 25.8 kg/m².      Vitals reviewed.   Constitutional:       General: Not in acute distress.     Appearance: Healthy appearance. Well-developed and normal weight.   Eyes:      General:         Right eye: No discharge.         Left eye: No discharge.      Conjunctiva/sclera: Conjunctivae normal.   HENT:      Head: Normocephalic and atraumatic.      Right Ear: External ear normal.      Left Ear: External ear normal.      Nose: Nose normal.   Neck:      Thyroid: No thyromegaly.      Vascular: No JVD.      Trachea: No tracheal deviation.      Lymphadenopathy: No cervical adenopathy.   Pulmonary:      Effort: Pulmonary effort is normal. No respiratory distress.      Breath sounds: Normal breath sounds. No wheezing. No rales.   Chest:      Chest wall: Not tender to palpatation.   Cardiovascular:      Normal rate. Regular rhythm.      No gallop.   Pulses:     Intact distal pulses.   Edema:     Peripheral edema absent.   Abdominal:      General: There is no distension.      Palpations: Abdomen is soft.      Tenderness: There is no abdominal tenderness.   Musculoskeletal: Normal range of motion.         General: No tenderness or deformity.      Cervical back: Normal range of motion and neck supple. Skin:     General: Skin is warm and dry.      Findings: No erythema or rash. " "  Neurological:      Mental Status: Alert and oriented to person, place, and time.      Coordination: Coordination normal.   Psychiatric:         Attention and Perception: Attention normal.         Mood and Affect: Mood normal.         Speech: Speech normal.         Behavior: Behavior normal. Behavior is cooperative.         Thought Content: Thought content normal.         Cognition and Memory: Cognition normal.         Judgment: Judgment normal.               ECG 12 Lead    Date/Time: 4/5/2021 11:51 AM  Performed by: Gayle Simpson APRN  Authorized by: Gayle Simpson APRN   Comparison: compared with previous ECG from 10/2/2020  Similar to previous ECG  Rhythm: sinus rhythm  Rate: normal  Conduction: conduction normal  ST Segments: ST segments normal  T flattening: aVL and V1  QRS axis: left    Clinical impression: abnormal EKG              Assessment:       Diagnosis Plan   1. Coronary artery disease involving native coronary artery of native heart without angina pectoris     2. STEMI involving oth coronary artery of anterior wall (CMS/HCC)     3. Essential hypertension     4. Dyslipidemia     5. PVD (peripheral vascular disease) with claudication (CMS/HCC)  US ankle / brachial indices extremity complete          Plan:       1. Essential HTN - well controlled     2. STEMI - cath 2/6/2020 with nonocclusive disease.  He is on DAPT and statin therapy. Denies angina     3.  Dyslipidemia - continue lipid-lowering therapy.  He is currently on atorvastatin.     4. Tobacco dependence -he states he is still smoking and \"I do not go over 2 packs a day\".  Advised cessation.     5.  Claudication pain-we will check ABIs.     No medicine changes, check CHADWICK  RTO in 6 months with RM    As always, it has been a pleasure to participate in your patient's care. Thank you.       Sincerely,       CASS Farooq      Current Outpatient Medications:   •  aspirin 81 MG EC tablet, Take 1 tablet by mouth Daily., Disp: 30 tablet, Rfl: 11  • "  atorvastatin (LIPITOR) 40 MG tablet, TAKE ONE TABLET BY MOUTH DAILY, Disp: 90 tablet, Rfl: 3  •  Cartia  MG 24 hr capsule, TAKE ONE CAPSULE BY MOUTH DAILY, Disp: 90 capsule, Rfl: 1  •  clopidogrel (PLAVIX) 75 MG tablet, TAKE ONE TABLET BY MOUTH DAILY, Disp: 30 tablet, Rfl: 8    Dictated utilizing Dragon dictation

## 2021-04-16 ENCOUNTER — HOSPITAL ENCOUNTER (OUTPATIENT)
Dept: ULTRASOUND IMAGING | Facility: HOSPITAL | Age: 66
Discharge: HOME | End: 2021-04-16
Admitting: NURSE PRACTITIONER

## 2021-04-16 DIAGNOSIS — I73.9 PVD (PERIPHERAL VASCULAR DISEASE) WITH CLAUDICATION (HCC): ICD-10-CM

## 2021-04-16 PROCEDURE — 93923 UPR/LXTR ART STDY 3+ LVLS: CPT

## 2021-04-19 DIAGNOSIS — R68.89 ABNORMAL ANKLE BRACHIAL INDEX (ABI): Primary | ICD-10-CM

## 2021-04-19 NOTE — PROGRESS NOTES
Spoke to patient with results.  Instructed him I wouldmake a referral to [facility].  He was agreeable to POC.

## 2021-05-03 RX ORDER — DILTIAZEM HYDROCHLORIDE 300 MG/1
CAPSULE, EXTENDED RELEASE ORAL
Qty: 90 CAPSULE | Refills: 1 | Status: SHIPPED | OUTPATIENT
Start: 2021-05-03 | End: 2021-11-05

## 2021-07-26 ENCOUNTER — OFFICE VISIT (OUTPATIENT)
Dept: INTERNAL MEDICINE | Facility: CLINIC | Age: 66
End: 2021-07-26

## 2021-07-26 VITALS
DIASTOLIC BLOOD PRESSURE: 80 MMHG | RESPIRATION RATE: 18 BRPM | OXYGEN SATURATION: 97 % | WEIGHT: 184 LBS | HEIGHT: 71 IN | SYSTOLIC BLOOD PRESSURE: 124 MMHG | HEART RATE: 80 BPM | TEMPERATURE: 97 F | BODY MASS INDEX: 25.76 KG/M2

## 2021-07-26 DIAGNOSIS — K21.9 GASTROESOPHAGEAL REFLUX DISEASE WITHOUT ESOPHAGITIS: ICD-10-CM

## 2021-07-26 DIAGNOSIS — N52.9 ERECTILE DYSFUNCTION, UNSPECIFIED ERECTILE DYSFUNCTION TYPE: ICD-10-CM

## 2021-07-26 DIAGNOSIS — M54.50 LUMBAR BACK PAIN: Primary | ICD-10-CM

## 2021-07-26 PROCEDURE — 99214 OFFICE O/P EST MOD 30 MIN: CPT | Performed by: INTERNAL MEDICINE

## 2021-07-26 RX ORDER — SILDENAFIL 50 MG/1
50 TABLET, FILM COATED ORAL DAILY PRN
Qty: 30 TABLET | Refills: 1 | Status: SHIPPED | OUTPATIENT
Start: 2021-07-26 | End: 2021-09-30

## 2021-07-26 RX ORDER — PANTOPRAZOLE SODIUM 40 MG/1
40 TABLET, DELAYED RELEASE ORAL DAILY
Qty: 90 TABLET | Refills: 2 | Status: SHIPPED | OUTPATIENT
Start: 2021-07-26 | End: 2022-04-24

## 2021-09-29 DIAGNOSIS — N52.9 ERECTILE DYSFUNCTION, UNSPECIFIED ERECTILE DYSFUNCTION TYPE: ICD-10-CM

## 2021-09-29 NOTE — TELEPHONE ENCOUNTER
Rx Refill Note  Requested Prescriptions     Pending Prescriptions Disp Refills   • sildenafil (VIAGRA) 50 MG tablet [Pharmacy Med Name: SILDENAFIL 50 MG TABLET] 30 tablet 1     Sig: TAKE ONE TABLET BY MOUTH DAILY AS NEEDED FOR ERECTILE DYSFUNCTION      Last office visit with prescribing clinician: 7/26/2021      Next office visit with prescribing clinician: Visit date not found            Aldo Rodas MA  09/29/21, 09:48 EDT

## 2021-09-30 RX ORDER — SILDENAFIL 50 MG/1
TABLET, FILM COATED ORAL
Qty: 30 TABLET | Refills: 1 | Status: SHIPPED | OUTPATIENT
Start: 2021-09-30 | End: 2021-11-29

## 2021-11-05 RX ORDER — DILTIAZEM HYDROCHLORIDE 300 MG/1
CAPSULE, EXTENDED RELEASE ORAL
Qty: 90 CAPSULE | Refills: 1 | Status: SHIPPED | OUTPATIENT
Start: 2021-11-05 | End: 2022-07-20

## 2021-11-05 NOTE — TELEPHONE ENCOUNTER
Rx Refill Note  Requested Prescriptions     Pending Prescriptions Disp Refills    Cartia  MG 24 hr capsule [Pharmacy Med Name: CARTIA  MG CAPSULE] 90 capsule 1     Sig: TAKE ONE CAPSULE BY MOUTH DAILY      Last office visit with prescribing clinician: 4/5/2021      Next office visit with prescribing clinician: Visit date not found            Dahlia Drake MA  11/05/21, 12:52 EDT

## 2021-11-27 DIAGNOSIS — N52.9 ERECTILE DYSFUNCTION, UNSPECIFIED ERECTILE DYSFUNCTION TYPE: ICD-10-CM

## 2021-11-29 RX ORDER — SILDENAFIL 50 MG/1
TABLET, FILM COATED ORAL
Qty: 30 TABLET | Refills: 1 | Status: SHIPPED | OUTPATIENT
Start: 2021-11-29 | End: 2022-02-02

## 2022-02-01 DIAGNOSIS — N52.9 ERECTILE DYSFUNCTION, UNSPECIFIED ERECTILE DYSFUNCTION TYPE: ICD-10-CM

## 2022-02-02 RX ORDER — SILDENAFIL 50 MG/1
TABLET, FILM COATED ORAL
Qty: 30 TABLET | Refills: 1 | Status: SHIPPED | OUTPATIENT
Start: 2022-02-02 | End: 2023-01-25 | Stop reason: SDUPTHER

## 2022-02-02 NOTE — TELEPHONE ENCOUNTER
Rx Refill Note  Requested Prescriptions     Pending Prescriptions Disp Refills   • sildenafil (VIAGRA) 50 MG tablet [Pharmacy Med Name: SILDENAFIL 50 MG TABLET] 30 tablet 1     Sig: TAKE ONE TABLET BY MOUTH DAILY AS NEEDED FOR ERECTILE DYSFUNCTION      Last office visit with prescribing clinician: 7/26/2021      Next office visit with prescribing clinician: Visit date not found            Aldo Rodas MA  02/02/22, 08:02 EST

## 2022-03-01 RX ORDER — CLOPIDOGREL BISULFATE 75 MG/1
TABLET ORAL
Qty: 30 TABLET | Refills: 8 | Status: SHIPPED | OUTPATIENT
Start: 2022-03-01 | End: 2022-12-14

## 2022-04-04 ENCOUNTER — OFFICE VISIT (OUTPATIENT)
Dept: CARDIOLOGY | Facility: CLINIC | Age: 67
End: 2022-04-04

## 2022-04-04 VITALS
WEIGHT: 184 LBS | DIASTOLIC BLOOD PRESSURE: 88 MMHG | HEIGHT: 71 IN | OXYGEN SATURATION: 98 % | BODY MASS INDEX: 25.76 KG/M2 | HEART RATE: 70 BPM | SYSTOLIC BLOOD PRESSURE: 140 MMHG

## 2022-04-04 DIAGNOSIS — I25.10 CORONARY ARTERY DISEASE INVOLVING NATIVE CORONARY ARTERY OF NATIVE HEART WITHOUT ANGINA PECTORIS: Primary | ICD-10-CM

## 2022-04-04 DIAGNOSIS — F17.200 TOBACCO DEPENDENCE: ICD-10-CM

## 2022-04-04 DIAGNOSIS — I21.09 STEMI INVOLVING OTH CORONARY ARTERY OF ANTERIOR WALL: ICD-10-CM

## 2022-04-04 DIAGNOSIS — E78.5 DYSLIPIDEMIA: ICD-10-CM

## 2022-04-04 DIAGNOSIS — I10 PRIMARY HYPERTENSION: ICD-10-CM

## 2022-04-04 PROCEDURE — 99214 OFFICE O/P EST MOD 30 MIN: CPT | Performed by: INTERNAL MEDICINE

## 2022-04-04 PROCEDURE — 93000 ELECTROCARDIOGRAM COMPLETE: CPT | Performed by: INTERNAL MEDICINE

## 2022-04-04 RX ORDER — LOSARTAN POTASSIUM 25 MG/1
25 TABLET ORAL DAILY
Qty: 90 TABLET | Refills: 3 | Status: SHIPPED | OUTPATIENT
Start: 2022-04-04 | End: 2022-12-08 | Stop reason: ALTCHOICE

## 2022-04-04 NOTE — PROGRESS NOTES
Date of Office Visit: 2022  Encounter Provider: Darcy Martínez MD  Place of Service: [facility] CARDIOLOGY  Patient Name: Sammy Roblero  :1955      Patient ID:  Sammy Roblero is a 66 y.o. male is here for  followup for CAD.        History of Present Illness    He presented 2020  with stuttering chest pain off and on since the day prior. Hannah's EKG showed anterior ST elevation in V1 and 2 in 1 and aVL.  He was subsequently transferred to [facility] to the Cath Lab.  Angiography showed normal left main, 10% luminal LAD stenosis, 50 to 60% origin ramus stenosis, 10% luminal circumflex stenosis and 20 to 30% mid to distal RCA stenosis.  It was felt that his ST changes and chest pain may have been due to vasospasm and he was advised to stop smoking.  He was also started on calcium channel blocker and long-acting nitrates.  Echo at that time showed ejection fraction 66 to 70% with grade 1 diastolic dysfunction and mild concentric left ventricle hypertrophy.     He then presented to [facility] on 3/1/2020 with chest pain that felt like his prior admission. Labs on 3/1/2020 show first normal troponin, then 0.283, and now 0.165, glucose 125, otherwise normal CMP, HDL 32, triglycerides 423, normal CBC.  Blood pressure was 166/108.  ECG showed sinus rhythm with deep T wave inversions in the anterolateral leads.  This was no change from prior ECG done 2020.  ECG the following morning was  unchanged from prior.  Chest x-ray showed no active disease.  Repeat echocardiogram done 3/2/2020 surgery fraction is 70% with asymmetric apical hypertrophy.    He continues to smoke 2 packs of cigarettes a day.  He saw Dr. Tenorio 2022 for follow-up for PAD and carotid disease.  His studies then showed 50 to 60% right internal carotid stenosis and occluded left internal carotid artery.  His CHADWICK showed moderate bilateral lower extremity occlusive disease.  No  medication changes were made.  He was advised stop smoking and follow-up in a year.    He does get some chest pain.  He has mild exertional short windedness.  He has numbness in his feet as well as a cold feeling.  His legs fatigue easily when he walks.  He does not feels heart racing or skipping.  He has no orthopnea or PND.  His energy level is stable.  He is taking his medications as directed.    Past Medical History:   Diagnosis Date   • Arthritis    • COPD (chronic obstructive pulmonary disease) (HCC)    • Coronary artery disease    • Elevated cholesterol    • Hx of seasonal allergies    • Hypertension          Past Surgical History:   Procedure Laterality Date   • AMPUTATION FINGER / THUMB Left    • CARDIAC CATHETERIZATION N/A 2/6/2020    Procedure: Left Heart Cath;  Surgeon: Darwin Chao MD;  Location: [facility] CATH INVASIVE LOCATION;  Service: Cardiovascular;  Laterality: N/A;   • CARDIAC CATHETERIZATION N/A 2/6/2020    Procedure: Left ventriculography;  Surgeon: Darwin Chao MD;  Location: [facility] CATH INVASIVE LOCATION;  Service: Cardiovascular;  Laterality: N/A;   • CARDIAC CATHETERIZATION N/A 2/6/2020    Procedure: Optical Coherent Tomography;  Surgeon: Darwin Chao MD;  Location: [facility] CATH INVASIVE LOCATION;  Service: Cardiovascular;  Laterality: N/A;   • CARDIAC CATHETERIZATION N/A 2/6/2020    Procedure: Coronary angiography;  Surgeon: Darwin Chao MD;  Location: [facility] INVASIVE LOCATION;  Service: Cardiovascular;  Laterality: N/A;   • SINUS SURGERY         Current Outpatient Medications on File Prior to Visit   Medication Sig Dispense Refill   • aspirin 81 MG EC tablet Take 1 tablet by mouth Daily. 30 tablet 11   • atorvastatin (LIPITOR) 40 MG tablet TAKE ONE TABLET BY MOUTH DAILY 90 tablet 3   • Cartia  MG 24 hr capsule TAKE ONE CAPSULE BY MOUTH DAILY 90 capsule 1   • clopidogrel (PLAVIX) 75 MG tablet TAKE ONE TABLET BY MOUTH DAILY 30 tablet 8   • pantoprazole (PROTONIX) 40  "MG EC tablet Take 1 tablet by mouth Daily. 90 tablet 2   • sildenafil (VIAGRA) 50 MG tablet TAKE ONE TABLET BY MOUTH DAILY AS NEEDED FOR ERECTILE DYSFUNCTION 30 tablet 1     No current facility-administered medications on file prior to visit.       Social History     Socioeconomic History   • Marital status: Significant Other   Tobacco Use   • Smoking status: Current Every Day Smoker     Packs/day: 2.00     Types: Cigarettes   • Smokeless tobacco: Former User     Types: Chew   • Tobacco comment: daily caffiene   Substance and Sexual Activity   • Alcohol use: Yes     Alcohol/week: 3.0 standard drinks     Types: 3 Shots of liquor per week     Comment: once a week if that   • Drug use: Not Currently           ROS    Procedures    ECG 12 Lead    Date/Time: 4/4/2022 11:09 AM  Performed by: Darcy Martínez MD  Authorized by: Darcy Martínez MD   Comparison: compared with previous ECG   Similar to previous ECG  Rhythm: sinus rhythm    Clinical impression: normal ECG                Objective:      Vitals:    04/04/22 1045   BP: 140/88   Pulse: 70   SpO2: 98%   Weight: 83.5 kg (184 lb)   Height: 180.3 cm (71\")     Body mass index is 25.66 kg/m².    Vitals reviewed.   Constitutional:       General: Not in acute distress.     Appearance: Well-developed. Not diaphoretic.   Eyes:      General: No scleral icterus.     Conjunctiva/sclera: Conjunctivae normal.   HENT:      Head: Normocephalic and atraumatic.   Neck:      Thyroid: No thyromegaly.      Vascular: No carotid bruit or JVD.      Lymphadenopathy: No cervical adenopathy.   Pulmonary:      Effort: Pulmonary effort is normal. No respiratory distress.      Breath sounds: Normal breath sounds. No wheezing. No rhonchi. No rales.   Chest:      Chest wall: Not tender to palpatation.   Cardiovascular:      Normal rate. Regular rhythm.      Murmurs: There is no murmur.      No gallop.   Pulses:     Intact distal pulses.   Edema:     Peripheral edema absent.   Abdominal: "      General: Bowel sounds are normal. There is no distension or abdominal bruit.      Palpations: Abdomen is soft. There is no abdominal mass.      Tenderness: There is no abdominal tenderness.   Musculoskeletal:         General: No deformity.      Extremities: No clubbing present.     Cervical back: Neck supple. Skin:     General: Skin is warm and dry. There is no cyanosis.      Coloration: Skin is not pale.      Findings: No rash.   Neurological:      Mental Status: Alert and oriented to person, place, and time.      Cranial Nerves: No cranial nerve deficit.   Psychiatric:         Judgment: Judgment normal.         Lab Review:       Assessment:      Diagnosis Plan   1. Coronary artery disease involving native coronary artery of native heart without angina pectoris     2. STEMI involving oth coronary artery of anterior wall (HCC)     3. Dyslipidemia     4. Primary hypertension     5. Tobacco dependence         1. STEMI on ECG with nonocclusive disease, likely due to vasospasm, is on calcium channel blocker, clopidogrel and aspirin.  2. Essential HTN - controlled  3. Dyslipidemia - continue on lipid lowering therapy.  He is currently on atorvastatin.  4. Tobacco dependence -he smokes 2 packs of cigarettes daily.  He tells me this is smoking a cigarette every 15 minutes while he is awake.  5. Occluded left carotid artery with 50-60% right carotid artery stenosis, follows with Dr. Tenorio.   6. Moderate bilateral lower extremity peripheral arterial disease     Plan:       Start losartan 25 mg daily for hypertension, see Maco in 6 months.

## 2022-04-24 DIAGNOSIS — K21.9 GASTROESOPHAGEAL REFLUX DISEASE WITHOUT ESOPHAGITIS: ICD-10-CM

## 2022-04-24 RX ORDER — PANTOPRAZOLE SODIUM 40 MG/1
TABLET, DELAYED RELEASE ORAL
Qty: 90 TABLET | Refills: 2 | Status: SHIPPED | OUTPATIENT
Start: 2022-04-24 | End: 2023-01-24 | Stop reason: SDUPTHER

## 2022-04-25 RX ORDER — ATORVASTATIN CALCIUM 40 MG/1
TABLET, FILM COATED ORAL
Qty: 90 TABLET | Refills: 3 | Status: SHIPPED | OUTPATIENT
Start: 2022-04-25 | End: 2023-02-27

## 2022-04-25 NOTE — TELEPHONE ENCOUNTER
Rx Refill Note  Requested Prescriptions     Pending Prescriptions Disp Refills   • atorvastatin (LIPITOR) 40 MG tablet [Pharmacy Med Name: ATORVASTATIN 40 MG TABLET] 90 tablet 3     Sig: TAKE ONE TABLET BY MOUTH DAILY      Last office visit with prescribing clinician: 4/4/2022  RM      Next office visit with prescribing clinician: 10/4/2022          {TIP  Is Refill Pharmacy correct?:23}  Josey Garner MA  04/25/22, 15:46 EDT

## 2022-07-20 RX ORDER — DILTIAZEM HYDROCHLORIDE 300 MG/1
CAPSULE, COATED, EXTENDED RELEASE ORAL
Qty: 90 CAPSULE | Refills: 1 | Status: SHIPPED | OUTPATIENT
Start: 2022-07-20 | End: 2023-01-13 | Stop reason: SDUPTHER

## 2022-12-08 ENCOUNTER — OFFICE VISIT (OUTPATIENT)
Dept: CARDIOLOGY | Facility: CLINIC | Age: 67
End: 2022-12-08

## 2022-12-08 VITALS
SYSTOLIC BLOOD PRESSURE: 110 MMHG | RESPIRATION RATE: 16 BRPM | HEART RATE: 77 BPM | WEIGHT: 179 LBS | DIASTOLIC BLOOD PRESSURE: 80 MMHG | BODY MASS INDEX: 25.06 KG/M2 | OXYGEN SATURATION: 94 % | HEIGHT: 71 IN

## 2022-12-08 DIAGNOSIS — I10 PRIMARY HYPERTENSION: ICD-10-CM

## 2022-12-08 DIAGNOSIS — I25.10 CORONARY ARTERY DISEASE INVOLVING NATIVE CORONARY ARTERY OF NATIVE HEART WITHOUT ANGINA PECTORIS: Primary | ICD-10-CM

## 2022-12-08 DIAGNOSIS — E78.5 DYSLIPIDEMIA: ICD-10-CM

## 2022-12-08 DIAGNOSIS — F17.200 TOBACCO DEPENDENCE: ICD-10-CM

## 2022-12-08 DIAGNOSIS — I21.09 STEMI INVOLVING OTH CORONARY ARTERY OF ANTERIOR WALL: ICD-10-CM

## 2022-12-08 PROCEDURE — 99214 OFFICE O/P EST MOD 30 MIN: CPT | Performed by: NURSE PRACTITIONER

## 2022-12-08 PROCEDURE — 93000 ELECTROCARDIOGRAM COMPLETE: CPT | Performed by: NURSE PRACTITIONER

## 2022-12-08 NOTE — PROGRESS NOTES
Date of Office Visit: 2022  Encounter Provider: CASS Farooq  Place of Service: [facility] CARDIOLOGY  Patient Name: Sammy Roblero  :1955  Primary Cardiologist: Dr. Martínez    CC:  6 month follow up    Dear Dr. De Jesus    HPI: Sammy Roblero is a pleasant 67 y.o. male who presents 2022 for cardiac follow up. I have reviewed his past medical records including notes, labs and testing in preparation for today's visit.    He presented 2020  with stuttering chest pain off and on since the day prior. [facility] EKG showed anterior ST elevation in V1 and 2 in 1 and aVL.  He was subsequently transferred to [facility] to the Cath Lab.  Angiography showed normal left main, 10% luminal LAD stenosis, 50 to 60% origin ramus stenosis, 10% luminal circumflex stenosis and 20 to 30% mid to distal RCA stenosis.  It was felt that his ST changes and chest pain may have been due to vasospasm and he was advised to stop smoking.  He was also started on calcium channel blocker and long-acting nitrates.  Echo at that time showed ejection fraction 66 to 70% with grade 1 diastolic dysfunction and mild concentric left ventricle hypertrophy.     He then presented to [facility] on 3/1/2020 with chest pain that felt like his prior admission. Labs on 3/1/2020 show first normal troponin, then 0.283, and now 0.165, glucose 125, otherwise normal CMP, HDL 32, triglycerides 423, normal CBC.  Blood pressure was 166/108.  ECG showed sinus rhythm with deep T wave inversions in the anterolateral leads.  This was no change from prior ECG done 2020.  ECG the following morning was  unchanged from prior.  Chest x-ray showed no active disease.  Repeat echocardiogram done 3/2/2020 surgery fraction is 70% with asymmetric apical hypertrophy.     .  He saw Dr. Tenorio 2022 for follow-up for PAD and carotid disease.  His studies then showed 50 to 60% right internal carotid stenosis  and occluded left internal carotid artery.  His CHADWICK showed moderate bilateral lower extremity occlusive disease.  No medication changes were made.  He was advised stop smoking and follow-up in a year.     He saw Dr. Martínez in April 2022.  He does get some chest pain.  He has mild exertional short windedness.  He has numbness in his feet as well as a cold feeling.  His legs fatigue easily when he walks.  He does not feels heart racing or skipping.  He has no orthopnea or PND.  His energy level is stable.  He is taking his medications as directed.    He presents today for 6-month follow-up.  He denies any palpitations, lower extremity edema, dizziness or lightheadedness.  He has not had any syncopal or presyncopal episodes.  He denies fatigue.  He states he does have some shortness of breath at times that is unchanged.  He continues to smoke 2 packs of cigarettes a day.  He states he has intermittent chest discomfort but that it has not been bad for a while.  His blood pressure is well controlled.  He is taking his medications as directed.  He does have some leg pain when walking.  He does follow with vascular and has an appointment upcoming in January.  Past Medical History:   Diagnosis Date   • Arthritis    • COPD (chronic obstructive pulmonary disease) (HCC)    • Coronary artery disease    • Elevated cholesterol    • Hx of seasonal allergies    • Hypertension        Past Surgical History:   Procedure Laterality Date   • AMPUTATION FINGER / THUMB Left    • CARDIAC CATHETERIZATION N/A 2/6/2020    Procedure: Left Heart Cath;  Surgeon: Darwin Chao MD;  Location: [facility] INVASIVE LOCATION;  Service: Cardiovascular;  Laterality: N/A;   • CARDIAC CATHETERIZATION N/A 2/6/2020    Procedure: Left ventriculography;  Surgeon: Darwin Chao MD;  Location: [facility] INVASIVE LOCATION;  Service: Cardiovascular;  Laterality: N/A;   • CARDIAC CATHETERIZATION N/A 2/6/2020    Procedure: Optical Coherent Tomography;   Surgeon: Darwin Chao MD;  Location: [facility] CATH INVASIVE LOCATION;  Service: Cardiovascular;  Laterality: N/A;   • CARDIAC CATHETERIZATION N/A 2/6/2020    Procedure: Coronary angiography;  Surgeon: Darwin Chao MD;  Location: [facility] CATH INVASIVE LOCATION;  Service: Cardiovascular;  Laterality: N/A;   • SINUS SURGERY         Social History     Socioeconomic History   • Marital status: Significant Other   Tobacco Use   • Smoking status: Every Day     Packs/day: 2.00     Types: Cigarettes   • Smokeless tobacco: Former     Types: Chew   • Tobacco comments:     daily caffiene   Substance and Sexual Activity   • Alcohol use: Yes     Alcohol/week: 3.0 standard drinks     Types: 3 Shots of liquor per week     Comment: once a week if that   • Drug use: Not Currently       No family history on file.    The following portion of the patient's history were reviewed and updated as appropriate: past medical history, past surgical history, past social history, past family history, allergies, current medications, and problem list.    Review of Systems   Constitutional: Negative for diaphoresis, fever and malaise/fatigue.   HENT: Negative for congestion, hearing loss, hoarse voice, nosebleeds and sore throat.    Eyes: Negative for photophobia, vision loss in left eye, vision loss in right eye and visual disturbance.   Cardiovascular: Positive for chest pain (intermittent and unchanged) and claudication (follows with vascular). Negative for dyspnea on exertion, irregular heartbeat, leg swelling, near-syncope, orthopnea, palpitations, paroxysmal nocturnal dyspnea and syncope.   Respiratory: Positive for shortness of breath (unchanged). Negative for cough, hemoptysis, sleep disturbances due to breathing, snoring, sputum production and wheezing.    Endocrine: Negative for cold intolerance, heat intolerance, polydipsia, polyphagia and polyuria.   Hematologic/Lymphatic: Negative for bleeding problem. Does not bruise/bleed easily.  "  Skin: Negative for color change, dry skin, poor wound healing, rash and suspicious lesions.   Musculoskeletal: Negative for arthritis, back pain, falls, gout, joint pain, joint swelling, muscle cramps, muscle weakness and myalgias.   Gastrointestinal: Negative for bloating, abdominal pain, constipation, diarrhea, dysphagia, melena, nausea and vomiting.   Neurological: Negative for excessive daytime sleepiness, dizziness, headaches, light-headedness, loss of balance, numbness, paresthesias, seizures, vertigo and weakness.   Psychiatric/Behavioral: Negative for depression, memory loss and substance abuse. The patient is not nervous/anxious.        No Known Allergies      Current Outpatient Medications:   •  aspirin 81 MG EC tablet, Take 1 tablet by mouth Daily., Disp: 30 tablet, Rfl: 11  •  atorvastatin (LIPITOR) 40 MG tablet, TAKE ONE TABLET BY MOUTH DAILY, Disp: 90 tablet, Rfl: 3  •  clopidogrel (PLAVIX) 75 MG tablet, TAKE ONE TABLET BY MOUTH DAILY, Disp: 30 tablet, Rfl: 8  •  dilTIAZem CD (CARDIZEM CD) 300 MG 24 hr capsule, TAKE ONE CAPSULE BY MOUTH DAILY, Disp: 90 capsule, Rfl: 1  •  pantoprazole (PROTONIX) 40 MG EC tablet, TAKE ONE TABLET BY MOUTH DAILY, Disp: 90 tablet, Rfl: 2  •  sildenafil (VIAGRA) 50 MG tablet, TAKE ONE TABLET BY MOUTH DAILY AS NEEDED FOR ERECTILE DYSFUNCTION, Disp: 30 tablet, Rfl: 1        Objective:     Vitals:    12/08/22 0946   BP: 110/80   Pulse: 77   Resp: 16   SpO2: 94%   Weight: 81.2 kg (179 lb)   Height: 180.3 cm (71\")     Body mass index is 24.97 kg/m².      Vitals reviewed.   Constitutional:       General: Not in acute distress.     Appearance: Well-developed and not in distress.   Eyes:      General:         Right eye: No discharge.         Left eye: No discharge.      Conjunctiva/sclera: Conjunctivae normal.   HENT:      Head: Normocephalic and atraumatic.      Right Ear: External ear normal.      Left Ear: External ear normal.      Nose: Nose normal.   Neck:      Thyroid: No " thyromegaly.      Vascular: No JVD.      Trachea: No tracheal deviation.      Lymphadenopathy: No cervical adenopathy.   Pulmonary:      Effort: Pulmonary effort is normal. No respiratory distress.      Breath sounds: Normal breath sounds. No wheezing. No rales.   Chest:      Chest wall: Not tender to palpatation.   Cardiovascular:      Normal rate. Regular rhythm.      No gallop.   Pulses:     Intact distal pulses.   Edema:     Peripheral edema absent.   Abdominal:      General: There is no distension.      Palpations: Abdomen is soft.      Tenderness: There is no abdominal tenderness.   Musculoskeletal: Normal range of motion.         General: No tenderness or deformity.      Cervical back: Normal range of motion and neck supple. Skin:     General: Skin is warm and dry.      Findings: No erythema or rash.   Neurological:      Mental Status: Alert and oriented to person, place, and time.      Coordination: Coordination normal.   Psychiatric:         Attention and Perception: Attention normal.         Behavior: Behavior normal.         Thought Content: Thought content normal.         Cognition and Memory: Cognition normal.         Judgment: Judgment normal.               ECG 12 Lead    Date/Time: 12/8/2022 9:54 AM  Performed by: Gayle Simpson APRN  Authorized by: Gayle Simpson APRN   Comparison: compared with previous ECG from 4/4/2022  Similar to previous ECG  Rhythm: sinus rhythm  Rate: normal  Conduction: conduction normal  ST Segments: ST segments normal  T flattening: aVL  QRS axis: left    Clinical impression: non-specific ECG              Assessment:       Diagnosis Plan   1. Coronary artery disease involving native coronary artery of native heart without angina pectoris        2. STEMI involving oth coronary artery of anterior wall (HCC)        3. Primary hypertension        4. Dyslipidemia        5. Tobacco dependence               Plan:       1. STEMI on ECG with nonocclusive disease, likely due to  "vasospasm, is on calcium channel blocker, clopidogrel and aspirin. States he has some intermittent \"discomfort that has not been too bad\".  2. Essential HTN - well controlled  3. Dyslipidemia -continue lipid-lowering therapy.  He is currently on atorvastatin 40 mg daily..  4. Tobacco dependence -he smokes 2 packs of cigarettes daily.  Cessation advised however patient is not interested in stopping..  5. Occluded left carotid artery with 50-60% right carotid artery stenosis, follows with Dr. Tenorio.   He has an appointment next month.  6. Moderate bilateral lower extremity peripheral arterial disease    No changes, RTO in 6 months with RM    As always, it has been a pleasure to participate in your patient's care. Thank you.       Sincerely,       CASS Farooq      Current Outpatient Medications:   •  aspirin 81 MG EC tablet, Take 1 tablet by mouth Daily., Disp: 30 tablet, Rfl: 11  •  atorvastatin (LIPITOR) 40 MG tablet, TAKE ONE TABLET BY MOUTH DAILY, Disp: 90 tablet, Rfl: 3  •  clopidogrel (PLAVIX) 75 MG tablet, TAKE ONE TABLET BY MOUTH DAILY, Disp: 30 tablet, Rfl: 8  •  dilTIAZem CD (CARDIZEM CD) 300 MG 24 hr capsule, TAKE ONE CAPSULE BY MOUTH DAILY, Disp: 90 capsule, Rfl: 1  •  pantoprazole (PROTONIX) 40 MG EC tablet, TAKE ONE TABLET BY MOUTH DAILY, Disp: 90 tablet, Rfl: 2  •  sildenafil (VIAGRA) 50 MG tablet, TAKE ONE TABLET BY MOUTH DAILY AS NEEDED FOR ERECTILE DYSFUNCTION, Disp: 30 tablet, Rfl: 1      Dictated utilizing Dragon dictation        "

## 2022-12-14 RX ORDER — CLOPIDOGREL BISULFATE 75 MG/1
TABLET ORAL
Qty: 30 TABLET | Refills: 8 | Status: SHIPPED | OUTPATIENT
Start: 2022-12-14

## 2023-01-13 RX ORDER — DILTIAZEM HYDROCHLORIDE 300 MG/1
300 CAPSULE, COATED, EXTENDED RELEASE ORAL DAILY
Qty: 90 CAPSULE | Refills: 1 | Status: SHIPPED | OUTPATIENT
Start: 2023-01-13

## 2023-01-13 NOTE — TELEPHONE ENCOUNTER
Rx Refill Note  Requested Prescriptions      No prescriptions requested or ordered in this encounter      Last office visit with prescribing clinician: 12/8/2022 JF  Last telemedicine visit with prescribing clinician: 6/5/2023   Next office visit with prescribing clinician: 6/5/2023                         Would you like a call back once the refill request has been completed: [] Yes [] No    If the office needs to give you a call back, can they leave a voicemail: [] Yes [] No    Josey Garner MA  01/13/23, 11:34 EST

## 2023-01-16 ENCOUNTER — OFFICE VISIT (OUTPATIENT)
Dept: INTERNAL MEDICINE | Facility: CLINIC | Age: 68
End: 2023-01-16
Payer: MEDICARE

## 2023-01-16 VITALS
DIASTOLIC BLOOD PRESSURE: 78 MMHG | RESPIRATION RATE: 16 BRPM | OXYGEN SATURATION: 98 % | BODY MASS INDEX: 25.7 KG/M2 | HEIGHT: 71 IN | WEIGHT: 183.6 LBS | SYSTOLIC BLOOD PRESSURE: 126 MMHG | HEART RATE: 83 BPM | TEMPERATURE: 96.9 F

## 2023-01-16 DIAGNOSIS — I10 PRIMARY HYPERTENSION: Primary | ICD-10-CM

## 2023-01-16 DIAGNOSIS — R73.01 ELEVATED FASTING BLOOD SUGAR: ICD-10-CM

## 2023-01-16 DIAGNOSIS — M54.50 LUMBAR BACK PAIN: ICD-10-CM

## 2023-01-16 PROCEDURE — 99214 OFFICE O/P EST MOD 30 MIN: CPT | Performed by: INTERNAL MEDICINE

## 2023-01-16 RX ORDER — LOSARTAN POTASSIUM 25 MG/1
TABLET ORAL
COMMUNITY
Start: 2023-01-13

## 2023-01-17 ENCOUNTER — TELEPHONE (OUTPATIENT)
Dept: INTERNAL MEDICINE | Facility: CLINIC | Age: 68
End: 2023-01-17

## 2023-01-17 LAB
ALBUMIN SERPL-MCNC: 4.3 G/DL (ref 3.5–5.2)
ALBUMIN/GLOB SERPL: 1.5 G/DL
ALP SERPL-CCNC: 106 U/L (ref 39–117)
ALT SERPL-CCNC: 27 U/L (ref 1–41)
AST SERPL-CCNC: 29 U/L (ref 1–40)
BILIRUB SERPL-MCNC: 0.4 MG/DL (ref 0–1.2)
BUN SERPL-MCNC: 26 MG/DL (ref 8–23)
BUN/CREAT SERPL: 24.3 (ref 7–25)
CALCIUM SERPL-MCNC: 9.9 MG/DL (ref 8.6–10.5)
CHLORIDE SERPL-SCNC: 104 MMOL/L (ref 98–107)
CHOLEST SERPL-MCNC: 195 MG/DL (ref 0–200)
CHOLEST/HDLC SERPL: 3.9 {RATIO}
CO2 SERPL-SCNC: 26.7 MMOL/L (ref 22–29)
CREAT SERPL-MCNC: 1.07 MG/DL (ref 0.76–1.27)
EGFRCR SERPLBLD CKD-EPI 2021: 76.1 ML/MIN/1.73
GLOBULIN SER CALC-MCNC: 2.8 GM/DL
GLUCOSE SERPL-MCNC: 111 MG/DL (ref 65–99)
HBA1C MFR BLD: 6 % (ref 4.8–5.6)
HDLC SERPL-MCNC: 50 MG/DL (ref 40–60)
LDLC SERPL CALC-MCNC: 89 MG/DL (ref 0–100)
POTASSIUM SERPL-SCNC: 4.8 MMOL/L (ref 3.5–5.2)
PROT SERPL-MCNC: 7.1 G/DL (ref 6–8.5)
SODIUM SERPL-SCNC: 144 MMOL/L (ref 136–145)
TRIGL SERPL-MCNC: 343 MG/DL (ref 0–150)
VLDLC SERPL CALC-MCNC: 56 MG/DL (ref 5–40)

## 2023-01-17 NOTE — TELEPHONE ENCOUNTER
Caller: Sammy Roblero    Relationship: Self    Best call back number: [phone]    What was the call regarding: PATIENT STATED THAT HE HAD AN APPOINTMENT WITH [facility] AND HE WAS SUPPOSE TO SEND IN MEDICATIONS.    HE STATED THAT THE PHARMACY DOES NOT HAVE ANY MEDICATIONS.    HE WOULD LIKE A CALL REGARDING THIS.    Do you require a callback: YES

## 2023-01-18 ENCOUNTER — TELEPHONE (OUTPATIENT)
Dept: INTERNAL MEDICINE | Facility: CLINIC | Age: 68
End: 2023-01-18

## 2023-01-18 RX ORDER — PREGABALIN 75 MG/1
75 CAPSULE ORAL 2 TIMES DAILY
Qty: 60 CAPSULE | Refills: 0 | Status: SHIPPED | OUTPATIENT
Start: 2023-01-18

## 2023-01-18 NOTE — TELEPHONE ENCOUNTER
Caller: Sammy Roblero    Relationship: Self    Best call back number: [phone]    What is the medical concern/diagnosis: REFERRAL ORDER FOR AN MRI AND PAIN MANAGEMENT.    PLEASE ADVISE .

## 2023-01-19 NOTE — PROGRESS NOTES
"Chief Complaint  Med Refill and Back Pain (Would like referral to pain management)    Subjective        Sammy Roblero presents to [facility]  History of Present Illness  Here with low back pain, prseent for years, previously followed with pain management; has some radiating pain in legs at times, no weakness, no loss of urine or stools      Objective   Vital Signs:  /78   Pulse 83   Temp 96.9 °F (36.1 °C)   Resp 16   Ht 180.3 cm (71\")   Wt 83.3 kg (183 lb 9.6 oz)   SpO2 98%   BMI 25.61 kg/m²   Estimated body mass index is 25.61 kg/m² as calculated from the following:    Height as of this encounter: 180.3 cm (71\").    Weight as of this encounter: 83.3 kg (183 lb 9.6 oz).             Physical Exam  Vitals and nursing note reviewed.   Constitutional:       General: He is not in acute distress.     Appearance: Normal appearance.   HENT:      Head: Normocephalic and atraumatic.      Right Ear: External ear normal.      Left Ear: External ear normal.      Nose: Nose normal.      Mouth/Throat:      Mouth: Mucous membranes are moist.      Pharynx: Oropharynx is clear.   Eyes:      Extraocular Movements: Extraocular movements intact.      Conjunctiva/sclera: Conjunctivae normal.      Pupils: Pupils are equal, round, and reactive to light.   Cardiovascular:      Rate and Rhythm: Normal rate and regular rhythm.      Pulses: Normal pulses.      Heart sounds: Normal heart sounds. No murmur heard.    No gallop.   Pulmonary:      Effort: Pulmonary effort is normal.      Breath sounds: Normal breath sounds.   Abdominal:      General: Abdomen is flat. Bowel sounds are normal. There is no distension.      Palpations: Abdomen is soft. There is no mass.      Tenderness: There is no abdominal tenderness.   Musculoskeletal:         General: No swelling. Normal range of motion.      Cervical back: Normal range of motion and neck supple.   Skin:     General: Skin is warm and " dry.      Findings: No rash.   Neurological:      General: No focal deficit present.      Mental Status: He is alert and oriented to person, place, and time. Mental status is at baseline.   Psychiatric:         Mood and Affect: Mood normal.         Behavior: Behavior normal.        Result Review :                   Assessment and Plan   Diagnoses and all orders for this visit:    1. Primary hypertension (Primary)  -     Comprehensive Metabolic Panel  -     Lipid Panel With / Chol / HDL Ratio  -     Hemoglobin A1c    2. Elevated fasting blood sugar  -     Hemoglobin A1c    3. Lumbar back pain  -     pregabalin (Lyrica) 75 MG capsule; Take 1 capsule by mouth 2 (Two) Times a Day.  Dispense: 60 capsule; Refill: 0    - check labs as above  - htn, hld, at goal; continue meds  - start lyrica for chronic pain  - discussed risks/benefits/alternatives of meds/treatments  - rtc worsening, change in illness  - rtc 1 month         Follow Up   No follow-ups on file.  Patient was given instructions and counseling regarding his condition or for health maintenance advice. Please see specific information pulled into the AVS if appropriate.

## 2023-01-24 ENCOUNTER — TELEPHONE (OUTPATIENT)
Dept: INTERNAL MEDICINE | Facility: CLINIC | Age: 68
End: 2023-01-24
Payer: MEDICARE

## 2023-01-24 DIAGNOSIS — K21.9 GASTROESOPHAGEAL REFLUX DISEASE WITHOUT ESOPHAGITIS: ICD-10-CM

## 2023-01-24 DIAGNOSIS — N52.9 ERECTILE DYSFUNCTION, UNSPECIFIED ERECTILE DYSFUNCTION TYPE: ICD-10-CM

## 2023-01-24 RX ORDER — PANTOPRAZOLE SODIUM 40 MG/1
40 TABLET, DELAYED RELEASE ORAL DAILY
Qty: 90 TABLET | Refills: 2 | Status: SHIPPED | OUTPATIENT
Start: 2023-01-24

## 2023-01-25 RX ORDER — SILDENAFIL 50 MG/1
50 TABLET, FILM COATED ORAL DAILY PRN
Qty: 30 TABLET | Refills: 3 | Status: SHIPPED | OUTPATIENT
Start: 2023-01-25

## 2023-02-19 DIAGNOSIS — M54.50 LUMBAR BACK PAIN: ICD-10-CM

## 2023-02-19 RX ORDER — PREGABALIN 75 MG/1
CAPSULE ORAL
Qty: 60 CAPSULE | OUTPATIENT
Start: 2023-02-19

## 2023-02-27 RX ORDER — ATORVASTATIN CALCIUM 40 MG/1
TABLET, FILM COATED ORAL
Qty: 90 TABLET | Refills: 1 | Status: SHIPPED | OUTPATIENT
Start: 2023-02-27

## 2023-02-27 NOTE — TELEPHONE ENCOUNTER
"Nov-06/05/23-  LOV-12/08/22-    Plan:       1. STEMI on ECG with nonocclusive disease, likely due to vasospasm, is on calcium channel blocker, clopidogrel and aspirin. States he has some intermittent \"discomfort that has not been too bad\".  2. Essential HTN - well controlled  3. Dyslipidemia -continue lipid-lowering therapy.  He is currently on atorvastatin 40 mg daily..  4. Tobacco dependence -he smokes 2 packs of cigarettes daily.  Cessation advised however patient is not interested in stopping..  5. Occluded left carotid artery with 50-60% right carotid artery stenosis, follows with Dr. Tenorio.   He has an appointment next month.  6. Moderate bilateral lower extremity peripheral arterial disease     No changes, RTO in 6 months with        "

## 2023-04-12 RX ORDER — LOSARTAN POTASSIUM 25 MG/1
TABLET ORAL
Qty: 90 TABLET | OUTPATIENT
Start: 2023-04-12

## 2023-04-12 NOTE — TELEPHONE ENCOUNTER
Looks like this was Dc'd in Dec but looks like someone filled it in Jan.  I have called and spoken with the patient and he stated that he is not taking it and it is ok to decline.    Jaimee

## 2023-05-24 DIAGNOSIS — N52.9 ERECTILE DYSFUNCTION, UNSPECIFIED ERECTILE DYSFUNCTION TYPE: ICD-10-CM

## 2023-05-24 RX ORDER — SILDENAFIL 50 MG/1
TABLET, FILM COATED ORAL
Qty: 30 TABLET | Refills: 3 | Status: SHIPPED | OUTPATIENT
Start: 2023-05-24

## 2023-05-24 NOTE — TELEPHONE ENCOUNTER
Rx Refill Note  Requested Prescriptions     Pending Prescriptions Disp Refills   • sildenafil (VIAGRA) 50 MG tablet [Pharmacy Med Name: SILDENAFIL 50 MG TABLET] 30 tablet 3     Sig: TAKE ONE TABLET BY MOUTH DAILY AS NEEDED FOR  ERECTILE DYSFUNCTION      Last office visit with prescribing clinician: 1/16/2023   Last telemedicine visit with prescribing clinician: Visit date not found   Next office visit with prescribing clinician: Visit date not found                         Would you like a call back once the refill request has been completed: [] Yes [] No    If the office needs to give you a call back, can they leave a voicemail: [] Yes [] No    Flori Stokes MA  05/24/23, 08:38 EDT

## 2023-05-30 ENCOUNTER — OFFICE VISIT (OUTPATIENT)
Dept: INTERNAL MEDICINE | Facility: CLINIC | Age: 68
End: 2023-05-30

## 2023-05-30 VITALS
WEIGHT: 180 LBS | OXYGEN SATURATION: 95 % | DIASTOLIC BLOOD PRESSURE: 72 MMHG | SYSTOLIC BLOOD PRESSURE: 130 MMHG | HEIGHT: 71 IN | TEMPERATURE: 98.6 F | BODY MASS INDEX: 25.2 KG/M2 | RESPIRATION RATE: 18 BRPM | HEART RATE: 89 BPM

## 2023-05-30 DIAGNOSIS — M70.31 BURSITIS OF RIGHT ELBOW, UNSPECIFIED BURSA: Primary | ICD-10-CM

## 2023-05-30 PROCEDURE — 99213 OFFICE O/P EST LOW 20 MIN: CPT | Performed by: INTERNAL MEDICINE

## 2023-05-30 PROCEDURE — 3075F SYST BP GE 130 - 139MM HG: CPT | Performed by: INTERNAL MEDICINE

## 2023-05-30 PROCEDURE — 3078F DIAST BP <80 MM HG: CPT | Performed by: INTERNAL MEDICINE

## 2023-05-30 RX ORDER — CILOSTAZOL 50 MG/1
TABLET ORAL
COMMUNITY
Start: 2023-04-24 | End: 2023-05-30

## 2023-05-30 NOTE — PROGRESS NOTES
"Chief Complaint  Bursitis (Right elbow /)    Subjective        Sammy Roblero presents to [facility]  History of Present Illness    Sore and swollen elbow for 1 week. Cannot remember if he hit elbow recently but has had chronic soreness for years in the joint. No fevers or chills. Has not tried and OTC medicines such as ibuprofen or tylenol. Has never had swelling of the elbow before.    Objective   Vital Signs:  /72   Pulse 89   Temp 98.6 °F (37 °C)   Resp 18   Ht 180.3 cm (71\")   Wt 81.6 kg (180 lb)   SpO2 95%   BMI 25.10 kg/m²   Estimated body mass index is 25.1 kg/m² as calculated from the following:    Height as of this encounter: 180.3 cm (71\").    Weight as of this encounter: 81.6 kg (180 lb).             Physical Exam  Constitutional:       Appearance: Normal appearance.   HENT:      Mouth/Throat:      Mouth: Mucous membranes are moist.      Pharynx: Oropharynx is clear.   Eyes:      Conjunctiva/sclera: Conjunctivae normal.      Pupils: Pupils are equal, round, and reactive to light.   Cardiovascular:      Rate and Rhythm: Normal rate and regular rhythm.      Pulses: Normal pulses.   Pulmonary:      Effort: Pulmonary effort is normal.   Musculoskeletal:         General: Normal range of motion.      Comments: Swelling of right elbow present with mobile fluid pocket. No erythema but some TTP.   Neurological:      General: No focal deficit present.      Mental Status: He is alert.        Result Review :               Assessment and Plan   There are no diagnoses linked to this encounter.       Likely bursitis related to chronic arthritis. Low concern for infection or septic arthritis of the joint. Plan for observation and compression with OTC medication to decrease inflammation.    Follow Up   No follow-ups on file.  Patient was given instructions and counseling regarding his condition or for health maintenance advice. Please see specific information " pulled into the AVS if appropriate.

## 2023-09-11 ENCOUNTER — OFFICE VISIT (OUTPATIENT)
Dept: CARDIOLOGY | Facility: CLINIC | Age: 68
End: 2023-09-11
Payer: MEDICARE

## 2023-09-11 ENCOUNTER — LAB (OUTPATIENT)
Dept: LAB | Facility: HOSPITAL | Age: 68
End: 2023-09-11
Payer: MEDICARE

## 2023-09-11 VITALS
SYSTOLIC BLOOD PRESSURE: 168 MMHG | HEART RATE: 81 BPM | HEIGHT: 71 IN | DIASTOLIC BLOOD PRESSURE: 88 MMHG | WEIGHT: 174 LBS | BODY MASS INDEX: 24.36 KG/M2

## 2023-09-11 DIAGNOSIS — I25.10 CORONARY ARTERY DISEASE INVOLVING NATIVE CORONARY ARTERY OF NATIVE HEART WITHOUT ANGINA PECTORIS: Primary | ICD-10-CM

## 2023-09-11 DIAGNOSIS — F17.200 TOBACCO DEPENDENCE: ICD-10-CM

## 2023-09-11 DIAGNOSIS — I10 PRIMARY HYPERTENSION: ICD-10-CM

## 2023-09-11 DIAGNOSIS — I25.10 CORONARY ARTERY DISEASE INVOLVING NATIVE CORONARY ARTERY OF NATIVE HEART WITHOUT ANGINA PECTORIS: ICD-10-CM

## 2023-09-11 DIAGNOSIS — E78.5 DYSLIPIDEMIA: ICD-10-CM

## 2023-09-11 LAB
BASOPHILS # BLD AUTO: 0.06 10*3/MM3 (ref 0–0.2)
BASOPHILS NFR BLD AUTO: 0.9 % (ref 0–1.5)
DEPRECATED RDW RBC AUTO: 42 FL (ref 37–54)
EOSINOPHIL # BLD AUTO: 0.07 10*3/MM3 (ref 0–0.4)
EOSINOPHIL NFR BLD AUTO: 1 % (ref 0.3–6.2)
ERYTHROCYTE [DISTWIDTH] IN BLOOD BY AUTOMATED COUNT: 12.3 % (ref 12.3–15.4)
HCT VFR BLD AUTO: 47.9 % (ref 37.5–51)
HGB BLD-MCNC: 16.7 G/DL (ref 13–17.7)
IMM GRANULOCYTES # BLD AUTO: 0.04 10*3/MM3 (ref 0–0.05)
IMM GRANULOCYTES NFR BLD AUTO: 0.6 % (ref 0–0.5)
LYMPHOCYTES # BLD AUTO: 2.69 10*3/MM3 (ref 0.7–3.1)
LYMPHOCYTES NFR BLD AUTO: 38.2 % (ref 19.6–45.3)
MAGNESIUM SERPL-MCNC: 2.6 MG/DL (ref 1.6–2.4)
MCH RBC QN AUTO: 32.7 PG (ref 26.6–33)
MCHC RBC AUTO-ENTMCNC: 34.9 G/DL (ref 31.5–35.7)
MCV RBC AUTO: 93.7 FL (ref 79–97)
MONOCYTES # BLD AUTO: 0.48 10*3/MM3 (ref 0.1–0.9)
MONOCYTES NFR BLD AUTO: 6.8 % (ref 5–12)
NEUTROPHILS NFR BLD AUTO: 3.7 10*3/MM3 (ref 1.7–7)
NEUTROPHILS NFR BLD AUTO: 52.5 % (ref 42.7–76)
NRBC BLD AUTO-RTO: 0 /100 WBC (ref 0–0.2)
PLATELET # BLD AUTO: 284 10*3/MM3 (ref 140–450)
PMV BLD AUTO: 10.4 FL (ref 6–12)
RBC # BLD AUTO: 5.11 10*6/MM3 (ref 4.14–5.8)
TSH SERPL DL<=0.05 MIU/L-ACNC: 1 UIU/ML (ref 0.27–4.2)
URATE SERPL-MCNC: 6.8 MG/DL (ref 3.4–7)
WBC NRBC COR # BLD: 7.04 10*3/MM3 (ref 3.4–10.8)

## 2023-09-11 PROCEDURE — 36415 COLL VENOUS BLD VENIPUNCTURE: CPT

## 2023-09-11 PROCEDURE — 3077F SYST BP >= 140 MM HG: CPT | Performed by: INTERNAL MEDICINE

## 2023-09-11 PROCEDURE — 84550 ASSAY OF BLOOD/URIC ACID: CPT

## 2023-09-11 PROCEDURE — 93000 ELECTROCARDIOGRAM COMPLETE: CPT | Performed by: INTERNAL MEDICINE

## 2023-09-11 PROCEDURE — 84443 ASSAY THYROID STIM HORMONE: CPT

## 2023-09-11 PROCEDURE — 83735 ASSAY OF MAGNESIUM: CPT

## 2023-09-11 PROCEDURE — 99214 OFFICE O/P EST MOD 30 MIN: CPT | Performed by: INTERNAL MEDICINE

## 2023-09-11 PROCEDURE — 1160F RVW MEDS BY RX/DR IN RCRD: CPT | Performed by: INTERNAL MEDICINE

## 2023-09-11 PROCEDURE — 3079F DIAST BP 80-89 MM HG: CPT | Performed by: INTERNAL MEDICINE

## 2023-09-11 PROCEDURE — 85025 COMPLETE CBC W/AUTO DIFF WBC: CPT

## 2023-09-11 PROCEDURE — 1159F MED LIST DOCD IN RCRD: CPT | Performed by: INTERNAL MEDICINE

## 2023-09-11 RX ORDER — LOSARTAN POTASSIUM 50 MG/1
50 TABLET ORAL NIGHTLY
Qty: 90 TABLET | Refills: 3 | Status: SHIPPED | OUTPATIENT
Start: 2023-09-11

## 2023-09-11 NOTE — PROGRESS NOTES
Date of Office Visit: 2023  Encounter Provider: Darcy Martínez MD  Place of Service: [facility] CARDIOLOGY  Patient Name: Sammy Roblero  :1955      Patient ID:  Sammy Roblero is a 67 y.o. male is here for  followup for CAD.         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  He is here for CAD.    He presented 2020  with stuttering chest pain off and on since the day prior. [facility]'s EKG showed anterior ST elevation in V1 and 2 in 1 and aVL.  He was subsequently transferred to [facility] to the Cath Lab.  Angiography showed normal left main, 10% luminal LAD stenosis, 50 to 60% origin ramus stenosis, 10% luminal circumflex stenosis and 20 to 30% mid to distal RCA stenosis.  It was felt that his ST changes and chest pain may have been due to vasospasm and he was advised to stop smoking.  He was also started on calcium channel blocker and long-acting nitrates.  Echo at that time showed ejection fraction 66 to 70% with grade 1 diastolic dysfunction and mild concentric left ventricle hypertrophy.     He then represented to [facility] on 3/1/2020 with chest pain that felt like his prior admission. Labs on 3/1/2020 show first normal troponin, then 0.283, and now 0.165, glucose 125, otherwise normal CMP, HDL 32, triglycerides 423, normal CBC.  Blood pressure was 166/108.  ECG showed sinus rhythm with deep T wave inversions in the anterolateral leads.  This was no change from prior ECG done 2020.  ECG the following morning was  unchanged from prior.  Chest x-ray showed no active disease.  Repeat echocardiogram done 3/2/2020 LVEF 70% with asymmetric apical hypertrophy.     He continues to smoke 2 packs of cigarettes a day.  He saw Dr. Tenorio 2022 for follow-up for PAD and carotid disease.  His studies then showed 50-60% right  internal carotid stenosis and occluded left internal carotid artery.  His CHADWICK showed moderate bilateral lower extremity occlusive disease.  No medication changes were made.  He was advised stop smoking>     Labs on 1/16/2023 showed glucose 111, potassium 4.8, otherwise normal CMP, total cholesterol 195, triglycerides 343, HDL 50, LDL 89, VLDL 56.  He has no chest pain or pressure but does have exertional dyspnea.  He has no fevers or chills but has a chronic cough.  He smokes heavily.  He knows he needs to cut back.  He has no orthopnea or PND.        Past Medical History:   Diagnosis Date    Arthritis     COPD (chronic obstructive pulmonary disease)     Coronary artery disease     Elevated cholesterol     Hx of seasonal allergies     Hypertension          Past Surgical History:   Procedure Laterality Date    AMPUTATION FINGER / THUMB Left     CARDIAC CATHETERIZATION N/A 2/6/2020    Procedure: Left Heart Cath;  Surgeon: Darwin Chao MD;  Location: [facility] CATH INVASIVE LOCATION;  Service: Cardiovascular;  Laterality: N/A;    CARDIAC CATHETERIZATION N/A 2/6/2020    Procedure: Left ventriculography;  Surgeon: Darwin Chao MD;  Location: [facility] CATH INVASIVE LOCATION;  Service: Cardiovascular;  Laterality: N/A;    CARDIAC CATHETERIZATION N/A 2/6/2020    Procedure: Optical Coherent Tomography;  Surgeon: Darwin Chao MD;  Location: [facility] CATH INVASIVE LOCATION;  Service: Cardiovascular;  Laterality: N/A;    CARDIAC CATHETERIZATION N/A 2/6/2020    Procedure: Coronary angiography;  Surgeon: Darwin Chao MD;  Location: [facility] CATH INVASIVE LOCATION;  Service: Cardiovascular;  Laterality: N/A;    SINUS SURGERY         Current Outpatient Medications on File Prior to Visit   Medication Sig Dispense Refill    aspirin 81 MG EC tablet Take 1 tablet by mouth Daily. (Patient taking differently: Take 1 tablet by mouth Every Other Day.) 30 tablet 11    atorvastatin (LIPITOR) 40 MG tablet TAKE ONE TABLET BY MOUTH DAILY  "90 tablet 1    clopidogrel (PLAVIX) 75 MG tablet TAKE ONE TABLET BY MOUTH DAILY 30 tablet 8    dilTIAZem CD (CARDIZEM CD) 300 MG 24 hr capsule TAKE ONE CAPSULE BY MOUTH DAILY 90 capsule 0    pregabalin (Lyrica) 75 MG capsule Take 1 capsule by mouth 2 (Two) Times a Day. 60 capsule 0    sildenafil (VIAGRA) 50 MG tablet TAKE ONE TABLET BY MOUTH DAILY AS NEEDED FOR  ERECTILE DYSFUNCTION 30 tablet 3    [DISCONTINUED] losartan (COZAAR) 25 MG tablet       [DISCONTINUED] pantoprazole (PROTONIX) 40 MG EC tablet Take 1 tablet by mouth Daily. (Patient not taking: Reported on 9/11/2023) 90 tablet 2     No current facility-administered medications on file prior to visit.       Social History     Socioeconomic History    Marital status: Significant Other   Tobacco Use    Smoking status: Every Day     Packs/day: 2.00     Types: Cigarettes     Passive exposure: Current    Smokeless tobacco: Former     Types: Chew    Tobacco comments:     daily caffiene   Vaping Use    Vaping Use: Some days   Substance and Sexual Activity    Alcohol use: Yes     Alcohol/week: 3.0 standard drinks     Types: 3 Shots of liquor per week     Comment: once a week if that    Drug use: Not Currently    Sexual activity: Defer           ROS    Procedures    ECG 12 Lead    Date/Time: 9/11/2023 10:49 AM  Performed by: Darcy Martínez MD  Authorized by: Darcy Martínez MD   Comparison: compared with previous ECG   Similar to previous ECG  Rhythm: sinus rhythm  Q waves: I and aVL      Clinical impression: abnormal EKG            Objective:      Vitals:    09/11/23 1028   BP: 168/88   Pulse: 81   Weight: 78.9 kg (174 lb)   Height: 180.3 cm (71\")     Body mass index is 24.27 kg/m².    Vitals reviewed.   Constitutional:       General: Not in acute distress.     Appearance: Well-developed. Not diaphoretic.   Eyes:      General: No scleral icterus.     Conjunctiva/sclera: Conjunctivae normal.   HENT:      Head: Normocephalic and atraumatic.   Neck:      " Thyroid: No thyromegaly.      Vascular: No carotid bruit or JVD.      Lymphadenopathy: No cervical adenopathy.   Pulmonary:      Effort: Pulmonary effort is normal. No respiratory distress.      Breath sounds: Normal breath sounds. No wheezing. No rhonchi. No rales.   Chest:      Chest wall: Not tender to palpatation.   Cardiovascular:      Normal rate. Regular rhythm.      Murmurs: There is no murmur.      No gallop.  No rub.   Pulses:     Intact distal pulses.      Carotid: 2+ bilaterally.     Radial: 2+ bilaterally.     Dorsalis pedis: 2+ bilaterally.     Posterior tibial: 2+ bilaterally.  Edema:     Peripheral edema absent.   Abdominal:      General: Bowel sounds are normal. There is no distension or abdominal bruit.      Palpations: Abdomen is soft. There is no abdominal mass.      Tenderness: There is no abdominal tenderness.   Musculoskeletal:         General: No deformity.      Extremities: No clubbing present.     Cervical back: Neck supple. Skin:     General: Skin is warm and dry. There is no cyanosis.      Coloration: Skin is not pale.      Findings: No rash.   Neurological:      Mental Status: Alert and oriented to person, place, and time.      Cranial Nerves: No cranial nerve deficit.   Psychiatric:         Judgment: Judgment normal.       Lab Review:       Assessment:      Diagnosis Plan   1. Coronary artery disease involving native coronary artery of native heart without angina pectoris  CBC & Differential    Magnesium    Uric Acid    TSH    Adult Transthoracic Echo Complete W/ Cont if Necessary Per Protocol      2. Dyslipidemia  CBC & Differential    Magnesium    Uric Acid    TSH    Adult Transthoracic Echo Complete W/ Cont if Necessary Per Protocol      3. Primary hypertension  CBC & Differential    Magnesium    Uric Acid    TSH    Adult Transthoracic Echo Complete W/ Cont if Necessary Per Protocol      4. Tobacco dependence  Adult Transthoracic Echo Complete W/ Cont if Necessary Per Protocol         STEMI on ECG with nonocclusive disease, likely due to vasospasm, is on diltiazem, clopidogrel and aspirin.  Essential HTN - uncontrolled. BP goal <120/80.  Take losartan 50mg nightly.  Continue diltiazem.    Dyslipidemia - continue on lipid lowering therapy.  He is currently on atorvastatin.  Tobacco dependence -he smokes 2 packs of cigarettes daily.   Occluded left carotid artery with 50-60% right carotid artery stenosis, follows with Dr. Tenorio.   Moderate bilateral lower extremity peripheral arterial disease     Plan:       Will get records from vascular, check labs, set up echo and see shawnee in 1 year.  Increase losartan for bp control.  Advised smoking cessation.     Addendum: I did receive the records from [facility].  His right CHADWICK was 0.45 and left 0.55 done 5/23/2023.  This was a slight improvement from his prior studies.  He is seeing them again in November 2023.

## 2023-09-12 ENCOUNTER — TELEPHONE (OUTPATIENT)
Dept: CARDIOLOGY | Facility: CLINIC | Age: 68
End: 2023-09-12
Payer: MEDICARE

## 2023-09-12 NOTE — TELEPHONE ENCOUNTER
Notified patient of results. Patient verbalized understanding.    Carmen Clifford RN  Triage [facility]

## 2023-09-22 ENCOUNTER — HOSPITAL ENCOUNTER (OUTPATIENT)
Dept: CARDIOLOGY | Facility: HOSPITAL | Age: 68
Discharge: HOME | End: 2023-09-22
Payer: MEDICARE

## 2023-09-22 ENCOUNTER — TELEPHONE (OUTPATIENT)
Dept: CARDIOLOGY | Facility: CLINIC | Age: 68
End: 2023-09-22
Payer: MEDICARE

## 2023-09-22 VITALS — HEIGHT: 71 IN | BODY MASS INDEX: 24.36 KG/M2 | WEIGHT: 174 LBS

## 2023-09-22 DIAGNOSIS — I25.10 CORONARY ARTERY DISEASE INVOLVING NATIVE CORONARY ARTERY OF NATIVE HEART WITHOUT ANGINA PECTORIS: ICD-10-CM

## 2023-09-22 DIAGNOSIS — E78.5 DYSLIPIDEMIA: ICD-10-CM

## 2023-09-22 DIAGNOSIS — F17.200 TOBACCO DEPENDENCE: ICD-10-CM

## 2023-09-22 DIAGNOSIS — I10 PRIMARY HYPERTENSION: ICD-10-CM

## 2023-09-22 LAB
AORTIC DIMENSIONLESS INDEX: 0.9 (DI)
BH CV ECHO MEAS - AO MAX PG: 15 MMHG
BH CV ECHO MEAS - AO MEAN PG: 7.1 MMHG
BH CV ECHO MEAS - AO ROOT DIAM: 3 CM
BH CV ECHO MEAS - AO V2 MAX: 193.9 CM/SEC
BH CV ECHO MEAS - AO V2 VTI: 33.1 CM
BH CV ECHO MEAS - AVA(I,D): 2.47 CM2
BH CV ECHO MEAS - EDV(CUBED): 42.9 ML
BH CV ECHO MEAS - EDV(MOD-SP2): 71 ML
BH CV ECHO MEAS - EDV(MOD-SP4): 79 ML
BH CV ECHO MEAS - EF(MOD-BP): 66 %
BH CV ECHO MEAS - EF(MOD-SP2): 69 %
BH CV ECHO MEAS - EF(MOD-SP4): 65.8 %
BH CV ECHO MEAS - ESV(CUBED): 11.3 ML
BH CV ECHO MEAS - ESV(MOD-SP2): 22 ML
BH CV ECHO MEAS - ESV(MOD-SP4): 27 ML
BH CV ECHO MEAS - FS: 35.9 %
BH CV ECHO MEAS - IVS/LVPW: 1 CM
BH CV ECHO MEAS - IVSD: 1.1 CM
BH CV ECHO MEAS - LAT PEAK E' VEL: 12.3 CM/SEC
BH CV ECHO MEAS - LV DIASTOLIC VOL/BSA (35-75): 39.7 CM2
BH CV ECHO MEAS - LV MASS(C)D: 119 GRAMS
BH CV ECHO MEAS - LV MAX PG: 13.3 MMHG
BH CV ECHO MEAS - LV MEAN PG: 5.6 MMHG
BH CV ECHO MEAS - LV SYSTOLIC VOL/BSA (12-30): 13.6 CM2
BH CV ECHO MEAS - LV V1 MAX: 182.4 CM/SEC
BH CV ECHO MEAS - LV V1 VTI: 30.3 CM
BH CV ECHO MEAS - LVIDD: 3.5 CM
BH CV ECHO MEAS - LVIDS: 2.24 CM
BH CV ECHO MEAS - LVOT AREA: 2.7 CM2
BH CV ECHO MEAS - LVOT DIAM: 1.85 CM
BH CV ECHO MEAS - LVPWD: 1.1 CM
BH CV ECHO MEAS - MED PEAK E' VEL: 9.7 CM/SEC
BH CV ECHO MEAS - MR MAX PG: 25 MMHG
BH CV ECHO MEAS - MR MAX VEL: 249.9 CM/SEC
BH CV ECHO MEAS - MV A DUR: 0.1 SEC
BH CV ECHO MEAS - MV A MAX VEL: 123.9 CM/SEC
BH CV ECHO MEAS - MV DEC SLOPE: 446.9 CM/SEC2
BH CV ECHO MEAS - MV DEC TIME: 180 SEC
BH CV ECHO MEAS - MV E MAX VEL: 86.2 CM/SEC
BH CV ECHO MEAS - MV E/A: 0.7
BH CV ECHO MEAS - MV MAX PG: 5.9 MMHG
BH CV ECHO MEAS - MV MEAN PG: 2.38 MMHG
BH CV ECHO MEAS - MV P1/2T: 68.1 MSEC
BH CV ECHO MEAS - MV V2 VTI: 28.8 CM
BH CV ECHO MEAS - MVA(P1/2T): 3.2 CM2
BH CV ECHO MEAS - MVA(VTI): 2.8 CM2
BH CV ECHO MEAS - PA ACC TIME: 0.12 SEC
BH CV ECHO MEAS - PA V2 MAX: 132.9 CM/SEC
BH CV ECHO MEAS - PULM A REVS DUR: 0.11 SEC
BH CV ECHO MEAS - PULM A REVS VEL: 45.6 CM/SEC
BH CV ECHO MEAS - PULM DIAS VEL: 55 CM/SEC
BH CV ECHO MEAS - PULM S/D: 1.73
BH CV ECHO MEAS - PULM SYS VEL: 95.4 CM/SEC
BH CV ECHO MEAS - RAP SYSTOLE: 3 MMHG
BH CV ECHO MEAS - RV MAX PG: 4.5 MMHG
BH CV ECHO MEAS - RV V1 MAX: 105.9 CM/SEC
BH CV ECHO MEAS - RV V1 VTI: 19.3 CM
BH CV ECHO MEAS - RVSP: 27.2 MMHG
BH CV ECHO MEAS - SI(MOD-SP2): 24.7 ML/M2
BH CV ECHO MEAS - SI(MOD-SP4): 26.2 ML/M2
BH CV ECHO MEAS - SV(LVOT): 81.9 ML
BH CV ECHO MEAS - SV(MOD-SP2): 49 ML
BH CV ECHO MEAS - SV(MOD-SP4): 52 ML
BH CV ECHO MEAS - TAPSE (>1.6): 3.1 CM
BH CV ECHO MEAS - TR MAX PG: 24.2 MMHG
BH CV ECHO MEAS - TR MAX VEL: 246 CM/SEC
BH CV ECHO MEASUREMENTS AVERAGE E/E' RATIO: 7.84
BH CV XLRA - RV BASE: 3.7 CM
BH CV XLRA - TDI S': 23.1 CM/SEC
LEFT ATRIUM VOLUME INDEX: 21.7 ML/M2

## 2023-09-22 PROCEDURE — 93306 TTE W/DOPPLER COMPLETE: CPT

## 2023-09-22 NOTE — TELEPHONE ENCOUNTER
Notified patient of results, patient verbalizes understanding.    Davina Walton RN  [facility] Triage  09/22/23 12:45 EDT

## 2023-09-26 RX ORDER — CLOPIDOGREL BISULFATE 75 MG/1
75 TABLET ORAL DAILY
Qty: 90 TABLET | Refills: 3 | Status: SHIPPED | OUTPATIENT
Start: 2023-09-26

## 2023-10-10 ENCOUNTER — OFFICE VISIT (OUTPATIENT)
Dept: INTERNAL MEDICINE | Facility: CLINIC | Age: 68
End: 2023-10-10
Payer: MEDICARE

## 2023-10-10 VITALS
HEART RATE: 82 BPM | HEIGHT: 71 IN | DIASTOLIC BLOOD PRESSURE: 80 MMHG | OXYGEN SATURATION: 97 % | TEMPERATURE: 98 F | SYSTOLIC BLOOD PRESSURE: 110 MMHG | BODY MASS INDEX: 25.06 KG/M2 | WEIGHT: 179 LBS

## 2023-10-10 DIAGNOSIS — M79.675 TOE PAIN, LEFT: Primary | ICD-10-CM

## 2023-10-10 PROCEDURE — 3074F SYST BP LT 130 MM HG: CPT | Performed by: INTERNAL MEDICINE

## 2023-10-10 PROCEDURE — 3079F DIAST BP 80-89 MM HG: CPT | Performed by: INTERNAL MEDICINE

## 2023-10-10 PROCEDURE — 99213 OFFICE O/P EST LOW 20 MIN: CPT | Performed by: INTERNAL MEDICINE

## 2023-10-10 RX ORDER — CEPHALEXIN 500 MG/1
500 CAPSULE ORAL 4 TIMES DAILY
Qty: 28 CAPSULE | Refills: 0 | Status: SHIPPED | OUTPATIENT
Start: 2023-10-10 | End: 2023-10-17

## 2023-10-16 RX ORDER — DILTIAZEM HYDROCHLORIDE 300 MG/1
300 CAPSULE, COATED, EXTENDED RELEASE ORAL DAILY
Qty: 90 CAPSULE | Refills: 3 | Status: SHIPPED | OUTPATIENT
Start: 2023-10-16

## 2023-10-16 NOTE — PROGRESS NOTES
"Chief Complaint  Blister (On little toe of left foot)    Subjective        Sammy Roblero presents to [facility]  History of Present Illness  Here with wound in left lateral web space toes, felt his toes were splitting, mild redness, no fevers      Objective   Vital Signs:  /80   Pulse 82   Temp 98 øF (36.7 øC)   Ht 180.3 cm (71\")   Wt 81.2 kg (179 lb)   SpO2 97%   BMI 24.97 kg/mý   Estimated body mass index is 24.97 kg/mý as calculated from the following:    Height as of this encounter: 180.3 cm (71\").    Weight as of this encounter: 81.2 kg (179 lb).       BMI is within normal parameters. No other follow-up for BMI required.      Physical Exam  Constitutional:       Appearance: Normal appearance.   HENT:      Head: Normocephalic and atraumatic.      Right Ear: External ear normal.      Left Ear: External ear normal.      Nose: Nose normal.      Mouth/Throat:      Mouth: Mucous membranes are moist.   Eyes:      Extraocular Movements: Extraocular movements intact.      Conjunctiva/sclera: Conjunctivae normal.   Pulmonary:      Effort: Pulmonary effort is normal. No respiratory distress.   Musculoskeletal:         General: Normal range of motion.      Cervical back: Normal range of motion.      Comments: Mild erythema left toe webspace   Neurological:      General: No focal deficit present.      Mental Status: He is alert. Mental status is at baseline.   Psychiatric:         Mood and Affect: Mood normal.         Behavior: Behavior normal.         Thought Content: Thought content normal.         Judgment: Judgment normal.        Result Review :                   Assessment and Plan   Diagnoses and all orders for this visit:    1. Toe pain, left (Primary)  -     Ambulatory Referral to Podiatry    Other orders  -     cephalexin (Keflex) 500 MG capsule; Take 1 capsule by mouth 4 (Four) Times a Day for 7 days.  Dispense: 28 capsule; Refill: 0    - start " cephalexin  - discussed risks/benefits/alternatives of meds/treatments  - rtc regular follow up         Follow Up   No follow-ups on file.  Patient was given instructions and counseling regarding his condition or for health maintenance advice. Please see specific information pulled into the AVS if appropriate.

## 2023-11-10 ENCOUNTER — OFFICE VISIT (OUTPATIENT)
Dept: INTERNAL MEDICINE | Facility: CLINIC | Age: 68
End: 2023-11-10
Payer: MEDICARE

## 2023-11-10 VITALS
OXYGEN SATURATION: 92 % | WEIGHT: 187 LBS | BODY MASS INDEX: 26.18 KG/M2 | SYSTOLIC BLOOD PRESSURE: 138 MMHG | HEIGHT: 71 IN | HEART RATE: 82 BPM | DIASTOLIC BLOOD PRESSURE: 82 MMHG | TEMPERATURE: 98 F

## 2023-11-10 DIAGNOSIS — Z12.11 ENCOUNTER FOR SCREENING FOR MALIGNANT NEOPLASM OF COLON: ICD-10-CM

## 2023-11-10 DIAGNOSIS — Z87.891 PERSONAL HISTORY OF NICOTINE DEPENDENCE: ICD-10-CM

## 2023-11-10 DIAGNOSIS — Z00.00 WELL ADULT EXAM: Primary | ICD-10-CM

## 2023-11-10 DIAGNOSIS — Z11.4 ENCOUNTER FOR SCREENING FOR HIV: ICD-10-CM

## 2023-11-10 DIAGNOSIS — Z11.59 ENCOUNTER FOR HCV SCREENING TEST FOR LOW RISK PATIENT: ICD-10-CM

## 2023-11-10 DIAGNOSIS — Z72.0 TOBACCO ABUSE: ICD-10-CM

## 2023-11-10 DIAGNOSIS — Z12.5 PROSTATE CANCER SCREENING: ICD-10-CM

## 2023-11-10 RX ORDER — CILOSTAZOL 50 MG/1
TABLET ORAL
COMMUNITY
Start: 2023-10-21

## 2023-11-10 RX ORDER — NICOTINE 21 MG/24HR
1 PATCH, TRANSDERMAL 24 HOURS TRANSDERMAL EVERY 24 HOURS
Qty: 30 EACH | Refills: 6 | Status: SHIPPED | OUTPATIENT
Start: 2023-11-10

## 2023-11-11 LAB
ALBUMIN SERPL-MCNC: 4.1 G/DL (ref 3.9–4.9)
ALBUMIN/GLOB SERPL: 1.6 {RATIO} (ref 1.2–2.2)
ALP SERPL-CCNC: 99 IU/L (ref 44–121)
ALT SERPL-CCNC: 24 IU/L (ref 0–44)
AST SERPL-CCNC: 20 IU/L (ref 0–40)
BASOPHILS # BLD AUTO: 0.1 X10E3/UL (ref 0–0.2)
BASOPHILS NFR BLD AUTO: 1 %
BILIRUB SERPL-MCNC: 0.3 MG/DL (ref 0–1.2)
BUN SERPL-MCNC: 19 MG/DL (ref 8–27)
BUN/CREAT SERPL: 18 (ref 10–24)
CALCIUM SERPL-MCNC: 10.3 MG/DL (ref 8.6–10.2)
CHLORIDE SERPL-SCNC: 103 MMOL/L (ref 96–106)
CHOLEST SERPL-MCNC: 169 MG/DL (ref 100–199)
CHOLEST/HDLC SERPL: 4 RATIO (ref 0–5)
CO2 SERPL-SCNC: 23 MMOL/L (ref 20–29)
CREAT SERPL-MCNC: 1.04 MG/DL (ref 0.76–1.27)
EGFRCR SERPLBLD CKD-EPI 2021: 78 ML/MIN/1.73
EOSINOPHIL # BLD AUTO: 0.1 X10E3/UL (ref 0–0.4)
EOSINOPHIL NFR BLD AUTO: 2 %
ERYTHROCYTE [DISTWIDTH] IN BLOOD BY AUTOMATED COUNT: 12.9 % (ref 11.6–15.4)
GLOBULIN SER CALC-MCNC: 2.5 G/DL (ref 1.5–4.5)
GLUCOSE SERPL-MCNC: 115 MG/DL (ref 70–99)
HBA1C MFR BLD: 6.2 % (ref 4.8–5.6)
HCT VFR BLD AUTO: 38.4 % (ref 37.5–51)
HCV IGG SERPL QL IA: NON REACTIVE
HDLC SERPL-MCNC: 42 MG/DL
HGB BLD-MCNC: 13.3 G/DL (ref 13–17.7)
HIV 1+2 AB+HIV1 P24 AG SERPL QL IA: NON REACTIVE
IMM GRANULOCYTES # BLD AUTO: 0 X10E3/UL (ref 0–0.1)
IMM GRANULOCYTES NFR BLD AUTO: 0 %
LDLC SERPL CALC-MCNC: 65 MG/DL (ref 0–99)
LYMPHOCYTES # BLD AUTO: 1.8 X10E3/UL (ref 0.7–3.1)
LYMPHOCYTES NFR BLD AUTO: 29 %
MCH RBC QN AUTO: 32.7 PG (ref 26.6–33)
MCHC RBC AUTO-ENTMCNC: 34.6 G/DL (ref 31.5–35.7)
MCV RBC AUTO: 94 FL (ref 79–97)
MONOCYTES # BLD AUTO: 0.4 X10E3/UL (ref 0.1–0.9)
MONOCYTES NFR BLD AUTO: 6 %
NEUTROPHILS # BLD AUTO: 3.9 X10E3/UL (ref 1.4–7)
NEUTROPHILS NFR BLD AUTO: 62 %
PLATELET # BLD AUTO: 251 X10E3/UL (ref 150–450)
POTASSIUM SERPL-SCNC: 4 MMOL/L (ref 3.5–5.2)
PROT SERPL-MCNC: 6.6 G/DL (ref 6–8.5)
PSA SERPL-MCNC: 0.6 NG/ML (ref 0–4)
RBC # BLD AUTO: 4.07 X10E6/UL (ref 4.14–5.8)
SODIUM SERPL-SCNC: 142 MMOL/L (ref 134–144)
TRIGL SERPL-MCNC: 403 MG/DL (ref 0–149)
TSH SERPL DL<=0.005 MIU/L-ACNC: 1.83 UIU/ML (ref 0.45–4.5)
VLDLC SERPL CALC-MCNC: 62 MG/DL (ref 5–40)
WBC # BLD AUTO: 6.2 X10E3/UL (ref 3.4–10.8)

## 2023-11-12 NOTE — PROGRESS NOTES
The ABCs of the Annual Wellness Visit  Subsequent Medicare Wellness Visit    Subjective      Sammy Roblero is a 68 y.o. male who presents for a Subsequent Medicare Wellness Visit.    The following portions of the patient's history were reviewed and   updated as appropriate: allergies, current medications, past family history, past medical history, past social history, past surgical history, and problem list.    Compared to one year ago, the patient feels his physical   health is the same.    Compared to one year ago, the patient feels his mental   health is the same.    Recent Hospitalizations:  He was not admitted to the hospital during the last year.       Current Medical Providers:  Patient Care Team:  Drake De Jesus MD as PCP - General (Internal Medicine)    Outpatient Medications Prior to Visit   Medication Sig Dispense Refill    aspirin 81 MG EC tablet Take 1 tablet by mouth Daily. (Patient taking differently: Take 1 tablet by mouth Every Other Day.) 30 tablet 11    atorvastatin (LIPITOR) 40 MG tablet TAKE ONE TABLET BY MOUTH DAILY 90 tablet 1    cilostazol (PLETAL) 50 MG tablet       clopidogrel (PLAVIX) 75 MG tablet Take 1 tablet by mouth Daily. 90 tablet 3    dilTIAZem CD (CARDIZEM CD) 300 MG 24 hr capsule TAKE 1 CAPSULE BY MOUTH DAILY 90 capsule 3    losartan (Cozaar) 50 MG tablet Take 1 tablet by mouth Every Night. 90 tablet 3    pregabalin (Lyrica) 75 MG capsule Take 1 capsule by mouth 2 (Two) Times a Day. 60 capsule 0    sildenafil (VIAGRA) 50 MG tablet TAKE ONE TABLET BY MOUTH DAILY AS NEEDED FOR  ERECTILE DYSFUNCTION 30 tablet 3     No facility-administered medications prior to visit.       No opioid medication identified on active medication list. I have reviewed chart for other potential  high risk medication/s and harmful drug interactions in the elderly.        Aspirin is on active medication list. Aspirin use is not indicated based on review of current medical condition/s. Risk of harm  "outweighs potential benefits. Patient instructed to discontinue this medication.  .      Patient Active Problem List   Diagnosis    STEMI involving oth coronary artery of anterior wall    Chest pain    HTN (hypertension)    Tobacco dependence    Dyslipidemia    Coronary artery disease involving native coronary artery of native heart without angina pectoris     Advance Care Planning   Advance Care Planning     Advance Directive is not on file.  ACP discussion was held with the patient during this visit. Patient does not have an advance directive, information provided.     Objective    Vitals:    11/10/23 0933   BP: 138/82   Pulse: 82   Temp: 98 °F (36.7 °C)   SpO2: 92%   Weight: 84.8 kg (187 lb)   Height: 180.3 cm (71\")     Estimated body mass index is 26.08 kg/m² as calculated from the following:    Height as of this encounter: 180.3 cm (71\").    Weight as of this encounter: 84.8 kg (187 lb).    BMI is >= 25 and <30. (Overweight) The following options were offered after discussion;: exercise counseling/recommendations and nutrition counseling/recommendations      Does the patient have evidence of cognitive impairment?   No    Lab Results   Component Value Date    CHLPL 169 11/10/2023    TRIG 403 (H) 11/10/2023    HDL 42 11/10/2023    LDL 65 11/10/2023    VLDL 62 (H) 11/10/2023    HGBA1C 6.2 (H) 11/10/2023          HEALTH RISK ASSESSMENT    Smoking Status:  Social History     Tobacco Use   Smoking Status Every Day    Packs/day: 2    Types: Cigarettes    Passive exposure: Current   Smokeless Tobacco Former    Types: Chew   Tobacco Comments    daily caffiene     Alcohol Consumption:  Social History     Substance and Sexual Activity   Alcohol Use Yes    Alcohol/week: 3.0 standard drinks of alcohol    Types: 3 Shots of liquor per week    Comment: once a week if that     Fall Risk Screen:    TAVIAADI Fall Risk Assessment has not been completed.    Depression Screening:       No data to display                Health Habits " and Functional and Cognitive Screening:       No data to display                Age-appropriate Screening Schedule:  Refer to the list below for future screening recommendations based on patient's age, sex and/or medical conditions. Orders for these recommended tests are listed in the plan section. The patient has been provided with a written plan.    Health Maintenance   Topic Date Due    COLORECTAL CANCER SCREENING  Never done    COVID-19 Vaccine (1) Never done    ZOSTER VACCINE (1 of 2) Never done    TDAP/TD VACCINES (2 - Tdap) 09/21/2011    ANNUAL WELLNESS VISIT  Never done    Pneumococcal Vaccine 65+ (2 - PCV) 10/02/2021    BMI FOLLOWUP  07/26/2022    AAA SCREEN (ONE-TIME)  Never done    INFLUENZA VACCINE  08/01/2023    LIPID PANEL  11/10/2024    HEPATITIS C SCREENING  Completed                  CMS Preventative Services Quick Reference  Risk Factors Identified During Encounter:    Tobacco Use/Dependance Risk (use dotphrase .tobaccocessation for documentation)    The above risks/problems have been discussed with the patient.  Pertinent information has been shared with the patient in the After Visit Summary.    Diagnoses and all orders for this visit:    1. Well adult exam (Primary)  -     CBC & Differential  -     Comprehensive Metabolic Panel  -     Lipid Panel With / Chol / HDL Ratio  -     TSH  -     Hemoglobin A1c    2. Encounter for screening for malignant neoplasm of colon  -     Ambulatory Referral For Screening Colonoscopy    3. Tobacco abuse  -     US AAA Screen Limited; Future  -     CT Chest Low Dose Wo; Future    4. Encounter for screening for HIV  -     HIV-1 / O / 2 Ag / Antibody    5. Encounter for HCV screening test for low risk patient  -     Hepatitis C Antibody    6. Prostate cancer screening  -     PSA Screen    7. Personal history of nicotine dependence  -     CT Chest Low Dose Wo; Future    Other orders  -     nicotine (NICODERM CQ) 21 MG/24HR patch; Place 1 patch on the skin as directed  by provider Daily.  Dispense: 30 each; Refill: 6        Follow Up:   Next Medicare Wellness visit to be scheduled in 1 year.      An After Visit Summary and PPPS were made available to the patient.

## 2023-11-12 NOTE — PROGRESS NOTES
"Chief Complaint   Patient presents with    Annual Exam       Subjective   Sammy Roblero is a 68 y.o. male.     History of Present Illness  Wants to quit smoking, has tried patches in past       The following portions of the patient's history were reviewed and updated as appropriate: allergies, current medications, past family history, past medical history, past social history, past surgical history, and problem list.    Review of Systems      Objective   Body mass index is 26.08 kg/m².   Vitals:    11/10/23 0933   BP: 138/82   Pulse: 82   Temp: 98 °F (36.7 °C)   SpO2: 92%   Weight: 84.8 kg (187 lb)   Height: 180.3 cm (71\")        Physical Exam  Vitals and nursing note reviewed.   Constitutional:       General: He is not in acute distress.     Appearance: Normal appearance.   HENT:      Head: Normocephalic and atraumatic.      Right Ear: External ear normal.      Left Ear: External ear normal.      Nose: Nose normal.      Mouth/Throat:      Mouth: Mucous membranes are moist.      Pharynx: Oropharynx is clear.   Eyes:      Extraocular Movements: Extraocular movements intact.      Conjunctiva/sclera: Conjunctivae normal.      Pupils: Pupils are equal, round, and reactive to light.   Cardiovascular:      Rate and Rhythm: Normal rate and regular rhythm.      Pulses: Normal pulses.      Heart sounds: Normal heart sounds. No murmur heard.     No gallop.   Pulmonary:      Effort: Pulmonary effort is normal.      Breath sounds: Normal breath sounds.   Abdominal:      General: Abdomen is flat. Bowel sounds are normal. There is no distension.      Palpations: Abdomen is soft. There is no mass.      Tenderness: There is no abdominal tenderness.   Musculoskeletal:         General: No swelling. Normal range of motion.      Cervical back: Normal range of motion and neck supple.   Skin:     General: Skin is warm and dry.      Findings: No rash.   Neurological:      General: No focal deficit present.      Mental Status: He is " alert and oriented to person, place, and time. Mental status is at baseline.   Psychiatric:         Mood and Affect: Mood normal.         Behavior: Behavior normal.           Current Outpatient Medications:     aspirin 81 MG EC tablet, Take 1 tablet by mouth Daily. (Patient taking differently: Take 1 tablet by mouth Every Other Day.), Disp: 30 tablet, Rfl: 11    atorvastatin (LIPITOR) 40 MG tablet, TAKE ONE TABLET BY MOUTH DAILY, Disp: 90 tablet, Rfl: 1    cilostazol (PLETAL) 50 MG tablet, , Disp: , Rfl:     clopidogrel (PLAVIX) 75 MG tablet, Take 1 tablet by mouth Daily., Disp: 90 tablet, Rfl: 3    dilTIAZem CD (CARDIZEM CD) 300 MG 24 hr capsule, TAKE 1 CAPSULE BY MOUTH DAILY, Disp: 90 capsule, Rfl: 3    losartan (Cozaar) 50 MG tablet, Take 1 tablet by mouth Every Night., Disp: 90 tablet, Rfl: 3    pregabalin (Lyrica) 75 MG capsule, Take 1 capsule by mouth 2 (Two) Times a Day., Disp: 60 capsule, Rfl: 0    sildenafil (VIAGRA) 50 MG tablet, TAKE ONE TABLET BY MOUTH DAILY AS NEEDED FOR  ERECTILE DYSFUNCTION, Disp: 30 tablet, Rfl: 3    nicotine (NICODERM CQ) 21 MG/24HR patch, Place 1 patch on the skin as directed by provider Daily., Disp: 30 each, Rfl: 6     Lab Results   Component Value Date    HGBA1C 6.2 (H) 11/10/2023    TSH 1.830 11/10/2023    PSA 0.6 11/10/2023        Health Maintenance   Topic Date Due    COLORECTAL CANCER SCREENING  Never done    COVID-19 Vaccine (1) Never done    ZOSTER VACCINE (1 of 2) Never done    TDAP/TD VACCINES (2 - Tdap) 09/21/2011    ANNUAL WELLNESS VISIT  Never done    Pneumococcal Vaccine 65+ (2 - PCV) 10/02/2021    BMI FOLLOWUP  07/26/2022    AAA SCREEN (ONE-TIME)  Never done    INFLUENZA VACCINE  08/01/2023    LIPID PANEL  11/10/2024    HEPATITIS C SCREENING  Completed        Immunization History   Administered Date(s) Administered    Fluzone (or Fluarix & Flulaval for VFC) >6mos 02/07/2020    Influenza Quad Vaccine (Inpatient) 09/14/2013, 09/04/2014    Influenza Seasonal Injectable  01/31/2017    Pneumococcal Polysaccharide (PPSV23) 10/01/2011, 09/04/2014, 10/02/2020    Td (TDVAX) 09/21/2001       Assessment & Plan   Diagnoses and all orders for this visit:    1. Well adult exam (Primary)  -     CBC & Differential  -     Comprehensive Metabolic Panel  -     Lipid Panel With / Chol / HDL Ratio  -     TSH  -     Hemoglobin A1c    2. Encounter for screening for malignant neoplasm of colon  -     Ambulatory Referral For Screening Colonoscopy    3. Tobacco abuse  -     US AAA Screen Limited; Future  -     CT Chest Low Dose Wo; Future    4. Encounter for screening for HIV  -     HIV-1 / O / 2 Ag / Antibody    5. Encounter for HCV screening test for low risk patient  -     Hepatitis C Antibody    6. Prostate cancer screening  -     PSA Screen    7. Personal history of nicotine dependence  -     CT Chest Low Dose Wo; Future    Other orders  -     nicotine (NICODERM CQ) 21 MG/24HR patch; Place 1 patch on the skin as directed by provider Daily.  Dispense: 30 each; Refill: 6    - start nicotine patches, counseled       Well adult exam  - labs checked and evaluated    Colonoscopy - due now  Prostate - screening  Glaucoma - counseled  AAA - screening  Lung cancer - yes, screening  HIV - checking  HCV - checking  DM - checking  HLD - checking  Smoking - yes, counseled, patches  Depression - no  Vaccines - counseled, not interested  Falls - no  Alcohol Screening - no    Discussed mental health, sexual health, substance use, abuse, anticipatory guidance given.      No follow-ups on file.     Drake De Jesus MD  [facility] [place]  Internal Medicine and Pediatrics  Phone: [phone]  Fax: [phone]

## 2023-11-17 ENCOUNTER — TELEPHONE (OUTPATIENT)
Dept: INTERNAL MEDICINE | Facility: CLINIC | Age: 68
End: 2023-11-17
Payer: MEDICARE

## 2023-11-17 NOTE — TELEPHONE ENCOUNTER
Caller: Sammy Roblero    Relationship to patient: Self    Best call back number:     Patient is needing: PATIENT WANTING A CALL BACK WITH STATUS ON THE REFERRALS HE WAS SUPPOSED TO HAVE FOLLOWING HIS PHYSICAL.  HE STATES HE IS TO HAVE A CHEST X RAY, BE REFERRED TO A FOOT DOCTOR AND HAVE AN ORDER FOR A COLONOSCOPY.   PLEASE ADVISE PATIENT.

## 2023-11-30 RX ORDER — ATORVASTATIN CALCIUM 40 MG/1
40 TABLET, FILM COATED ORAL DAILY
Qty: 90 TABLET | Refills: 3 | Status: SHIPPED | OUTPATIENT
Start: 2023-11-30

## 2023-12-11 ENCOUNTER — HOSPITAL ENCOUNTER (OUTPATIENT)
Dept: CT IMAGING | Facility: HOSPITAL | Age: 68
Discharge: HOME | End: 2023-12-11
Payer: MEDICARE

## 2023-12-11 ENCOUNTER — HOSPITAL ENCOUNTER (OUTPATIENT)
Dept: ULTRASOUND IMAGING | Facility: HOSPITAL | Age: 68
Discharge: HOME | End: 2023-12-11
Payer: MEDICARE

## 2023-12-11 DIAGNOSIS — Z72.0 TOBACCO ABUSE: ICD-10-CM

## 2023-12-11 DIAGNOSIS — Z87.891 PERSONAL HISTORY OF NICOTINE DEPENDENCE: ICD-10-CM

## 2023-12-11 PROCEDURE — 71271 CT THORAX LUNG CANCER SCR C-: CPT

## 2023-12-11 PROCEDURE — 76706 US ABDL AORTA SCREEN AAA: CPT

## 2023-12-29 ENCOUNTER — TELEPHONE (OUTPATIENT)
Dept: INTERNAL MEDICINE | Facility: CLINIC | Age: 68
End: 2023-12-29
Payer: MEDICARE

## 2024-02-16 ENCOUNTER — HOSPITAL ENCOUNTER (EMERGENCY)
Facility: HOSPITAL | Age: 69
Discharge: HOME | End: 2024-02-16
Attending: EMERGENCY MEDICINE
Payer: MEDICARE

## 2024-02-16 VITALS
HEIGHT: 71 IN | DIASTOLIC BLOOD PRESSURE: 82 MMHG | HEART RATE: 102 BPM | SYSTOLIC BLOOD PRESSURE: 152 MMHG | OXYGEN SATURATION: 99 % | BODY MASS INDEX: 25.2 KG/M2 | RESPIRATION RATE: 18 BRPM | WEIGHT: 180 LBS | TEMPERATURE: 98.3 F

## 2024-02-16 DIAGNOSIS — J40 BRONCHITIS: Primary | ICD-10-CM

## 2024-02-16 DIAGNOSIS — J10.1 INFLUENZA A: ICD-10-CM

## 2024-02-16 LAB
FLUAV SUBTYP SPEC NAA+PROBE: DETECTED
FLUBV RNA ISLT QL NAA+PROBE: NOT DETECTED
S PYO AG THROAT QL: NEGATIVE
SARS-COV-2 RNA RESP QL NAA+PROBE: NOT DETECTED

## 2024-02-16 PROCEDURE — 87081 CULTURE SCREEN ONLY: CPT | Performed by: EMERGENCY MEDICINE

## 2024-02-16 PROCEDURE — 87636 SARSCOV2 & INF A&B AMP PRB: CPT | Performed by: EMERGENCY MEDICINE

## 2024-02-16 PROCEDURE — 87880 STREP A ASSAY W/OPTIC: CPT | Performed by: EMERGENCY MEDICINE

## 2024-02-16 PROCEDURE — 99283 EMERGENCY DEPT VISIT LOW MDM: CPT

## 2024-02-16 PROCEDURE — 63710000001 PREDNISONE PER 1 MG: Performed by: EMERGENCY MEDICINE

## 2024-02-16 RX ORDER — PREDNISONE 20 MG/1
60 TABLET ORAL ONCE
Status: COMPLETED | OUTPATIENT
Start: 2024-02-16 | End: 2024-02-16

## 2024-02-16 RX ORDER — DEXTROMETHORPHAN HYDROBROMIDE AND PROMETHAZINE HYDROCHLORIDE 15; 6.25 MG/5ML; MG/5ML
5 SYRUP ORAL 4 TIMES DAILY PRN
Qty: 100 ML | Refills: 0 | Status: SHIPPED | OUTPATIENT
Start: 2024-02-16

## 2024-02-16 RX ADMIN — PREDNISONE 60 MG: 20 TABLET ORAL at 12:14

## 2024-02-16 NOTE — ED PROVIDER NOTES
Subjective   History of Present Illness  Patient presents complaining of a 2-day history of increased productive cough with greenish sputum and chest congestion.  Patient says started gradually and has been somewhat constant since.  Patient denies any recent travel, sick contacts or bad food exposure, or trauma.  Patient does have a history of having allergy issues but does not take any medicine for.  Patient is a avid smoker.  Patient is not taking any therapy prior to arrival.  Patient says he gets infection a few times a year.  Patient denies any bloody sputum but says he is congested in his sinuses.  Patient denies any fever, vision changes, ringing in his ears, and no difficulty eating or drinking.      Review of Systems   All other systems reviewed and are negative.      Past Medical History:   Diagnosis Date    Arthritis     COPD (chronic obstructive pulmonary disease)     Coronary artery disease     Elevated cholesterol     Hx of seasonal allergies     Hypertension        No Known Allergies    Past Surgical History:   Procedure Laterality Date    AMPUTATION FINGER / THUMB Left     CARDIAC CATHETERIZATION N/A 2/6/2020    Procedure: Left Heart Cath;  Surgeon: Darwin Chao MD;  Location: [facility] INVASIVE LOCATION;  Service: Cardiovascular;  Laterality: N/A;    CARDIAC CATHETERIZATION N/A 2/6/2020    Procedure: Left ventriculography;  Surgeon: Darwin Chao MD;  Location: [facility] INVASIVE LOCATION;  Service: Cardiovascular;  Laterality: N/A;    CARDIAC CATHETERIZATION N/A 2/6/2020    Procedure: Optical Coherent Tomography;  Surgeon: Darwin Chao MD;  Location: [facility] INVASIVE LOCATION;  Service: Cardiovascular;  Laterality: N/A;    CARDIAC CATHETERIZATION N/A 2/6/2020    Procedure: Coronary angiography;  Surgeon: Darwin Chao MD;  Location: [facility] INVASIVE LOCATION;  Service: Cardiovascular;  Laterality: N/A;    SINUS SURGERY         History reviewed. No pertinent family  history.    Social History     Socioeconomic History    Marital status: Significant Other   Tobacco Use    Smoking status: Every Day     Packs/day: 2     Types: Cigarettes     Passive exposure: Current    Smokeless tobacco: Former     Types: Chew    Tobacco comments:     daily caffiene   Vaping Use    Vaping Use: Some days   Substance and Sexual Activity    Alcohol use: Yes     Alcohol/week: 3.0 standard drinks of alcohol     Types: 3 Shots of liquor per week     Comment: once a week if that    Drug use: Not Currently    Sexual activity: Defer           Objective   Physical Exam  Vitals and nursing note reviewed.   Constitutional:       Appearance: He is well-developed.      Comments: Patient sitting in bed comfortably, friendly, conversive.   HENT:      Head: Normocephalic.      Nose: Congestion and rhinorrhea present.      Comments: Thin clear nasal discharge.     Mouth/Throat:      Mouth: Mucous membranes are moist. No oral lesions.      Pharynx: Oropharynx is clear. No oropharyngeal exudate.   Eyes:      Conjunctiva/sclera: Conjunctivae normal.   Cardiovascular:      Rate and Rhythm: Normal rate and regular rhythm.      Heart sounds: Normal heart sounds.   Pulmonary:      Effort: Pulmonary effort is normal.      Breath sounds: Normal breath sounds. No stridor.   Musculoskeletal:      Cervical back: Neck supple.   Lymphadenopathy:      Cervical: No cervical adenopathy.   Skin:     General: Skin is warm and dry.      Capillary Refill: Capillary refill takes 2 to 3 seconds.   Neurological:      Mental Status: He is alert and oriented to person, place, and time.   Psychiatric:         Mood and Affect: Mood normal.         Behavior: Behavior normal.         Procedures           ED Course                                             Medical Decision Making  Ddx sinusitis, rhinitis, allergies, viral infection, upper respiratory infection, bronchitis, laryngitis, pharyngitis, pneumonia    Labs Reviewed  COVID-19 AND FLU  A/B, NP SWAB IN TRANSPORT MEDIA 1 HR TAT - Abnormal; Notable for the following components:     Influenza A PCR               Detected (*)            All other components within normal limits         Narrative: Fact sheet for providers: https://www.fda.gov/media/827569/download                                    Fact sheet for patients: https://www.fda.gov/media/734363/download                                    Test performed by PCR.  RAPID STREP A SCREEN - Normal  COVID PRE-OP / PRE-PROCEDURE SCREENING ORDER (NO ISOLATION)         Narrative: The following orders were created for panel order COVID PRE-OP / PRE-PROCEDURE SCREENING ORDER (NO ISOLATION) - Swab, Nasopharynx.                  Procedure                               Abnormality         Status                                     ---------                               -----------         ------                                     COVID-19 and FLU A/B PCR...[[id number]]  Abnormal            Final result                                                 Please view results for these tests on the individual orders.  BETA HEMOLYTIC STREP CULTURE, THROAT    1205 Pt seen again prior to d/c.  Labs reviewed and are remarkable for flu A.  Vitals stable and pt. in NAD. Non-toxic. Comfortable. Ambulating without difficulty.  Tolerating po.  Relaxed breathing.  All questions personally answered at the bedside and all d/c instructions personally reviewed with pt.  Discussed the importance of close outpt. f/u and pt. understands this and agrees to do so.  Pt agrees to return to ED immediately for any new, persistent, or worsening symptoms.    EMR Dragon/Transcription disclaimer:  Much of this encounter note is an electronic transcription/translation of spoken language to printed text, aka voice recognition.  The electronic translation of spoken language may permit erroneous or at times nonsensical words or phrases to be inadvertently transcribed; although I have reviewed  the note for such errors, some may still exist so please interpret based on surrounding text content.          Final diagnoses:   Bronchitis   Influenza A       ED Disposition  ED Disposition       ED Disposition   Discharge    Condition   Stable    Comment   --               Drake De Jesus MD  [address] [address] [phone]    In 3 days           Medication List        New Prescriptions      promethazine-dextromethorphan 6.25-15 MG/5ML syrup  Commonly known as: PROMETHAZINE-DM  Take 5 mL by mouth 4 (Four) Times a Day As Needed for Cough.            Changed      aspirin 81 MG EC tablet  Take 1 tablet by mouth Daily.  What changed: when to take this               Where to Get Your Medications        These medications were sent to [facility] [id number] - [place] - [phone] [facility] [phone] [address] [address]      Phone: [phone]   promethazine-dextromethorphan 6.25-15 MG/5ML syrup            Can Perry MD  02/16/24 [id number]

## 2024-02-18 LAB — BACTERIA SPEC AEROBE CULT: NORMAL

## 2024-03-07 ENCOUNTER — OFFICE VISIT (OUTPATIENT)
Dept: INTERNAL MEDICINE | Facility: CLINIC | Age: 69
End: 2024-03-07
Payer: MEDICARE

## 2024-03-07 VITALS
DIASTOLIC BLOOD PRESSURE: 62 MMHG | HEIGHT: 71 IN | BODY MASS INDEX: 25.9 KG/M2 | SYSTOLIC BLOOD PRESSURE: 104 MMHG | WEIGHT: 185 LBS | HEART RATE: 64 BPM | RESPIRATION RATE: 20 BRPM | OXYGEN SATURATION: 94 %

## 2024-03-07 DIAGNOSIS — R05.1 ACUTE COUGH: Primary | ICD-10-CM

## 2024-03-07 DIAGNOSIS — J40 BRONCHITIS: ICD-10-CM

## 2024-03-07 DIAGNOSIS — K20.90 ESOPHAGITIS: ICD-10-CM

## 2024-03-07 PROBLEM — M50.20 CERVICAL DISC DISPLACEMENT: Status: ACTIVE | Noted: 2024-03-07

## 2024-03-07 PROCEDURE — 3078F DIAST BP <80 MM HG: CPT | Performed by: STUDENT IN AN ORGANIZED HEALTH CARE EDUCATION/TRAINING PROGRAM

## 2024-03-07 PROCEDURE — 99214 OFFICE O/P EST MOD 30 MIN: CPT | Performed by: STUDENT IN AN ORGANIZED HEALTH CARE EDUCATION/TRAINING PROGRAM

## 2024-03-07 PROCEDURE — 1159F MED LIST DOCD IN RCRD: CPT | Performed by: STUDENT IN AN ORGANIZED HEALTH CARE EDUCATION/TRAINING PROGRAM

## 2024-03-07 PROCEDURE — 1160F RVW MEDS BY RX/DR IN RCRD: CPT | Performed by: STUDENT IN AN ORGANIZED HEALTH CARE EDUCATION/TRAINING PROGRAM

## 2024-03-07 PROCEDURE — 3074F SYST BP LT 130 MM HG: CPT | Performed by: STUDENT IN AN ORGANIZED HEALTH CARE EDUCATION/TRAINING PROGRAM

## 2024-03-07 RX ORDER — PREDNISONE 20 MG/1
TABLET ORAL
Qty: 19 TABLET | Refills: 0 | Status: SHIPPED | OUTPATIENT
Start: 2024-03-07 | End: 2024-03-17

## 2024-03-07 RX ORDER — CEFDINIR 300 MG/1
300 CAPSULE ORAL 2 TIMES DAILY
Qty: 14 CAPSULE | Refills: 0 | Status: SHIPPED | OUTPATIENT
Start: 2024-03-07 | End: 2024-03-14

## 2024-03-07 RX ORDER — GUAIFENESIN AND DEXTROMETHORPHAN HYDROBROMIDE 600; 30 MG/1; MG/1
1 TABLET, EXTENDED RELEASE ORAL 2 TIMES DAILY PRN
Qty: 10 TABLET | Refills: 0 | Status: SHIPPED | OUTPATIENT
Start: 2024-03-07

## 2024-03-07 NOTE — PROGRESS NOTES
Toi Umana,   [facility]  [address] [place] [address] [address]  [place] [address] [phone]    Subjective      Name Sammy Roblero MRN [id number]    1955 AGE/SEX 68 y.o. / male      Chief Complaint Chief Complaint   Patient presents with    Hospital Follow Up Visit     Here to establish care and have a ED follow up for flu x 3 weeks ago. Still has ear fullness and cough         Visit History for  2024    History of Present Illness  Sammy Roblero is a 68 y.o. male who presented today for Hospital Follow Up Visit (Here to establish care and have a ED follow up for flu x 3 weeks ago. Still has ear fullness and cough)  .    Still having more coughing and having congestion as well.  Feels a little better but still having productive cough.  Continues to have sinus pressure, productive cough with yellow mucus, and sore throat.    Longstanding history of smoking.  He continues to smoke at least 2 packs a day.  Unclear of diagnosis of COPD in the past.  Low-dose CT scan was completed late last year.      Medications and Allergies   Current Outpatient Medications   Medication Instructions    aspirin 81 mg, Oral, Daily    atorvastatin (LIPITOR) 40 mg, Oral, Daily    cefdinir (OMNICEF) 300 mg, Oral, 2 Times Daily    cilostazol (PLETAL) 50 MG tablet     clopidogrel (PLAVIX) 75 mg, Oral, Daily    dilTIAZem CD (CARDIZEM CD) 300 mg, Oral, Daily    guaifenesin-dextromethorphan 600-30 mg (MUCINEX DM)  MG tablet sustained-release 12 hour 1 tablet, Oral, 2 Times Daily PRN    losartan (COZAAR) 50 mg, Oral, Nightly    nicotine (NICODERM CQ) 21 MG/24HR patch 1 patch, Transdermal, Every 24 Hours    predniSONE (DELTASONE) 20 MG tablet Take 3 tablets by mouth Daily for 3 days, THEN 2 tablets Daily for 3 days, THEN 1 tablet Daily for 4 days.    pregabalin (LYRICA) 75 mg, Oral, 2 Times Daily    promethazine-dextromethorphan (PROMETHAZINE-DM) 6.25-15 MG/5ML syrup 5 mL, Oral, 4 Times Daily  "PRN    sildenafil (VIAGRA) 50 MG tablet TAKE ONE TABLET BY MOUTH DAILY AS NEEDED FOR  ERECTILE DYSFUNCTION     No Known Allergies   I have reviewed the above medications and allergies     Objective:      Vitals Vitals:    03/07/24 0913   BP: 104/62   BP Location: Left arm   Patient Position: Sitting   Cuff Size: Adult   Pulse: 64   Resp: 20   SpO2: 94%   Weight: 83.9 kg (185 lb)   Height: 180.3 cm (71\")     Body mass index is 25.8 kg/m².    Physical Exam  Vitals reviewed.   Constitutional:       General: He is not in acute distress.     Appearance: He is not ill-appearing.   HENT:      Right Ear: Tympanic membrane, ear canal and external ear normal.      Left Ear: Tympanic membrane, ear canal and external ear normal.      Mouth/Throat:      Pharynx: Posterior oropharyngeal erythema present.      Comments: Postnasal drip  Cardiovascular:      Rate and Rhythm: Normal rate and regular rhythm.   Pulmonary:      Effort: Pulmonary effort is normal.      Breath sounds: Normal breath sounds. No wheezing, rhonchi or rales.   Neurological:      Mental Status: He is alert.   Psychiatric:         Mood and Affect: Mood normal.         Behavior: Behavior normal.         Thought Content: Thought content normal.         Judgment: Judgment normal.            Assessment/Plan      Issues Addressed/ Plan   Diagnosis Plan   1. Acute cough  guaifenesin-dextromethorphan 600-30 mg (MUCINEX DM)  MG tablet sustained-release 12 hour      2. Esophagitis  Ambulatory Referral to Gastroenterology      3. Bronchitis  predniSONE (DELTASONE) 20 MG tablet    cefdinir (OMNICEF) 300 MG capsule      -Patient with recently diagnosed influenza A.  He has never been able to recover over the last 2 weeks.  He is still experiencing increased drainage, cough, chest congestion.  He feels he has gotten a little bit better however he does feel that his symptoms have persisted.  Going to treat with Mucinex to help try to break up some of his chest " congestion, however lungs are clear bilaterally on exam.  He is also experiencing increased sinus pressure and drainage.  Going to treat with antibiotics along with possible bronchitis.  Going to provide prednisone long taper due to his history of smoking and possible COPD risk.  - Patient had a low-dose chest CT scan completed on 12/11/2023 that indicated the presence of Lower esophageal swelling.  Given the patient's longstanding history of smoking, this is most likely esophagitis but cannot rule out neoplasm.  Suggested patient he needs to follow-up with gastroenterology for possible upper endoscopy.  Sent referral today.  There are no Patient Instructions on file for this visit.            Follow up  recommended Return in about 3 months (around 6/7/2024) for Blood Pressure.   - Dragon voice recognition software was utilized to complete this chart.  Every reasonable attempt was made to edit and correct the text, however some incorrect words may remain.   Toi Umana, DO

## 2024-03-08 ENCOUNTER — TELEPHONE (OUTPATIENT)
Dept: GASTROENTEROLOGY | Facility: CLINIC | Age: 69
End: 2024-03-08

## 2024-03-08 NOTE — TELEPHONE ENCOUNTER
Caller: HECTOR HAILE    Relationship: SELF    Best call back number: [phone]    What form or medical record are you requesting: APPOINTMENT REMINDER FOR DR HI FOR 4/3/24.    Who is requesting this form or medical record from you: PATIENT    How would you like to receive the form or medical records (pick-up, mail, fax): MAIL  If mail, what is the address:   [address] [address]

## 2024-03-22 ENCOUNTER — TELEPHONE (OUTPATIENT)
Dept: GASTROENTEROLOGY | Facility: CLINIC | Age: 69
End: 2024-03-22
Payer: MEDICARE

## 2024-04-01 DIAGNOSIS — I65.23 BILATERAL CAROTID ARTERY STENOSIS: Primary | ICD-10-CM

## 2024-04-01 DIAGNOSIS — I73.9 PERIPHERAL VASCULAR DISEASE, UNSPECIFIED: ICD-10-CM

## 2024-04-26 RX ORDER — CILOSTAZOL 50 MG/1
50 TABLET ORAL 2 TIMES DAILY
Qty: 180 TABLET | Refills: 3 | Status: SHIPPED | OUTPATIENT
Start: 2024-04-26

## 2024-07-19 ENCOUNTER — OFFICE VISIT (OUTPATIENT)
Dept: FAMILY MEDICINE CLINIC | Facility: CLINIC | Age: 69
End: 2024-07-19
Payer: MEDICARE

## 2024-07-19 VITALS
HEART RATE: 76 BPM | OXYGEN SATURATION: 92 % | DIASTOLIC BLOOD PRESSURE: 80 MMHG | BODY MASS INDEX: 26.04 KG/M2 | WEIGHT: 186 LBS | HEIGHT: 71 IN | SYSTOLIC BLOOD PRESSURE: 132 MMHG

## 2024-07-19 DIAGNOSIS — K21.00 GASTROESOPHAGEAL REFLUX DISEASE WITH ESOPHAGITIS, UNSPECIFIED WHETHER HEMORRHAGE: ICD-10-CM

## 2024-07-19 DIAGNOSIS — J42 CHRONIC BRONCHITIS, UNSPECIFIED CHRONIC BRONCHITIS TYPE: Primary | ICD-10-CM

## 2024-07-19 DIAGNOSIS — M79.671 BILATERAL FOOT PAIN: ICD-10-CM

## 2024-07-19 DIAGNOSIS — M79.672 BILATERAL FOOT PAIN: ICD-10-CM

## 2024-07-19 PROCEDURE — 1159F MED LIST DOCD IN RCRD: CPT | Performed by: STUDENT IN AN ORGANIZED HEALTH CARE EDUCATION/TRAINING PROGRAM

## 2024-07-19 PROCEDURE — 3075F SYST BP GE 130 - 139MM HG: CPT | Performed by: STUDENT IN AN ORGANIZED HEALTH CARE EDUCATION/TRAINING PROGRAM

## 2024-07-19 PROCEDURE — 3079F DIAST BP 80-89 MM HG: CPT | Performed by: STUDENT IN AN ORGANIZED HEALTH CARE EDUCATION/TRAINING PROGRAM

## 2024-07-19 PROCEDURE — 1160F RVW MEDS BY RX/DR IN RCRD: CPT | Performed by: STUDENT IN AN ORGANIZED HEALTH CARE EDUCATION/TRAINING PROGRAM

## 2024-07-19 PROCEDURE — 99214 OFFICE O/P EST MOD 30 MIN: CPT | Performed by: STUDENT IN AN ORGANIZED HEALTH CARE EDUCATION/TRAINING PROGRAM

## 2024-07-19 RX ORDER — FLUTICASONE FUROATE, UMECLIDINIUM BROMIDE AND VILANTEROL TRIFENATATE 200; 62.5; 25 UG/1; UG/1; UG/1
1 POWDER RESPIRATORY (INHALATION)
Qty: 60 EACH | Refills: 2 | Status: SHIPPED | OUTPATIENT
Start: 2024-07-19

## 2024-07-19 RX ORDER — ESOMEPRAZOLE MAGNESIUM 40 MG/1
40 CAPSULE, DELAYED RELEASE ORAL
Qty: 30 CAPSULE | Refills: 2 | Status: SHIPPED | OUTPATIENT
Start: 2024-07-19

## 2024-07-19 RX ORDER — ALBUTEROL SULFATE 90 UG/1
2 AEROSOL, METERED RESPIRATORY (INHALATION) EVERY 4 HOURS PRN
Qty: 18 G | Refills: 2 | Status: SHIPPED | OUTPATIENT
Start: 2024-07-19

## 2024-07-20 NOTE — PROGRESS NOTES
Toi Umana,   [facility]  [address]  [place] [id number] [phone]    Subjective      Name Sammy Roblero MRN [id number]    1955 AGE/SEX 68 y.o. / male      Chief Complaint Chief Complaint   Patient presents with    COPD    Hypertension    Hyperlipidemia         Visit History for  2024    Sammy Roblero is a 68 y.o. male who presented today for COPD, Hypertension, and Hyperlipidemia     History of Present Illness  The patient is presenting today for follow-up on COPD, esophagitis and he is having issues with his feet.    He has been experiencing foot discomfort for several years, which intensifies during prolonged standing. The pain is located on both sides of the pads of his feet. He describes the sensation as if he is walking on balls, and his feet feel persistently cold. He denies any numbness or tingling, but occasionally experiences slight numbness.    A low-dose CT scan conducted on 2023 revealed lower esophageal swelling. He continues to experience reflux issues. He has Nexium at home, but is not currently taking it. He had an appointment with a gastroenterologist, but cancelled it and is still interested in doing so.    He continues to experience breathing difficulties. He is requesting a refill of his albuterol inhaler today. He was previously on a controller inhaler, but does not recall the name. He understands the importance of using the inhalers and has not had one prescribed to him in some time. He believes he has seen a podiatrist and a pulmonologist in the past.       Medications and Allergies   Current Outpatient Medications   Medication Instructions    aspirin 81 mg, Oral, Daily    atorvastatin (LIPITOR) 40 mg, Oral, Daily    cilostazol (PLETAL) 50 mg, Oral, 2 Times Daily    clopidogrel (PLAVIX) 75 mg, Oral, Daily    dilTIAZem CD (CARDIZEM CD) 300 mg, Oral, Daily    guaifenesin-dextromethorphan 600-30 mg (MUCINEX DM)  " MG tablet sustained-release 12 hour 1 tablet, Oral, 2 Times Daily PRN    losartan (COZAAR) 50 mg, Oral, Nightly    nicotine (NICODERM CQ) 21 MG/24HR patch 1 patch, Transdermal, Every 24 Hours    pregabalin (LYRICA) 75 mg, Oral, 2 Times Daily    promethazine-dextromethorphan (PROMETHAZINE-DM) 6.25-15 MG/5ML syrup 5 mL, Oral, 4 Times Daily PRN    sildenafil (VIAGRA) 50 MG tablet TAKE ONE TABLET BY MOUTH DAILY AS NEEDED FOR  ERECTILE DYSFUNCTION     No Known Allergies   I have reviewed the above medications and allergies     Objective:      Vitals Vitals:    07/19/24 1640   BP: 132/80   Pulse: 76   SpO2: 92%   Weight: 84.4 kg (186 lb)   Height: 180.3 cm (71\")     Body mass index is 25.94 kg/m².    Physical Exam  Vitals reviewed.   Constitutional:       General: He is not in acute distress.     Appearance: He is not ill-appearing.   Cardiovascular:      Rate and Rhythm: Normal rate and regular rhythm.   Pulmonary:      Effort: Pulmonary effort is normal.      Breath sounds: Decreased breath sounds present.   Psychiatric:         Mood and Affect: Mood normal.         Behavior: Behavior normal.         Thought Content: Thought content normal.         Judgment: Judgment normal.        Physical Exam       Results  Imaging  Low dose CT indicated lower esophageal swelling.     Assessment/Plan   Issues Addressed/ Plan   Diagnosis Plan   1. Chronic bronchitis, unspecified chronic bronchitis type        2. Bilateral foot pain        3. Gastroesophageal reflux disease with esophagitis, unspecified whether hemorrhage           Assessment & Plan  1. COPD.  He still currently smokes and is unsure if he wishes to quit at this time. I am going to prescribe Trelegy 200 mg to be used as a controller inhaler. I discussed that he might need to speak to his pharmacist to find out which inhaler that is similar could be covered by his insurance. I will go ahead and send him the albuterol inhaler that he needs for as needed. We " discussed the importance of having controller medications along with his emergency inhaler. He may need follow-up with pulmonology and also PFT testing in the future. He was also given a sample of Trelegy today to use for the next 2 weeks until we are able to get one prescribed for him.    2. GERD.  We discussed his diagnosis of lower esophageal swelling, which is most likely caused by GERD. I would like for him to continue his Nexium and take it on a regular basis in order to help with healing. He also still needs to follow up with GI for upper endoscopy for further testing and to make sure that he does not have esophageal cancer. I will resend a GI consult.    3. Bilateral foot pain.  He would like to see a podiatrist. Unclear etiology for his bilateral foot pain, but most likely dealing with plantar fasciitis. I will go ahead and make a referral to podiatry for him today.    Follow-up  The patient will follow up in 3 months to check on his COPD.           There are no Patient Instructions on file for this visit.   Follow up  recommended Return in about 3 months (around 10/19/2024) for copd.   - Dragon voice recognition software was utilized to complete this chart.  Every reasonable attempt was made to edit and correct the text, however some incorrect words may remain.   Toi Umana DO    Patient or patient representative verbalized consent for the use of Ambient Listening during the visit with  Toi Umana DO for chart documentation. 7/19/2024  22:00 EDT

## 2024-07-22 ENCOUNTER — TELEPHONE (OUTPATIENT)
Dept: FAMILY MEDICINE CLINIC | Facility: CLINIC | Age: 69
End: 2024-07-22
Payer: MEDICARE

## 2024-07-22 NOTE — TELEPHONE ENCOUNTER
Patient would like to know if an appointment has been scheduled for a foot&ankle doctor.  Please advise

## 2024-08-19 ENCOUNTER — OFFICE VISIT (OUTPATIENT)
Dept: GASTROENTEROLOGY | Facility: CLINIC | Age: 69
End: 2024-08-19
Payer: MEDICARE

## 2024-08-19 ENCOUNTER — TELEPHONE (OUTPATIENT)
Dept: GASTROENTEROLOGY | Facility: CLINIC | Age: 69
End: 2024-08-19
Payer: MEDICARE

## 2024-08-19 ENCOUNTER — PREP FOR SURGERY (OUTPATIENT)
Dept: SURGERY | Facility: SURGERY CENTER | Age: 69
End: 2024-08-19
Payer: MEDICARE

## 2024-08-19 VITALS
DIASTOLIC BLOOD PRESSURE: 86 MMHG | HEIGHT: 71 IN | SYSTOLIC BLOOD PRESSURE: 134 MMHG | BODY MASS INDEX: 25.68 KG/M2 | WEIGHT: 183.4 LBS

## 2024-08-19 DIAGNOSIS — F17.200 TOBACCO DEPENDENCE: ICD-10-CM

## 2024-08-19 DIAGNOSIS — Z86.010 PERSONAL HISTORY OF COLONIC POLYPS: ICD-10-CM

## 2024-08-19 DIAGNOSIS — R93.3 ABNORMAL CT SCAN, ESOPHAGUS: Primary | ICD-10-CM

## 2024-08-19 DIAGNOSIS — R13.19 ESOPHAGEAL DYSPHAGIA: ICD-10-CM

## 2024-08-19 DIAGNOSIS — K21.00 GASTROESOPHAGEAL REFLUX DISEASE WITH ESOPHAGITIS WITHOUT HEMORRHAGE: ICD-10-CM

## 2024-08-19 DIAGNOSIS — Z12.11 ENCOUNTER FOR SCREENING COLONOSCOPY: ICD-10-CM

## 2024-08-19 DIAGNOSIS — K21.00 GASTROESOPHAGEAL REFLUX DISEASE WITH ESOPHAGITIS, UNSPECIFIED WHETHER HEMORRHAGE: ICD-10-CM

## 2024-08-19 PROBLEM — Z86.0100 PERSONAL HISTORY OF COLONIC POLYPS: Status: ACTIVE | Noted: 2024-08-19

## 2024-08-19 PROCEDURE — 3079F DIAST BP 80-89 MM HG: CPT

## 2024-08-19 PROCEDURE — 99214 OFFICE O/P EST MOD 30 MIN: CPT

## 2024-08-19 PROCEDURE — 3075F SYST BP GE 130 - 139MM HG: CPT

## 2024-08-19 RX ORDER — SODIUM CHLORIDE, SODIUM LACTATE, POTASSIUM CHLORIDE, CALCIUM CHLORIDE 600; 310; 30; 20 MG/100ML; MG/100ML; MG/100ML; MG/100ML
30 INJECTION, SOLUTION INTRAVENOUS CONTINUOUS PRN
OUTPATIENT
Start: 2024-08-19

## 2024-08-19 RX ORDER — SODIUM CHLORIDE 0.9 % (FLUSH) 0.9 %
3 SYRINGE (ML) INJECTION EVERY 12 HOURS SCHEDULED
OUTPATIENT
Start: 2024-08-19

## 2024-08-19 RX ORDER — SODIUM CHLORIDE 0.9 % (FLUSH) 0.9 %
10 SYRINGE (ML) INJECTION AS NEEDED
OUTPATIENT
Start: 2024-08-19

## 2024-08-19 NOTE — PATIENT INSTRUCTIONS
Schedule EGD and colonoscopy, orders placed     For GERD:    Follow antireflux precautions:    Continue Nexium 40 mg once daily prior to the first meal of the day   Avoiding eating within 3 to 4 hours of bedtime.    Avoid foods that can trigger symptoms which may include:  citrus fruits  spicy foods,  Tomatoes  Red sauces   Chocolate  coffee/tea  caffeinated or carbonated beverages  alcohol

## 2024-08-19 NOTE — PROGRESS NOTES
"Chief Complaint   Patient presents with    Constipation    Heartburn           History of Present Illness    Patient is a 68 y.o. who presents to the office as a new patient for further evaluation of heartburn after referral received from primary care provider Dr. Umana. patient has a significant past medical history of tobacco use, COPD with inhaled corticosteroid use.    Most recent colonoscopy was completed greater than 7 years ago with Dr. MADYSON santamaria.  Records unavailable for viewing at time of today's visit however patient reports presence of polyps.    Patient presented to primary care provider's office on 7/19/2024 with concerns of worsening heartburn symptoms.  This was restarted at this time and states that since restarting medication heartburn symptoms have resolved without nausea or dysphagia.    He does continue to experience esophageal dysphagia most notably with meat and breads that does not require forceful regurgitation and will pass with continued swallowing.    No unintentional weight loss.    Bowel habits are described as occurring regularly without evidence of melena or hematochezia.    Patient denies known family history of colon polyps, colon cancer, or IBD.       Result Review :       Office Visit with Toi Umana DO (07/19/2024)   CT Chest Low Dose Cancer Screening WO (12/11/2023 09:32)   Marked thickening of lower esophagus suspicious for esophagitis versus neoplasm measuring up to 11 mm in thickness with recommendations to proceed with EGD    Vital Signs:   /86 (BP Location: Left arm, Patient Position: Sitting, Cuff Size: Large Adult)   Ht 180.3 cm (70.98\")   Wt 83.2 kg (183 lb 6.4 oz)   BMI 25.59 kg/m²     Body mass index is 25.59 kg/m².     Physical Exam  Vitals reviewed.   Constitutional:       General: He is not in acute distress.     Appearance: Normal appearance. He is well-developed. He is not diaphoretic.   HENT:      Head: Normocephalic and atraumatic.   Eyes:      " General: No scleral icterus.  Cardiovascular:      Rate and Rhythm: Normal rate and regular rhythm.   Pulmonary:      Effort: Pulmonary effort is normal. No respiratory distress.      Breath sounds: Normal breath sounds.   Abdominal:      General: Bowel sounds are normal. There is no distension.      Palpations: Abdomen is soft. There is no mass.      Tenderness: There is no abdominal tenderness. There is no guarding or rebound.      Hernia: No hernia is present.   Skin:     General: Skin is warm and dry.      Coloration: Skin is not jaundiced.   Neurological:      Mental Status: He is alert and oriented to person, place, and time.   Psychiatric:         Mood and Affect: Mood normal.         Behavior: Behavior normal.         Thought Content: Thought content normal.         Judgment: Judgment normal.           Assessment and Plan    Diagnoses and all orders for this visit:    1. Abnormal CT scan, esophagus (Primary)    2. Esophageal dysphagia    3. Gastroesophageal reflux disease with esophagitis without hemorrhage    4. Personal history of colonic polyps    5. Encounter for screening colonoscopy    6. Tobacco dependence           Discussion:    Patient presents today for further evaluation of heartburn, esophageal dysphagia, and discuss timing of colon cancer screening.  Discussed plans to proceed with EGD evaluation to further assess abnormal CT findings of distal esophageal thickening.  This will allow for biopsy assessment and screening for Vines's esophagus and esophageal cancer.    While awaiting EGD evaluation he will continue with current esomeprazole 40 mg once daily regimen.  I have encouraged him to follow strict dysphagia recommendations by way of ensuring complete mastication of food and alternating solids and liquids during meal.  If evidence of stricturing at time of EGD we will plan to dilate at that time.    At time of EGD evaluation we will also proceed with colonoscopy for colon cancer  screening.  He will follow-up in our office 2 to 4 weeks after undergoing endoscopic evaluation with Further recommendations to be made pending results of the above work-up and clinical course.    Patient is agreeable to the outlined above treatment plan.  Verbalizes understanding and will contact office for any new or worsening concerns.  All questions answered and support provided.        Patient Instructions   Schedule EGD and colonoscopy, orders placed     For GERD:    Follow antireflux precautions:    Continue Nexium 40 mg once daily prior to the first meal of the day   Avoiding eating within 3 to 4 hours of bedtime.    Avoid foods that can trigger symptoms which may include:  citrus fruits  spicy foods,  Tomatoes  Red sauces   Chocolate  coffee/tea  caffeinated or carbonated beverages  alcohol          EMR Dragon/Transcription Disclaimer:  This document has been Dictated utilizing Dragon dictation.

## 2024-08-19 NOTE — TELEPHONE ENCOUNTER
Seen today, 08/19/2024 by CASS Montejo.  EGD & Colonoscopy was ordered.      R93.3 (ICD-10-CM) - Abnormal CT scan, esophagus  K21.00 (ICD-10-CM) - Gastroesophageal reflux disease with esophagitis, unspecified whether hemorrhage  R13.19 (ICD-10-CM) - Esophageal dysphagia  Z86.010 (ICD-10-CM) - Personal history of colonic polyps  Z12.11 (ICD-10-CM) - Encounter for screening colonoscopy       Offered 09/17/2024 at [facility] arrive 10am.  He states needs earlier.  Offered 09/20/2024, arrive 9am.  He states first thing in the morning.    Scheduled at [facility] 09/30/2024 at 8:15am - arrive 7am.      Need clearance from Dr. Martínez on Plavix & Pletal.

## 2024-09-04 RX ORDER — LOSARTAN POTASSIUM 50 MG/1
50 TABLET ORAL NIGHTLY
Qty: 90 TABLET | Refills: 3 | Status: SHIPPED | OUTPATIENT
Start: 2024-09-04

## 2024-09-25 RX ORDER — CLOPIDOGREL BISULFATE 75 MG/1
75 TABLET ORAL DAILY
Qty: 90 TABLET | Refills: 3 | Status: SHIPPED | OUTPATIENT
Start: 2024-09-25

## 2024-09-26 ENCOUNTER — TELEPHONE (OUTPATIENT)
Dept: GASTROENTEROLOGY | Facility: CLINIC | Age: 69
End: 2024-09-26
Payer: MEDICARE

## 2024-09-27 ENCOUNTER — TELEPHONE (OUTPATIENT)
Dept: CARDIOLOGY | Facility: CLINIC | Age: 69
End: 2024-09-27

## 2024-09-27 ENCOUNTER — OFFICE VISIT (OUTPATIENT)
Dept: CARDIOLOGY | Facility: CLINIC | Age: 69
End: 2024-09-27
Payer: MEDICARE

## 2024-09-27 VITALS
BODY MASS INDEX: 25.37 KG/M2 | WEIGHT: 181.2 LBS | SYSTOLIC BLOOD PRESSURE: 130 MMHG | HEART RATE: 90 BPM | OXYGEN SATURATION: 96 % | HEIGHT: 71 IN | DIASTOLIC BLOOD PRESSURE: 74 MMHG

## 2024-09-27 DIAGNOSIS — I10 PRIMARY HYPERTENSION: ICD-10-CM

## 2024-09-27 DIAGNOSIS — I25.10 CORONARY ARTERY DISEASE INVOLVING NATIVE CORONARY ARTERY OF NATIVE HEART WITHOUT ANGINA PECTORIS: Primary | ICD-10-CM

## 2024-09-27 DIAGNOSIS — F17.200 TOBACCO DEPENDENCE: ICD-10-CM

## 2024-09-27 DIAGNOSIS — E78.2 MIXED HYPERLIPIDEMIA: ICD-10-CM

## 2024-10-10 RX ORDER — DILTIAZEM HYDROCHLORIDE 300 MG/1
300 CAPSULE, COATED, EXTENDED RELEASE ORAL DAILY
Qty: 90 CAPSULE | Refills: 3 | Status: SHIPPED | OUTPATIENT
Start: 2024-10-10

## 2024-10-17 ENCOUNTER — OFFICE VISIT (OUTPATIENT)
Dept: FAMILY MEDICINE CLINIC | Facility: CLINIC | Age: 69
End: 2024-10-17
Payer: MEDICARE

## 2024-10-17 VITALS
BODY MASS INDEX: 26.04 KG/M2 | WEIGHT: 186 LBS | DIASTOLIC BLOOD PRESSURE: 78 MMHG | RESPIRATION RATE: 18 BRPM | OXYGEN SATURATION: 95 % | HEART RATE: 82 BPM | SYSTOLIC BLOOD PRESSURE: 132 MMHG | HEIGHT: 71 IN

## 2024-10-17 DIAGNOSIS — N52.9 ERECTILE DYSFUNCTION, UNSPECIFIED ERECTILE DYSFUNCTION TYPE: ICD-10-CM

## 2024-10-17 DIAGNOSIS — E78.5 DYSLIPIDEMIA: Primary | ICD-10-CM

## 2024-10-17 DIAGNOSIS — I10 PRIMARY HYPERTENSION: ICD-10-CM

## 2024-10-17 DIAGNOSIS — R73.03 PREDIABETES: ICD-10-CM

## 2024-10-17 PROCEDURE — 99214 OFFICE O/P EST MOD 30 MIN: CPT | Performed by: STUDENT IN AN ORGANIZED HEALTH CARE EDUCATION/TRAINING PROGRAM

## 2024-10-17 PROCEDURE — 3078F DIAST BP <80 MM HG: CPT | Performed by: STUDENT IN AN ORGANIZED HEALTH CARE EDUCATION/TRAINING PROGRAM

## 2024-10-17 PROCEDURE — 3075F SYST BP GE 130 - 139MM HG: CPT | Performed by: STUDENT IN AN ORGANIZED HEALTH CARE EDUCATION/TRAINING PROGRAM

## 2024-10-17 RX ORDER — SILDENAFIL 50 MG/1
50 TABLET, FILM COATED ORAL DAILY PRN
Qty: 30 TABLET | Refills: 3 | Status: SHIPPED | OUTPATIENT
Start: 2024-10-17

## 2024-10-17 RX ORDER — DULOXETIN HYDROCHLORIDE 30 MG/1
30 CAPSULE, DELAYED RELEASE ORAL 2 TIMES DAILY
COMMUNITY
Start: 2024-09-17

## 2024-10-17 NOTE — PROGRESS NOTES
Venipuncture Blood Specimen Collection  Venipuncture performed in left arm by Zakiya Meneses MA with good hemostasis. Patient tolerated the procedure well without complications.   10/17/24   Zakiya Meneses MA

## 2024-10-18 LAB
ALBUMIN SERPL-MCNC: 4.4 G/DL (ref 3.5–5.2)
ALBUMIN/GLOB SERPL: 1.7 G/DL
ALP SERPL-CCNC: 131 U/L (ref 39–117)
ALT SERPL-CCNC: 19 U/L (ref 1–41)
AST SERPL-CCNC: 19 U/L (ref 1–40)
BILIRUB SERPL-MCNC: 0.3 MG/DL (ref 0–1.2)
BUN SERPL-MCNC: 17 MG/DL (ref 8–23)
BUN/CREAT SERPL: 14.4 (ref 7–25)
CALCIUM SERPL-MCNC: 10.3 MG/DL (ref 8.6–10.5)
CHLORIDE SERPL-SCNC: 107 MMOL/L (ref 98–107)
CHOLEST SERPL-MCNC: 187 MG/DL (ref 0–200)
CO2 SERPL-SCNC: 26.7 MMOL/L (ref 22–29)
CREAT SERPL-MCNC: 1.18 MG/DL (ref 0.76–1.27)
EGFRCR SERPLBLD CKD-EPI 2021: 66.8 ML/MIN/1.73
GLOBULIN SER CALC-MCNC: 2.6 GM/DL
GLUCOSE SERPL-MCNC: 104 MG/DL (ref 65–99)
HBA1C MFR BLD: 6.2 % (ref 4.8–5.6)
HDLC SERPL-MCNC: 37 MG/DL (ref 40–60)
LDLC SERPL CALC-MCNC: 101 MG/DL (ref 0–100)
POTASSIUM SERPL-SCNC: 5.4 MMOL/L (ref 3.5–5.2)
PROT SERPL-MCNC: 7 G/DL (ref 6–8.5)
SODIUM SERPL-SCNC: 143 MMOL/L (ref 136–145)
TRIGL SERPL-MCNC: 288 MG/DL (ref 0–150)
VLDLC SERPL CALC-MCNC: 49 MG/DL (ref 5–40)

## 2024-10-22 DIAGNOSIS — J42 CHRONIC BRONCHITIS, UNSPECIFIED CHRONIC BRONCHITIS TYPE: ICD-10-CM

## 2024-10-22 DIAGNOSIS — K21.00 GASTROESOPHAGEAL REFLUX DISEASE WITH ESOPHAGITIS, UNSPECIFIED WHETHER HEMORRHAGE: ICD-10-CM

## 2024-10-22 RX ORDER — ESOMEPRAZOLE MAGNESIUM 40 MG/1
40 CAPSULE, DELAYED RELEASE ORAL
Qty: 30 CAPSULE | Refills: 2 | Status: SHIPPED | OUTPATIENT
Start: 2024-10-22

## 2024-10-22 RX ORDER — FLUTICASONE FUROATE, UMECLIDINIUM BROMIDE AND VILANTEROL TRIFENATATE 200; 62.5; 25 UG/1; UG/1; UG/1
1 POWDER RESPIRATORY (INHALATION) DAILY
Qty: 60 EACH | Refills: 2 | Status: SHIPPED | OUTPATIENT
Start: 2024-10-22

## 2024-10-22 NOTE — TELEPHONE ENCOUNTER
Rx Refill Note  Requested Prescriptions     Pending Prescriptions Disp Refills    esomeprazole (nexIUM) 40 MG capsule [Pharmacy Med Name: ESOMEPRAZOLE MAG DR 40 MG CAP] 30 capsule 2     Sig: TAKE ONE CAPSULE BY MOUTH EVERY MORNING BEFORE BREAKFAST    Trelegy Ellipta 200-62.5-25 MCG/ACT inhaler [Pharmacy Med Name: TRELEGY ELLIPTA 200-62.5-25] 60 each 2     Sig: INHALE 1 PUFF BY MOUTH DAILY      Last office visit with prescribing clinician: 10/17/2024   Last telemedicine visit with prescribing clinician: Visit date not found   Next office visit with prescribing clinician: 1/13/2025

## 2024-11-04 ENCOUNTER — TELEPHONE (OUTPATIENT)
Dept: FAMILY MEDICINE CLINIC | Facility: CLINIC | Age: 69
End: 2024-11-04
Payer: MEDICARE

## 2024-11-04 NOTE — TELEPHONE ENCOUNTER
Patient wondering if he could have medication for a sinus infection without having to be seen in the office. He stated with his COPD he usually get sick this time of year. Please advise

## 2024-11-11 ENCOUNTER — OFFICE VISIT (OUTPATIENT)
Dept: FAMILY MEDICINE CLINIC | Facility: CLINIC | Age: 69
End: 2024-11-11
Payer: MEDICARE

## 2024-11-11 VITALS
SYSTOLIC BLOOD PRESSURE: 140 MMHG | DIASTOLIC BLOOD PRESSURE: 88 MMHG | OXYGEN SATURATION: 94 % | RESPIRATION RATE: 18 BRPM | HEIGHT: 71 IN | HEART RATE: 88 BPM | BODY MASS INDEX: 25.9 KG/M2 | WEIGHT: 185 LBS

## 2024-11-11 DIAGNOSIS — J42 CHRONIC BRONCHITIS, UNSPECIFIED CHRONIC BRONCHITIS TYPE: ICD-10-CM

## 2024-11-11 DIAGNOSIS — J32.9 RHINOSINUSITIS: Primary | ICD-10-CM

## 2024-11-11 PROCEDURE — 3079F DIAST BP 80-89 MM HG: CPT | Performed by: STUDENT IN AN ORGANIZED HEALTH CARE EDUCATION/TRAINING PROGRAM

## 2024-11-11 PROCEDURE — 99213 OFFICE O/P EST LOW 20 MIN: CPT | Performed by: STUDENT IN AN ORGANIZED HEALTH CARE EDUCATION/TRAINING PROGRAM

## 2024-11-11 PROCEDURE — 3077F SYST BP >= 140 MM HG: CPT | Performed by: STUDENT IN AN ORGANIZED HEALTH CARE EDUCATION/TRAINING PROGRAM

## 2024-11-11 RX ORDER — AZITHROMYCIN 250 MG/1
TABLET, FILM COATED ORAL
Qty: 6 TABLET | Refills: 0 | Status: SHIPPED | OUTPATIENT
Start: 2024-11-11

## 2024-11-11 RX ORDER — ALBUTEROL SULFATE 90 UG/1
2 INHALANT RESPIRATORY (INHALATION) EVERY 4 HOURS PRN
Qty: 18 G | Refills: 2 | Status: SHIPPED | OUTPATIENT
Start: 2024-11-11

## 2024-11-24 NOTE — PROGRESS NOTES
Toi Umana,   [facility]  [address]  [place] [id number] [phone]    Subjective      Name Sammy Roblero MRN [id number]    1955 AGE/SEX 69 y.o. / male      Chief Complaint Chief Complaint   Patient presents with    Sinusitis     Sinus issues for the last 2 weeks. Chest and sinus congestion. Has been using cough syrup at home with little relief          Visit History for  2024    Sammy Roblero is a 69 y.o. male who presented today for Sinusitis (Sinus issues for the last 2 weeks. Chest and sinus congestion. Has been using cough syrup at home with little relief )       History of Present Illness  The patient presents for evaluation of sinus issues.    He is experiencing a recurrence of his sinus problems, which have been causing him discomfort. He has been using a squeeze bottle to alleviate the symptoms. He has also been searching for a specific cough medicine that he previously used for his sinus issues, but has been unable to find it.       Medications and Allergies   Current Outpatient Medications   Medication Instructions    albuterol sulfate  (90 Base) MCG/ACT inhaler 2 puffs, Inhalation, Every 4 Hours PRN    aspirin 81 mg, Oral, Daily    atorvastatin (LIPITOR) 40 mg, Oral, Daily    azithromycin (Zithromax Z-Kaz) 250 MG tablet Take 2 tablets by mouth on day 1, then 1 tablet daily on days 2-5    cilostazol (PLETAL) 50 mg, Oral, 2 Times Daily    clopidogrel (PLAVIX) 75 mg, Oral, Daily    dilTIAZem CD (CARDIZEM CD) 300 mg, Oral, Daily    DULoxetine (CYMBALTA) 30 mg, 2 Times Daily    esomeprazole (NEXIUM) 40 mg, Oral    losartan (COZAAR) 50 mg, Oral, Nightly    pregabalin (LYRICA) 75 mg, Oral, 2 Times Daily    sildenafil (VIAGRA) 50 mg, Oral, Daily PRN    Trelegy Ellipta 200-62.5-25 MCG/ACT inhaler 1 puff, Inhalation, Daily     No Known Allergies   I have reviewed the above medications and allergies     Objective:      Vitals Vitals:     "11/11/24 1434   BP: 140/88   BP Location: Left arm   Patient Position: Sitting   Cuff Size: Adult   Pulse: 88   Resp: 18   SpO2: 94%   Weight: 83.9 kg (185 lb)   Height: 180.3 cm (71\")     Body mass index is 25.8 kg/m².    Physical Exam  Vitals reviewed.   Constitutional:       General: He is not in acute distress.     Appearance: He is not ill-appearing.   HENT:      Nose: Congestion and rhinorrhea present.      Mouth/Throat:      Pharynx: No posterior oropharyngeal erythema.   Cardiovascular:      Rate and Rhythm: Normal rate and regular rhythm.   Pulmonary:      Effort: Pulmonary effort is normal.      Breath sounds: No wheezing or rhonchi.   Psychiatric:         Mood and Affect: Mood normal.         Behavior: Behavior normal.         Thought Content: Thought content normal.         Judgment: Judgment normal.          Physical Exam       Results       Assessment/Plan   Issues Addressed/ Plan   Diagnosis Plan   1. Rhinosinusitis  azithromycin (Zithromax Z-Kaz) 250 MG tablet      2. Chronic bronchitis, unspecified chronic bronchitis type  albuterol sulfate  (90 Base) MCG/ACT inhaler         Assessment & Plan  1. Sinusitis.  He reports worsening sinus symptoms with tenderness and significant nasal congestion. Examination reveals a large amount of nasal discharge. He is advised to continue using saline rinses to help clear the nasal passages. A prescription for medication to alleviate his sinus symptoms has been sent to the pharmacy. If he develops symptoms such as fever, body aches, chills, runny nose, or cough, he should inform the clinic immediately.    2. Chronic Obstructive Pulmonary Disease (COPD).  His breathing has improved significantly compared to the previous visit, indicating that the current treatment regimen is effective. He is advised to continue his breathing treatments.         BMI is >= 25 and <30. (Overweight) The following options were offered after discussion;: exercise " counseling/recommendations and nutrition counseling/recommendations     There are no Patient Instructions on file for this visit.   Follow up  recommended Return if symptoms worsen or fail to improve.   - Dragon voice recognition software was utilized to complete this chart.  Every reasonable attempt was made to edit and correct the text, however some incorrect words may remain.   Toi Umana DO    Patient or patient representative verbalized consent for the use of Ambient Listening during the visit with  Toi Umana DO for chart documentation. 11/23/2024  22:53 EST

## 2024-11-25 RX ORDER — ATORVASTATIN CALCIUM 40 MG/1
40 TABLET, FILM COATED ORAL DAILY
Qty: 90 TABLET | Refills: 3 | Status: SHIPPED | OUTPATIENT
Start: 2024-11-25

## 2024-12-02 ENCOUNTER — ANESTHESIA EVENT (OUTPATIENT)
Dept: PERIOP | Facility: HOSPITAL | Age: 69
End: 2024-12-02
Payer: MEDICARE

## 2024-12-04 ENCOUNTER — ANESTHESIA (OUTPATIENT)
Dept: PERIOP | Facility: HOSPITAL | Age: 69
End: 2024-12-04
Payer: MEDICARE

## 2024-12-04 ENCOUNTER — HOSPITAL ENCOUNTER (OUTPATIENT)
Facility: HOSPITAL | Age: 69
Setting detail: HOSPITAL OUTPATIENT SURGERY
Discharge: HOME | End: 2024-12-04
Attending: STUDENT IN AN ORGANIZED HEALTH CARE EDUCATION/TRAINING PROGRAM | Admitting: STUDENT IN AN ORGANIZED HEALTH CARE EDUCATION/TRAINING PROGRAM
Payer: MEDICARE

## 2024-12-04 VITALS
OXYGEN SATURATION: 96 % | TEMPERATURE: 98 F | SYSTOLIC BLOOD PRESSURE: 151 MMHG | DIASTOLIC BLOOD PRESSURE: 98 MMHG | BODY MASS INDEX: 25.24 KG/M2 | HEART RATE: 79 BPM | WEIGHT: 181 LBS | RESPIRATION RATE: 20 BRPM

## 2024-12-04 DIAGNOSIS — Z86.0100 PERSONAL HISTORY OF COLONIC POLYPS: ICD-10-CM

## 2024-12-04 DIAGNOSIS — R13.19 ESOPHAGEAL DYSPHAGIA: ICD-10-CM

## 2024-12-04 DIAGNOSIS — K21.00 GASTROESOPHAGEAL REFLUX DISEASE WITH ESOPHAGITIS, UNSPECIFIED WHETHER HEMORRHAGE: ICD-10-CM

## 2024-12-04 DIAGNOSIS — R93.3 ABNORMAL CT SCAN, ESOPHAGUS: ICD-10-CM

## 2024-12-04 DIAGNOSIS — Z12.11 ENCOUNTER FOR SCREENING COLONOSCOPY: ICD-10-CM

## 2024-12-04 DIAGNOSIS — Z86.0100 HISTORY OF COLONIC POLYPS: ICD-10-CM

## 2024-12-04 PROCEDURE — 45385 COLONOSCOPY W/LESION REMOVAL: CPT | Performed by: STUDENT IN AN ORGANIZED HEALTH CARE EDUCATION/TRAINING PROGRAM

## 2024-12-04 PROCEDURE — 43239 EGD BIOPSY SINGLE/MULTIPLE: CPT | Performed by: STUDENT IN AN ORGANIZED HEALTH CARE EDUCATION/TRAINING PROGRAM

## 2024-12-04 PROCEDURE — 25010000002 LIDOCAINE 2% SOLUTION: Performed by: ANESTHESIOLOGY

## 2024-12-04 PROCEDURE — 45380 COLONOSCOPY AND BIOPSY: CPT | Performed by: STUDENT IN AN ORGANIZED HEALTH CARE EDUCATION/TRAINING PROGRAM

## 2024-12-04 PROCEDURE — 25010000002 PROPOFOL 200 MG/20ML EMULSION: Performed by: ANESTHESIOLOGY

## 2024-12-04 PROCEDURE — 88305 TISSUE EXAM BY PATHOLOGIST: CPT | Performed by: STUDENT IN AN ORGANIZED HEALTH CARE EDUCATION/TRAINING PROGRAM

## 2024-12-04 DEVICE — WORKING LENGTH 235CM, WORKING CHANNEL 2.8MM
Type: IMPLANTABLE DEVICE | Site: TRANSVERSE COLON | Status: FUNCTIONAL
Brand: RESOLUTION 360 CLIP

## 2024-12-04 RX ORDER — LIDOCAINE HYDROCHLORIDE 20 MG/ML
INJECTION, SOLUTION INFILTRATION; PERINEURAL AS NEEDED
Status: DISCONTINUED | OUTPATIENT
Start: 2024-12-04 | End: 2024-12-04 | Stop reason: SURG

## 2024-12-04 RX ORDER — SODIUM CHLORIDE 0.9 % (FLUSH) 0.9 %
10 SYRINGE (ML) INJECTION AS NEEDED
Status: DISCONTINUED | OUTPATIENT
Start: 2024-12-04 | End: 2024-12-04 | Stop reason: HOSPADM

## 2024-12-04 RX ORDER — SODIUM CHLORIDE, SODIUM LACTATE, POTASSIUM CHLORIDE, CALCIUM CHLORIDE 600; 310; 30; 20 MG/100ML; MG/100ML; MG/100ML; MG/100ML
9 INJECTION, SOLUTION INTRAVENOUS CONTINUOUS PRN
Status: DISCONTINUED | OUTPATIENT
Start: 2024-12-04 | End: 2024-12-04 | Stop reason: HOSPADM

## 2024-12-04 RX ORDER — PROPOFOL 10 MG/ML
INJECTION, EMULSION INTRAVENOUS AS NEEDED
Status: DISCONTINUED | OUTPATIENT
Start: 2024-12-04 | End: 2024-12-04 | Stop reason: SURG

## 2024-12-04 RX ORDER — LIDOCAINE HYDROCHLORIDE 10 MG/ML
0.5 INJECTION, SOLUTION EPIDURAL; INFILTRATION; INTRACAUDAL; PERINEURAL ONCE AS NEEDED
Status: DISCONTINUED | OUTPATIENT
Start: 2024-12-04 | End: 2024-12-04 | Stop reason: HOSPADM

## 2024-12-04 RX ORDER — SODIUM CHLORIDE 0.9 % (FLUSH) 0.9 %
10 SYRINGE (ML) INJECTION EVERY 12 HOURS SCHEDULED
Status: DISCONTINUED | OUTPATIENT
Start: 2024-12-04 | End: 2024-12-04 | Stop reason: HOSPADM

## 2024-12-04 RX ORDER — SODIUM CHLORIDE, SODIUM LACTATE, POTASSIUM CHLORIDE, CALCIUM CHLORIDE 600; 310; 30; 20 MG/100ML; MG/100ML; MG/100ML; MG/100ML
30 INJECTION, SOLUTION INTRAVENOUS CONTINUOUS PRN
Status: DISCONTINUED | OUTPATIENT
Start: 2024-12-04 | End: 2024-12-04 | Stop reason: HOSPADM

## 2024-12-04 RX ORDER — SODIUM CHLORIDE 0.9 % (FLUSH) 0.9 %
3 SYRINGE (ML) INJECTION EVERY 12 HOURS SCHEDULED
Status: DISCONTINUED | OUTPATIENT
Start: 2024-12-04 | End: 2024-12-04 | Stop reason: HOSPADM

## 2024-12-04 RX ADMIN — LIDOCAINE HYDROCHLORIDE 80 MG: 20 INJECTION, SOLUTION INFILTRATION; PERINEURAL at 10:56

## 2024-12-04 RX ADMIN — LIDOCAINE HYDROCHLORIDE 60 MG: 20 INJECTION, SOLUTION INFILTRATION; PERINEURAL at 11:02

## 2024-12-04 RX ADMIN — PROPOFOL 480 MG: 10 INJECTION, EMULSION INTRAVENOUS at 10:56

## 2024-12-04 NOTE — H&P
Patient Care Team:  Toi Umana DO as PCP - General (Family Medicine)    CHIEF COMPLAINT: Personal hx colon polyps, dysphagia, and abnormal CT imaging of esophagus    HISTORY OF PRESENT ILLNESS:  EGD for dysphagia and abnormal CT imaging of esophagus.   C/s for personal history of colon polyps.     Past Medical History:   Diagnosis Date    Arthritis     Chest pain     COPD (chronic obstructive pulmonary disease)     Coronary artery disease     Elevated cholesterol     Hx of seasonal allergies     Hypertension      Past Surgical History:   Procedure Laterality Date    AMPUTATION FINGER / THUMB Left     CARDIAC CATHETERIZATION N/A 02/06/2020    Procedure: Left Heart Cath;  Surgeon: Darwin Chao MD;  Location: [facility] CATH INVASIVE LOCATION;  Service: Cardiovascular;  Laterality: N/A;    CARDIAC CATHETERIZATION N/A 02/06/2020    Procedure: Left ventriculography;  Surgeon: Darwin Chao MD;  Location: [facility] CATH INVASIVE LOCATION;  Service: Cardiovascular;  Laterality: N/A;    CARDIAC CATHETERIZATION N/A 02/06/2020    Procedure: Optical Coherent Tomography;  Surgeon: Darwin Chao MD;  Location: [facility] CATH INVASIVE LOCATION;  Service: Cardiovascular;  Laterality: N/A;    CARDIAC CATHETERIZATION N/A 02/06/2020    Procedure: Coronary angiography;  Surgeon: Darwin Chao MD;  Location: [facility] CATH INVASIVE LOCATION;  Service: Cardiovascular;  Laterality: N/A;    COLONOSCOPY      SINUS SURGERY       History reviewed. No pertinent family history.  Social History     Tobacco Use    Smoking status: Every Day     Current packs/day: 2.00     Types: Cigarettes     Passive exposure: Current    Smokeless tobacco: Former     Types: Chew    Tobacco comments:     daily caffiene   Vaping Use    Vaping status: Former    Substances: Nicotine    Devices: Disposable, Refillable tank    Passive vaping exposure: Yes   Substance Use Topics    Alcohol use: Yes     Alcohol/week: 3.0 standard drinks of alcohol     Types: 3 Shots  of liquor per week     Comment: once a week if that    Drug use: Not Currently     Medications Prior to Admission   Medication Sig Dispense Refill Last Dose/Taking    albuterol sulfate  (90 Base) MCG/ACT inhaler Inhale 2 puffs Every 4 (Four) Hours As Needed for Wheezing. 18 g 2 12/1/2024    aspirin 81 MG EC tablet Take 1 tablet by mouth Daily. (Patient taking differently: Take 1 tablet by mouth Every Other Day.) 30 tablet 11 12/1/2024    atorvastatin (LIPITOR) 40 MG tablet Take 1 tablet by mouth Daily. 90 tablet 3 12/1/2024    cilostazol (PLETAL) 50 MG tablet TAKE ONE TABLET BY MOUTH TWICE A  tablet 3     clopidogrel (PLAVIX) 75 MG tablet TAKE 1 TABLET BY MOUTH DAILY 90 tablet 3 12/1/2024    dilTIAZem CD (CARDIZEM CD) 300 MG 24 hr capsule TAKE 1 CAPSULE BY MOUTH DAILY 90 capsule 3 12/1/2024    DULoxetine (CYMBALTA) 30 MG capsule Take 1 capsule by mouth 2 (Two) Times a Day.       esomeprazole (nexIUM) 40 MG capsule TAKE ONE CAPSULE BY MOUTH EVERY MORNING BEFORE BREAKFAST 30 capsule 2 12/1/2024    losartan (COZAAR) 50 MG tablet TAKE ONE TABLET BY MOUTH ONCE NIGHTLY 90 tablet 3 12/1/2024    pregabalin (Lyrica) 75 MG capsule Take 1 capsule by mouth 2 (Two) Times a Day. 60 capsule 0 12/1/2024    sildenafil (VIAGRA) 50 MG tablet Take 1 tablet by mouth Daily As Needed for Erectile Dysfunction. 30 tablet 3     Trelegy Ellipta 200-62.5-25 MCG/ACT inhaler INHALE 1 PUFF BY MOUTH DAILY 60 each 2 12/1/2024     Allergies:  Patient has no known allergies.    REVIEW OF SYSTEMS:  Please see the above history of present illness for pertinent positives and negatives.  The remainder of the patient's systems have been reviewed and are negative.     Vital Signs  Temp:  [97.5 °F (36.4 °C)] 97.5 °F (36.4 °C)  Heart Rate:  [75-80] 80  BP: (150-161)/(100-104) 150/100    Flowsheet Rows      Flowsheet Row First Filed Value   Admission Height --   Admission Weight 82.1 kg (181 lb) Documented at 12/04/2024 0955             Physical  Exam:  Physical Exam   Constitutional: Patient appears well-developed and well-nourished and in no acute distress   HEENT:   Head: Normocephalic and atraumatic.   Eyes:  Pupils are equal, round, and reactive to light. EOM are intact. Sclerae are anicteric and non-injected.  Mouth and Throat: Patient has moist mucous membranes. Oropharynx is clear of any erythema or exudate.     Neck: Neck supple. No JVD present. No thyromegaly present. No lymphadenopathy present.  Cardiovascular: Regular rate, regular rhythm, S1 normal and S2 normal.  Exam reveals no gallop and no friction rub.  No murmur heard.  Pulmonary/Chest: Lungs are clear to auscultation bilaterally. No respiratory distress. No wheezes. No rhonchi. No rales.   Abdominal: Soft. Bowel sounds are normal. No distension and no mass. There is no hepatosplenomegaly. There is no tenderness.   Musculoskeletal: Normal Muscle tone  Extremities: No edema. Pulses are palpable in all 4 extremities.  Neurological: Patient is alert and oriented to person, place, and time. Cranial nerves II-XII are grossly intact with no focal deficits.  Skin: Skin is warm. No rash noted. Nails show no clubbing.  No cyanosis or erythema.    Debilities/Disabilities Identified: None  Emotional Behavior: Appropriate     Results Review:   I reviewed the patient's new clinical results.    Lab Results (most recent)       None            Imaging Results (Most Recent)       None          reviewed    ECG/EMG Results (most recent)       None          reviewed    Assessment & Plan   Personal hx colon polyps, dysphagia, and abnormal CT imaging of esophagus /  EGD and colonoscopy      I discussed the patient's findings and my recommendations with patient.     Leandro Martinez MD  12/04/24  10:53 EST    Time: 10 min prior to procedure.

## 2024-12-04 NOTE — ANESTHESIA PREPROCEDURE EVALUATION
Anesthesia Evaluation     no history of anesthetic complications:   NPO Solid Status: > 8 hours  NPO Liquid Status: > 8 hours           Airway   Mallampati: I  TM distance: >3 FB  Neck ROM: full  No difficulty expected  Dental    (+) edentulous, lower dentures and upper dentures    Comment: Dentures removed    Pulmonary - normal exam   (+) a smoker Current, Smoked day of surgery, COPD,  Cardiovascular - normal exam    ECG reviewed  PT is on anticoagulation therapy    (+) hypertension, past MI (vasospasm) , CAD, PVD (bilateral feet), hyperlipidemia,  carotid artery disease left carotid    ROS comment: Follows with cardiologist    Neuro/Psych- negative ROS  GI/Hepatic/Renal/Endo      Musculoskeletal     (+) back pain, neck pain  Abdominal    Substance History   (+) alcohol use (several drinks/ week)     OB/GYN          Other   arthritis,     ROS/Med Hx Other: Patient chewing gum during pre op interview                Anesthesia Plan    ASA 3     MAC     intravenous induction     Anesthetic plan, risks, benefits, and alternatives have been provided, discussed and informed consent has been obtained with: patient.  Pre-procedure education provided  Use of blood products discussed with patient  Consented to blood products.    Plan discussed with CRNA.      CODE STATUS:

## 2024-12-04 NOTE — ANESTHESIA POSTPROCEDURE EVALUATION
Patient: Sammy Roblero    Procedure Summary       Date: 12/04/24 Room / Location: [facility] ENDOSCOPY 2 /  [facility]    Anesthesia Start: 1048 Anesthesia Stop: 1139    Procedures:       ESOPHAGOGASTRODUODENOSCOPY WITH BIOPSY      COLONOSCOPY WITH POLYPECTOMY Diagnosis:       Abnormal CT scan, esophagus      Gastroesophageal reflux disease with esophagitis, unspecified whether hemorrhage      Esophageal dysphagia      Personal history of colonic polyps      Encounter for screening colonoscopy      (Abnormal CT scan, esophagus [R93.3])      (Gastroesophageal reflux disease with esophagitis, unspecified whether hemorrhage [K21.00])      (Esophageal dysphagia [R13.19])      (Personal history of colonic polyps [Z86.010])      (Encounter for screening colonoscopy [Z12.11])    Surgeons: Leandro Martinez MD Provider: Karuna Alvarez MD    Anesthesia Type: MAC ASA Status: 3            Anesthesia Type: MAC    Vitals  Vitals Value Taken Time   /91 12/04/24 1203   Temp 98 °F (36.7 °C) 12/04/24 1139   Pulse 79 12/04/24 1202   Resp 20 12/04/24 1200   SpO2 96 % 12/04/24 1202   Vitals shown include unfiled device data.        Post Anesthesia Care and Evaluation    Patient location during evaluation: PHASE II  Patient participation: complete - patient participated  Level of consciousness: awake and alert  Pain score: 0  Pain management: adequate    Airway patency: patent  Anesthetic complications: No anesthetic complications  PONV Status: none  Cardiovascular status: acceptable  Respiratory status: acceptable  Hydration status: acceptable

## 2024-12-06 LAB
CYTO UR: NORMAL
LAB AP CASE REPORT: NORMAL
PATH REPORT.FINAL DX SPEC: NORMAL
PATH REPORT.GROSS SPEC: NORMAL

## 2024-12-11 ENCOUNTER — OFFICE VISIT (OUTPATIENT)
Dept: FAMILY MEDICINE CLINIC | Facility: CLINIC | Age: 69
End: 2024-12-11
Payer: MEDICARE

## 2024-12-11 VITALS
HEIGHT: 71 IN | DIASTOLIC BLOOD PRESSURE: 88 MMHG | WEIGHT: 188 LBS | OXYGEN SATURATION: 95 % | SYSTOLIC BLOOD PRESSURE: 140 MMHG | BODY MASS INDEX: 26.32 KG/M2 | HEART RATE: 75 BPM

## 2024-12-11 DIAGNOSIS — Z00.00 ENCOUNTER FOR WELL ADULT EXAM WITHOUT ABNORMAL FINDINGS: Primary | ICD-10-CM

## 2024-12-11 DIAGNOSIS — F17.210 CIGARETTE NICOTINE DEPENDENCE WITHOUT COMPLICATION: ICD-10-CM

## 2024-12-11 PROCEDURE — 3077F SYST BP >= 140 MM HG: CPT | Performed by: STUDENT IN AN ORGANIZED HEALTH CARE EDUCATION/TRAINING PROGRAM

## 2024-12-11 PROCEDURE — 3079F DIAST BP 80-89 MM HG: CPT | Performed by: STUDENT IN AN ORGANIZED HEALTH CARE EDUCATION/TRAINING PROGRAM

## 2024-12-11 PROCEDURE — 99397 PER PM REEVAL EST PAT 65+ YR: CPT | Performed by: STUDENT IN AN ORGANIZED HEALTH CARE EDUCATION/TRAINING PROGRAM

## 2024-12-11 PROCEDURE — 1160F RVW MEDS BY RX/DR IN RCRD: CPT | Performed by: STUDENT IN AN ORGANIZED HEALTH CARE EDUCATION/TRAINING PROGRAM

## 2024-12-11 PROCEDURE — 1159F MED LIST DOCD IN RCRD: CPT | Performed by: STUDENT IN AN ORGANIZED HEALTH CARE EDUCATION/TRAINING PROGRAM

## 2024-12-11 NOTE — PROGRESS NOTES
Toi Umana DO  [facility]  [address]  [place] [id number] [phone]    Subjective      Name Sammy Roblero MRN [id number]    1955 AGE/SEX 69 y.o. / male      Chief Complaint Chief Complaint   Patient presents with    Annual Exam        Visit History for  2024    History of Present Illness  Sammy Roblero 69 y.o. male who presents for an Annual Wellness Visit. He has a history of   Patient Active Problem List   Diagnosis    STEMI involving oth coronary artery of anterior wall    Chest pain    HTN (hypertension)    Tobacco dependence    Dyslipidemia    Coronary artery disease involving native coronary artery of native heart without angina pectoris    Cervical disc displacement    Gastroesophageal reflux disease with esophagitis    Esophageal dysphagia    Personal history of colonic polyps    Encounter for screening colonoscopy    Mixed hyperlipidemia   .History of Present Illness    He reports a persistent sensation of fluid in his head, which he attributes to his chronic sinus condition. He has been using Sudafed as part of his treatment regimen. He has a history of sinus surgery and polyp removal.    He also mentions a recent episode of sore throat.     He continues to smoke, with consumption varying between 1 to 2 packs per day. He has expressed interest in quitting and has previously contacted the Quit Now hotline. However, he has not utilized nicotine patches due to financial constraints.    He maintains a healthy diet and engages in regular physical activity, including walking.     He has observed an improvement in one foot compared to the other. He experiences leg weakness, which limits his mobility. He is currently on medication for this condition, prescribed by an orthopedist, and reports that it has been beneficial.          Current Life Goals:      Patient Care Team:  Toi Umana DO as PCP - General (Family Medicine)      Health  Habits     Diet & Exercise:       Diet:     [x] Generally Healthy   [] Low Carb    [] Vegetarian     [] Generally Unhealthy   [] Gluten Free  [] Vegan       Exercise:     Type: walking  Frequency: 3 times/week.       Tobacco Use:     Social History     Tobacco Use   Smoking Status Every Day    Current packs/day: 2.00    Types: Cigarettes    Passive exposure: Current   Smokeless Tobacco Former    Types: Chew   Tobacco Comments    daily rickey Roblero  reports that he has been smoking cigarettes. He has been exposed to tobacco smoke. He has quit using smokeless tobacco.  His smokeless tobacco use included chew. I have educated him on the risk of diseases from using tobacco products such as cancer, COPD, and heart disease.     I advised him to quit and he is not willing to quit.    I spent 3  minutes counseling the patient.              Alcohol Use:     Social History     Substance and Sexual Activity   Alcohol Use Yes    Alcohol/week: 3.0 standard drinks of alcohol    Types: 3 Shots of liquor per week    Comment: once a week if that         Counseling Given: [] Yes  [x] No       Dental Exam:   [] Up to date  [] Scheduled  [] Needed   Last Exam: dentures     Eye Exam:   [] Up to date  [] Scheduled  [] Needed   Last Exam: No issues at this time     Screenings:     PHQ-9 Depression Screening  Little interest or pleasure in doing things? Not at all   Feeling down, depressed, or hopeless? Not at all   PHQ-2 Total Score 0   Trouble falling or staying asleep, or sleeping too much?     Feeling tired or having little energy?     Poor appetite or overeating?     Feeling bad about yourself - or that you are a failure or have let yourself or your family down?     Trouble concentrating on things, such as reading the newspaper or watching television?     Moving or speaking so slowly that other people could have noticed? Or the opposite - being so fidgety or restless that you have been moving around a lot more than  usual?     Thoughts that you would be better off dead, or of hurting yourself in some way?     PHQ-9 Total Score     If you checked off any problems, how difficult have these problems made it for you to do your work, take care of things at home, or get along with other people? Not difficult at all        Hepatitis C Screening:   Hep C Virus Ab   Date Value Ref Range Status   11/10/2023 Non Reactive Non Reactive Final     Comment:     HCV antibody alone does not differentiate between previously  resolved infection and active infection. Equivocal and Reactive  HCV antibody results should be followed up with an HCV RNA test  to support the diagnosis of active HCV infection.         Lung Cancer Screening: Qualifies? [x] Yes  [] No   Completed:    Colon Cancer Screening:   Last Completed Colonoscopy            COLORECTAL CANCER SCREENING (COLONOSCOPY - Every 10 Years) Tentatively due on 12/4/2034 12/04/2024  Colonoscopy                     Prostate Cancer Screening:   PSA   Date Value Ref Range Status   11/10/2023 0.6 0.0 - 4.0 ng/mL Final     Comment:     Roche ECLIA methodology.  According to the American Urological Association, Serum PSA should  decrease and remain at undetectable levels after radical  prostatectomy. The AUA defines biochemical recurrence as an initial  PSA value 0.2 ng/mL or greater followed by a subsequent confirmatory  PSA value 0.2 ng/mL or greater.  Values obtained with different assay methods or kits cannot be used  interchangeably. Results cannot be interpreted as absolute evidence  of the presence or absence of malignant disease.            Advance Care Planning  Patient does not have an advance directive, information provided.    Review of Systems    The following portions of the patient's history were reviewed and updated as appropriate: allergies, current medications, past family history, past medical history, past social history, past surgical history and problem list.     Past  "Medical, Family, Social History     Medical History: has a past medical history of Arthritis, Chest pain, COPD (chronic obstructive pulmonary disease), Coronary artery disease, Elevated cholesterol, seasonal allergies, and Hypertension.   Surgical History: has a past surgical history that includes Amputation finger / thumb (Left); Sinus surgery; Cardiac catheterization (N/A, 02/06/2020); Cardiac catheterization (N/A, 02/06/2020); Cardiac catheterization (N/A, 02/06/2020); Cardiac catheterization (N/A, 02/06/2020); Colonoscopy; Esophagogastroduodenoscopy (N/A, 12/4/2024); and Colonoscopy w/ polypectomy (N/A, 12/4/2024).   Family History: family history is not on file.   Social History: reports that he has been smoking cigarettes. He has been exposed to tobacco smoke. He has quit using smokeless tobacco.  His smokeless tobacco use included chew. He reports current alcohol use of about 3.0 standard drinks of alcohol per week. He reports that he does not currently use drugs.       Medications and Allergies   Current Outpatient Medications   Medication Instructions    albuterol sulfate  (90 Base) MCG/ACT inhaler 2 puffs, Inhalation, Every 4 Hours PRN    aspirin 81 mg, Oral, Daily    atorvastatin (LIPITOR) 40 mg, Oral, Daily    cilostazol (PLETAL) 50 mg, Oral, 2 Times Daily    clopidogrel (PLAVIX) 75 mg, Oral, Daily    dilTIAZem CD (CARDIZEM CD) 300 mg, Oral, Daily    DULoxetine (CYMBALTA) 30 mg, 2 Times Daily    esomeprazole (NEXIUM) 40 mg, Oral    losartan (COZAAR) 50 mg, Oral, Nightly    pregabalin (LYRICA) 75 mg, Oral, 2 Times Daily    sildenafil (VIAGRA) 50 mg, Oral, Daily PRN    Trelegy Ellipta 200-62.5-25 MCG/ACT inhaler 1 puff, Inhalation, Daily     No Known Allergies       Objective:      Vitals Vitals:    12/11/24 1056   BP: 140/88   Pulse: 75   SpO2: 95%   Weight: 85.3 kg (188 lb)   Height: 180.3 cm (70.98\")     Body mass index is 26.23 kg/m².    Physical Exam  Vitals reviewed.   Constitutional:       " General: He is not in acute distress.     Appearance: He is not ill-appearing.   HENT:      Right Ear: Tympanic membrane, ear canal and external ear normal.      Left Ear: Tympanic membrane, ear canal and external ear normal.   Cardiovascular:      Rate and Rhythm: Normal rate and regular rhythm.   Pulmonary:      Effort: Pulmonary effort is normal.      Breath sounds: Normal breath sounds.   Neurological:      Mental Status: He is alert.   Psychiatric:         Mood and Affect: Mood normal.         Behavior: Behavior normal.         Thought Content: Thought content normal.         Judgment: Judgment normal.            Assessment/Plan      Issues Addressed/ Plan   Diagnosis Plan   1. Encounter for well adult exam without abnormal findings        2. Cigarette nicotine dependence without complication  CT Chest Low Dose Wo      Assessment & Plan  1. Sinus issues.  He reports a persistent sensation of fluid in his head, which he attributes to his chronic sinus condition. He has been using Sudafed as part of his treatment regimen.    2. Sore throat.  He mentioned having a sore throat recently. No significant concerns were noted during the examination.    3. Smoking cessation.  He continues to smoke, with consumption varying between 1 to 2 packs per day. He has expressed interest in quitting and has previously contacted the Quit Now hotline. However, he has not utilized nicotine patches due to financial constraints. He was advised to contact the Quit Now hotline again for assistance with smoking cessation and to inquire about coverage for nicotine patches.    4. High cholesterol.  His cholesterol levels were slightly elevated during the last lab work. He is currently on medication for cholesterol management. A referral to a cardiologist was made for further evaluation and management.    5. Leg weakness.  He reports weakness in his legs, especially after walking long distances, which causes pain and heaviness. He was  advised to keep his legs warm and wear dry socks, particularly during the winter season. He is currently on medication prescribed by another doctor, which has been helpful. He was advised to continue this medication and inform the office if the condition worsens.    6. Health maintenance.  A repeat chest scan will be scheduled at the hospital, and he will be contacted to arrange this. He was advised to continue using his inhalers as they are working well.    Follow-up  The patient will follow up in 3 months.    PROCEDURE  The patient has a history of sinus surgery and polyp removal.    Discussion:    Wears seat belt? [x] Yes  [] No     Wears sunscreen? [] Yes  [x] No      BMI: Body mass index is 26.23 kg/m².             There are no Patient Instructions on file for this visit.      Follow up  recommended Return in about 3 months (around 3/11/2025) for Blood Pressure.     Toi Umana, DO

## 2024-12-13 NOTE — PROGRESS NOTES
- EGD for dysphagia and abnormal CT imaging of esophagus.   - Findings/path: a Schatzki's Ring in the distal esophagus s/p disruption of the ring with cold forceps, a small HH, moderate gastritis (biopsied -- negative for H Pylori)    - POC: Etiology of dysphagia and abnormal CT imaging of esophagus is likely due to Schatzki's Ring which was disrupted with cold forceps. Continue Nexium 40 mg daily for gastritis seen.    - C/s for personal history of colon polyps.   - Findings/path: a sub-cm TA removed, a sub-cm SSL removed   - POC: repeat c/s in 5 years

## 2024-12-24 ENCOUNTER — HOSPITAL ENCOUNTER (OUTPATIENT)
Dept: CT IMAGING | Facility: HOSPITAL | Age: 69
Discharge: HOME | End: 2024-12-24
Admitting: STUDENT IN AN ORGANIZED HEALTH CARE EDUCATION/TRAINING PROGRAM
Payer: MEDICARE

## 2024-12-24 DIAGNOSIS — F17.210 CIGARETTE NICOTINE DEPENDENCE WITHOUT COMPLICATION: ICD-10-CM

## 2024-12-24 PROCEDURE — 71271 CT THORAX LUNG CANCER SCR C-: CPT

## 2025-01-16 DIAGNOSIS — J42 CHRONIC BRONCHITIS, UNSPECIFIED CHRONIC BRONCHITIS TYPE: ICD-10-CM

## 2025-01-16 RX ORDER — FLUTICASONE FUROATE, UMECLIDINIUM BROMIDE AND VILANTEROL TRIFENATATE 200; 62.5; 25 UG/1; UG/1; UG/1
1 POWDER RESPIRATORY (INHALATION) DAILY
Qty: 60 EACH | Refills: 2 | Status: SHIPPED | OUTPATIENT
Start: 2025-01-16

## 2025-01-25 DIAGNOSIS — K21.00 GASTROESOPHAGEAL REFLUX DISEASE WITH ESOPHAGITIS, UNSPECIFIED WHETHER HEMORRHAGE: ICD-10-CM

## 2025-01-27 NOTE — TELEPHONE ENCOUNTER
Rx Refill Note  Requested Prescriptions     Pending Prescriptions Disp Refills    esomeprazole (nexIUM) 40 MG capsule [Pharmacy Med Name: ESOMEPRAZOLE MAG DR 40 MG CAP] 90 capsule      Sig: TAKE ONE CAPSULE BY MOUTH EVERY MORNING BEFORE BREAKFAST      Last office visit with prescribing clinician: 12/11/2024   Last telemedicine visit with prescribing clinician: Visit date not found   Next office visit with prescribing clinician: 3/11/2025

## 2025-01-29 RX ORDER — ESOMEPRAZOLE MAGNESIUM 40 MG/1
40 CAPSULE, DELAYED RELEASE ORAL
Qty: 90 CAPSULE | Refills: 0 | Status: SHIPPED | OUTPATIENT
Start: 2025-01-29

## 2025-04-24 RX ORDER — CILOSTAZOL 50 MG/1
50 TABLET ORAL 2 TIMES DAILY
Qty: 180 TABLET | Refills: 3 | Status: SHIPPED | OUTPATIENT
Start: 2025-04-24

## 2025-04-27 DIAGNOSIS — K21.00 GASTROESOPHAGEAL REFLUX DISEASE WITH ESOPHAGITIS, UNSPECIFIED WHETHER HEMORRHAGE: ICD-10-CM

## 2025-04-28 RX ORDER — ESOMEPRAZOLE MAGNESIUM 40 MG/1
40 CAPSULE, DELAYED RELEASE ORAL
Qty: 90 CAPSULE | Refills: 0 | Status: SHIPPED | OUTPATIENT
Start: 2025-04-28

## 2025-05-01 DIAGNOSIS — J42 CHRONIC BRONCHITIS, UNSPECIFIED CHRONIC BRONCHITIS TYPE: ICD-10-CM

## 2025-05-01 RX ORDER — FLUTICASONE FUROATE, UMECLIDINIUM BROMIDE AND VILANTEROL TRIFENATATE 200; 62.5; 25 UG/1; UG/1; UG/1
1 POWDER RESPIRATORY (INHALATION) DAILY
Qty: 60 EACH | Refills: 2 | Status: SHIPPED | OUTPATIENT
Start: 2025-05-01

## 2025-07-08 ENCOUNTER — OFFICE VISIT (OUTPATIENT)
Dept: FAMILY MEDICINE CLINIC | Facility: CLINIC | Age: 70
End: 2025-07-08
Payer: MEDICARE

## 2025-07-08 VITALS
HEART RATE: 86 BPM | BODY MASS INDEX: 25.2 KG/M2 | SYSTOLIC BLOOD PRESSURE: 104 MMHG | RESPIRATION RATE: 18 BRPM | OXYGEN SATURATION: 96 % | WEIGHT: 180 LBS | HEIGHT: 71 IN | DIASTOLIC BLOOD PRESSURE: 74 MMHG

## 2025-07-08 DIAGNOSIS — J42 CHRONIC BRONCHITIS, UNSPECIFIED CHRONIC BRONCHITIS TYPE: ICD-10-CM

## 2025-07-08 DIAGNOSIS — R73.03 PREDIABETES: ICD-10-CM

## 2025-07-08 DIAGNOSIS — E78.5 DYSLIPIDEMIA: ICD-10-CM

## 2025-07-08 DIAGNOSIS — I10 PRIMARY HYPERTENSION: Primary | ICD-10-CM

## 2025-07-08 PROCEDURE — 3078F DIAST BP <80 MM HG: CPT | Performed by: STUDENT IN AN ORGANIZED HEALTH CARE EDUCATION/TRAINING PROGRAM

## 2025-07-08 PROCEDURE — 1160F RVW MEDS BY RX/DR IN RCRD: CPT | Performed by: STUDENT IN AN ORGANIZED HEALTH CARE EDUCATION/TRAINING PROGRAM

## 2025-07-08 PROCEDURE — 1159F MED LIST DOCD IN RCRD: CPT | Performed by: STUDENT IN AN ORGANIZED HEALTH CARE EDUCATION/TRAINING PROGRAM

## 2025-07-08 PROCEDURE — 3074F SYST BP LT 130 MM HG: CPT | Performed by: STUDENT IN AN ORGANIZED HEALTH CARE EDUCATION/TRAINING PROGRAM

## 2025-07-08 PROCEDURE — 36415 COLL VENOUS BLD VENIPUNCTURE: CPT | Performed by: STUDENT IN AN ORGANIZED HEALTH CARE EDUCATION/TRAINING PROGRAM

## 2025-07-08 PROCEDURE — 99214 OFFICE O/P EST MOD 30 MIN: CPT | Performed by: STUDENT IN AN ORGANIZED HEALTH CARE EDUCATION/TRAINING PROGRAM

## 2025-07-08 RX ORDER — ALBUTEROL SULFATE 90 UG/1
2 INHALANT RESPIRATORY (INHALATION) EVERY 4 HOURS PRN
Qty: 18 G | Refills: 2 | Status: SHIPPED | OUTPATIENT
Start: 2025-07-08

## 2025-07-08 NOTE — PROGRESS NOTES
"    Tio Umana DO  [facility]  [address]  [place] [id number] [phone]    Subjective      Name Sammy Roblero MRN [id number]    1955 AGE/SEX 69 y.o. / male      Chief Complaint Chief Complaint   Patient presents with    Hypertension     Check up          Visit History for  2025    Sammy Roblero is a 69 y.o. male who presented today for Hypertension (Check up )       History of Present Illness  The patient presents for a follow-up of prediabetes.    He reports an improvement in his condition, attributing it to increased physical activity and a healthier diet. He has been abstaining from sweets and consuming more fruits and vegetables. He also mentions that he has been spending more time outdoors.         Medications and Allergies   Current Outpatient Medications   Medication Instructions    albuterol sulfate  (90 Base) MCG/ACT inhaler 2 puffs, Inhalation, Every 4 Hours PRN    aspirin 81 mg, Oral, Daily    atorvastatin (LIPITOR) 40 mg, Oral, Daily    cilostazol (PLETAL) 50 mg, Oral, 2 Times Daily    clopidogrel (PLAVIX) 75 mg, Oral, Daily    dilTIAZem CD (CARDIZEM CD) 300 mg, Oral, Daily    DULoxetine (CYMBALTA) 30 mg, 2 Times Daily    esomeprazole (NEXIUM) 40 mg, Oral    losartan (COZAAR) 50 mg, Oral, Nightly    pregabalin (LYRICA) 75 mg, Oral, 2 Times Daily    sildenafil (VIAGRA) 50 mg, Oral, Daily PRN    Trelegy Ellipta 200-62.5-25 MCG/ACT inhaler 1 puff, Inhalation, Daily     No Known Allergies   I have reviewed the above medications and allergies     Objective:      Vitals Vitals:    25 1403   BP: 104/74   BP Location: Left arm   Patient Position: Sitting   Cuff Size: Adult   Pulse: 86   Resp: 18   SpO2: 96%   Weight: 81.6 kg (180 lb)   Height: 180.3 cm (70.98\")     Body mass index is 25.12 kg/m².    Physical Exam  Vitals reviewed.   Constitutional:       General: He is not in acute distress.     Appearance: He is not ill-appearing. "   Cardiovascular:      Rate and Rhythm: Normal rate and regular rhythm.   Pulmonary:      Effort: Pulmonary effort is normal.      Breath sounds: Normal breath sounds.   Neurological:      Mental Status: He is alert.   Psychiatric:         Mood and Affect: Mood normal.         Behavior: Behavior normal.         Thought Content: Thought content normal.         Judgment: Judgment normal.          Physical Exam       Results       Assessment/Plan   Issues Addressed/ Plan   Diagnosis Plan   1. Primary hypertension        2. Chronic bronchitis, unspecified chronic bronchitis type  albuterol sulfate  (90 Base) MCG/ACT inhaler      3. Prediabetes  Hemoglobin A1c    Comprehensive Metabolic Panel      4. Dyslipidemia  Lipid Panel         Assessment & Plan  1. Prediabetes.  - Blood pressure readings are within the normal range, and some weight loss has been noted.  - Respiratory function is satisfactory upon examination.  - Blood work will be conducted today to monitor the impact of weight loss on prediabetes.  - Advised to maintain a balanced diet, limit bread intake, and engage in regular physical activity.    Follow-up  A follow-up visit is scheduled in 6 months for a physical examination.           There are no Patient Instructions on file for this visit.   Follow up  recommended Return in about 6 months (around 1/8/2026) for Medicare Wellness.   - Dragon voice recognition software was utilized to complete this chart.  Every reasonable attempt was made to edit and correct the text, however some incorrect words may remain.   Toi Umana DO    Patient or patient representative verbalized consent for the use of Ambient Listening during the visit with  Toi Umana DO for chart documentation. 7/15/2025  08:08 EDT

## 2025-07-08 NOTE — PROGRESS NOTES
Venipuncture Blood Specimen Collection  Venipuncture performed in left arm by Zakiya Meneses MA with good hemostasis. Patient tolerated the procedure well without complications.   07/08/25   Zakiya Meneses MA

## 2025-07-09 LAB
ALBUMIN SERPL-MCNC: 4.8 G/DL (ref 3.9–4.9)
ALP SERPL-CCNC: 173 IU/L (ref 44–121)
ALT SERPL-CCNC: 25 IU/L (ref 0–44)
AST SERPL-CCNC: 33 IU/L (ref 0–40)
BILIRUB SERPL-MCNC: 0.4 MG/DL (ref 0–1.2)
BUN SERPL-MCNC: 15 MG/DL (ref 8–27)
BUN/CREAT SERPL: 14 (ref 10–24)
CALCIUM SERPL-MCNC: 10 MG/DL (ref 8.6–10.2)
CHLORIDE SERPL-SCNC: 103 MMOL/L (ref 96–106)
CHOLEST SERPL-MCNC: 175 MG/DL (ref 100–199)
CO2 SERPL-SCNC: 19 MMOL/L (ref 20–29)
CREAT SERPL-MCNC: 1.04 MG/DL (ref 0.76–1.27)
EGFRCR SERPLBLD CKD-EPI 2021: 78 ML/MIN/1.73
GLOBULIN SER CALC-MCNC: 2.7 G/DL (ref 1.5–4.5)
GLUCOSE SERPL-MCNC: 131 MG/DL (ref 70–99)
HBA1C MFR BLD: 6.3 % (ref 4.8–5.6)
HDLC SERPL-MCNC: 51 MG/DL
LDLC SERPL CALC-MCNC: 73 MG/DL (ref 0–99)
POTASSIUM SERPL-SCNC: 3.5 MMOL/L (ref 3.5–5.2)
PROT SERPL-MCNC: 7.5 G/DL (ref 6–8.5)
SODIUM SERPL-SCNC: 143 MMOL/L (ref 134–144)
TRIGL SERPL-MCNC: 324 MG/DL (ref 0–149)
VLDLC SERPL CALC-MCNC: 51 MG/DL (ref 5–40)

## 2025-07-15 ENCOUNTER — RESULTS FOLLOW-UP (OUTPATIENT)
Dept: FAMILY MEDICINE CLINIC | Facility: CLINIC | Age: 70
End: 2025-07-15
Payer: MEDICARE

## 2025-07-24 DIAGNOSIS — N52.9 ERECTILE DYSFUNCTION, UNSPECIFIED ERECTILE DYSFUNCTION TYPE: ICD-10-CM

## 2025-07-25 RX ORDER — SILDENAFIL 50 MG/1
50 TABLET, FILM COATED ORAL DAILY PRN
Qty: 30 TABLET | Refills: 3 | Status: SHIPPED | OUTPATIENT
Start: 2025-07-25

## 2025-07-25 NOTE — TELEPHONE ENCOUNTER
Rx Refill Note  Requested Prescriptions     Pending Prescriptions Disp Refills    sildenafil (VIAGRA) 50 MG tablet [Pharmacy Med Name: SILDENAFIL 50 MG TABLET] 30 tablet 3     Sig: TAKE 1 TABLET BY MOUTH DAILY AS NEEDED FOR ERECTILE DYSFUNCTION      Last office visit with prescribing clinician: 7/8/2025   Last telemedicine visit with prescribing clinician: Visit date not found   Next office visit with prescribing clinician: 1/9/2026

## 2025-08-14 DIAGNOSIS — K21.00 GASTROESOPHAGEAL REFLUX DISEASE WITH ESOPHAGITIS, UNSPECIFIED WHETHER HEMORRHAGE: ICD-10-CM

## 2025-08-15 RX ORDER — ESOMEPRAZOLE MAGNESIUM 40 MG/1
40 CAPSULE, DELAYED RELEASE ORAL
Qty: 90 CAPSULE | Refills: 0 | Status: SHIPPED | OUTPATIENT
Start: 2025-08-15

## 2025-08-26 RX ORDER — LOSARTAN POTASSIUM 50 MG/1
50 TABLET ORAL NIGHTLY
Qty: 90 TABLET | Refills: 0 | Status: SHIPPED | OUTPATIENT
Start: 2025-08-26

## (undated) DEVICE — SOL IRR H2O BO 1000ML STRL

## (undated) DEVICE — 6F .070 JL3.5 100CM: Brand: CORDIS

## (undated) DEVICE — VIAL FORMALIN CAP 10P 40ML

## (undated) DEVICE — BW-412T DISP COMBO CLEANING BRUSH: Brand: SINGLE USE COMBINATION CLEANING BRUSH

## (undated) DEVICE — GUIDE CATHETER: Brand: MACH1™

## (undated) DEVICE — DEV INDEFLATOR

## (undated) DEVICE — CATH DIAG IMPULSE FR4 5F 100CM

## (undated) DEVICE — CATH IMG DRAGONFLY OPTIS 2.7F 135CM

## (undated) DEVICE — LINER SURG CANSTR SXN S/RIGD 1500CC

## (undated) DEVICE — KT ORCA ORCAPOD DISP STRL

## (undated) DEVICE — CATH DIAG IMPULSE FL3.5 5F 100CM

## (undated) DEVICE — GW EMR FIX EXCHG J STD .035 3MM 260CM

## (undated) DEVICE — KT MANIFLD CARDIAC

## (undated) DEVICE — Device

## (undated) DEVICE — 6F .070 JR 4 100CM: Brand: CORDIS

## (undated) DEVICE — GLIDESHEATH BASIC HYDROPHILIC COATED INTRODUCER SHEATH: Brand: GLIDESHEATH

## (undated) DEVICE — SYR LL W/SCALE/MARK 3ML STRL

## (undated) DEVICE — TR BAND RADIAL ARTERY COMPRESSION DEVICE: Brand: TR BAND

## (undated) DEVICE — ADAPT CLN BIOGUARD AIR/H2O DISP

## (undated) DEVICE — CATH VENT MIV RADL PIG ST TIP 4F 110CM

## (undated) DEVICE — THE SINGLE USE ETRAP – POLYP TRAP IS USED FOR SUCTION RETRIEVAL OF ENDOSCOPICALLY REMOVED POLYPS.: Brand: ETRAP

## (undated) DEVICE — THE BITE BLOCK MAXI, LATEX FREE STRAP IS USED TO PROTECT THE ENDOSCOPE INSERTION TUBE FROM BEING BITTEN BY THE PATIENT.

## (undated) DEVICE — GW HITORQUE/BAL MID/WT J W/HCOAT .014 3X190CM

## (undated) DEVICE — FRCP BX RADJAW4 NDL 2.8 240CM LG OG BX40

## (undated) DEVICE — PK CATH CARD 40

## (undated) DEVICE — SNAR POLYP CAPTIVATOR/COLD STFF RND 10MM 240CM